# Patient Record
Sex: MALE | Race: WHITE | NOT HISPANIC OR LATINO | Employment: FULL TIME | URBAN - METROPOLITAN AREA
[De-identification: names, ages, dates, MRNs, and addresses within clinical notes are randomized per-mention and may not be internally consistent; named-entity substitution may affect disease eponyms.]

---

## 2017-02-22 ENCOUNTER — HOSPITAL ENCOUNTER (INPATIENT)
Facility: HOSPITAL | Age: 37
LOS: 14 days | Discharge: NURSING, REHAB OR LONG-TERM CARE | DRG: 915 | End: 2017-03-08
Attending: EMERGENCY MEDICINE | Admitting: INTERNAL MEDICINE
Payer: COMMERCIAL

## 2017-02-22 DIAGNOSIS — R00.0 TACHYCARDIA: ICD-10-CM

## 2017-02-22 DIAGNOSIS — R73.9 HYPERGLYCEMIA, DRUG-INDUCED: ICD-10-CM

## 2017-02-22 DIAGNOSIS — J18.9 PNEUMONIA OF BOTH LOWER LOBES DUE TO INFECTIOUS ORGANISM: ICD-10-CM

## 2017-02-22 DIAGNOSIS — T50.905A HYPERGLYCEMIA, DRUG-INDUCED: ICD-10-CM

## 2017-02-22 DIAGNOSIS — L51.1 STEVENS-JOHNSON SYNDROME: ICD-10-CM

## 2017-02-22 DIAGNOSIS — J15.69 PNEUMONIA DUE TO GRAM-NEGATIVE BACTERIA: ICD-10-CM

## 2017-02-22 DIAGNOSIS — Z01.818 ENCOUNTER FOR INTUBATION: ICD-10-CM

## 2017-02-22 DIAGNOSIS — S90.829A BLISTER OF FOOT, UNSPECIFIED LATERALITY, INITIAL ENCOUNTER: ICD-10-CM

## 2017-02-22 DIAGNOSIS — T78.2XXA ANAPHYLAXIS: ICD-10-CM

## 2017-02-22 DIAGNOSIS — J96.01 ACUTE RESPIRATORY FAILURE WITH HYPOXIA: ICD-10-CM

## 2017-02-22 DIAGNOSIS — L02.411 ABSCESS OF AXILLA, RIGHT: ICD-10-CM

## 2017-02-22 DIAGNOSIS — T78.2XXA ANAPHYLAXIS, INITIAL ENCOUNTER: ICD-10-CM

## 2017-02-22 DIAGNOSIS — R29.898 RIGHT ARM WEAKNESS: ICD-10-CM

## 2017-02-22 DIAGNOSIS — R78.81 POSITIVE BLOOD CULTURE: ICD-10-CM

## 2017-02-22 DIAGNOSIS — T78.40XA ALLERGIC REACTION, INITIAL ENCOUNTER: Primary | ICD-10-CM

## 2017-02-22 LAB
ALBUMIN SERPL BCP-MCNC: 4 G/DL
ALLENS TEST: ABNORMAL
ALP SERPL-CCNC: 53 U/L
ALT SERPL W/O P-5'-P-CCNC: 53 U/L
ANION GAP SERPL CALC-SCNC: 11 MMOL/L
APTT BLDCRRT: 26.4 SEC
AST SERPL-CCNC: 21 U/L
BACTERIA #/AREA URNS AUTO: ABNORMAL /HPF
BASOPHILS # BLD AUTO: 0.02 K/UL
BASOPHILS NFR BLD: 0.2 %
BILIRUB SERPL-MCNC: 1.1 MG/DL
BILIRUB UR QL STRIP: NEGATIVE
BNP SERPL-MCNC: <10 PG/ML
BUN SERPL-MCNC: 13 MG/DL
CALCIUM SERPL-MCNC: 9.5 MG/DL
CHLORIDE SERPL-SCNC: 106 MMOL/L
CLARITY UR REFRACT.AUTO: CLEAR
CO2 SERPL-SCNC: 22 MMOL/L
COLOR UR AUTO: YELLOW
CREAT SERPL-MCNC: 1.2 MG/DL
DELSYS: ABNORMAL
DIFFERENTIAL METHOD: ABNORMAL
EOSINOPHIL # BLD AUTO: 0.2 K/UL
EOSINOPHIL NFR BLD: 2 %
ERYTHROCYTE [DISTWIDTH] IN BLOOD BY AUTOMATED COUNT: 13.6 %
ERYTHROCYTE [SEDIMENTATION RATE] IN BLOOD BY WESTERGREN METHOD: 14 MM/H
EST. GFR  (AFRICAN AMERICAN): >60 ML/MIN/1.73 M^2
EST. GFR  (NON AFRICAN AMERICAN): >60 ML/MIN/1.73 M^2
FIO2: 80
FLUAV AG SPEC QL IA: NEGATIVE
FLUBV AG SPEC QL IA: NEGATIVE
GLUCOSE SERPL-MCNC: 103 MG/DL
GLUCOSE UR QL STRIP: NEGATIVE
HCO3 UR-SCNC: 21.7 MMOL/L (ref 24–28)
HCT VFR BLD AUTO: 47.3 %
HGB BLD-MCNC: 15.8 G/DL
HGB UR QL STRIP: ABNORMAL
HYALINE CASTS UR QL AUTO: 5 /LPF
INR PPP: 1.1
INR PPP: 1.1
KETONES UR QL STRIP: NEGATIVE
LACTATE SERPL-SCNC: 0.7 MMOL/L
LACTATE SERPL-SCNC: 1.4 MMOL/L
LACTATE SERPL-SCNC: 1.7 MMOL/L
LEUKOCYTE ESTERASE UR QL STRIP: NEGATIVE
LYMPHOCYTES # BLD AUTO: 1.5 K/UL
LYMPHOCYTES NFR BLD: 14.6 %
MCH RBC QN AUTO: 30.5 PG
MCHC RBC AUTO-ENTMCNC: 33.4 %
MCV RBC AUTO: 91 FL
MICROSCOPIC COMMENT: ABNORMAL
MODE: ABNORMAL
MONOCYTES # BLD AUTO: 0.7 K/UL
MONOCYTES NFR BLD: 6.2 %
NEUTROPHILS # BLD AUTO: 8.1 K/UL
NEUTROPHILS NFR BLD: 76.7 %
NITRITE UR QL STRIP: NEGATIVE
PCO2 BLDA: 41.5 MMHG (ref 35–45)
PEEP: 5
PH SMN: 7.33 [PH] (ref 7.35–7.45)
PH UR STRIP: 6 [PH] (ref 5–8)
PLATELET # BLD AUTO: 223 K/UL
PMV BLD AUTO: 10 FL
PO2 BLDA: 115 MMHG (ref 80–100)
POC BE: -4 MMOL/L
POC SATURATED O2: 98 % (ref 95–100)
POCT GLUCOSE: 152 MG/DL (ref 70–110)
POTASSIUM SERPL-SCNC: 4.1 MMOL/L
PROT SERPL-MCNC: 7.8 G/DL
PROT UR QL STRIP: ABNORMAL
PROTHROMBIN TIME: 11.3 SEC
PROTHROMBIN TIME: 11.3 SEC
RBC # BLD AUTO: 5.18 M/UL
RBC #/AREA URNS AUTO: 2 /HPF (ref 0–4)
SAMPLE: ABNORMAL
SITE: ABNORMAL
SODIUM SERPL-SCNC: 139 MMOL/L
SP GR UR STRIP: >=1.03 (ref 1–1.03)
SPECIMEN SOURCE: NORMAL
SQUAMOUS #/AREA URNS AUTO: 1 /HPF
TROPONIN I SERPL DL<=0.01 NG/ML-MCNC: <0.006 NG/ML
TSH SERPL DL<=0.005 MIU/L-ACNC: 2.23 UIU/ML
URN SPEC COLLECT METH UR: ABNORMAL
UROBILINOGEN UR STRIP-ACNC: NEGATIVE EU/DL
VT: 500
WBC # BLD AUTO: 10.53 K/UL
WBC #/AREA URNS AUTO: 2 /HPF (ref 0–5)

## 2017-02-22 PROCEDURE — 85730 THROMBOPLASTIN TIME PARTIAL: CPT

## 2017-02-22 PROCEDURE — 63600175 PHARM REV CODE 636 W HCPCS: Performed by: INTERNAL MEDICINE

## 2017-02-22 PROCEDURE — 94002 VENT MGMT INPAT INIT DAY: CPT

## 2017-02-22 PROCEDURE — 96361 HYDRATE IV INFUSION ADD-ON: CPT | Mod: 59

## 2017-02-22 PROCEDURE — 51702 INSERT TEMP BLADDER CATH: CPT

## 2017-02-22 PROCEDURE — 25000003 PHARM REV CODE 250: Performed by: EMERGENCY MEDICINE

## 2017-02-22 PROCEDURE — 63600175 PHARM REV CODE 636 W HCPCS: Performed by: EMERGENCY MEDICINE

## 2017-02-22 PROCEDURE — 84484 ASSAY OF TROPONIN QUANT: CPT

## 2017-02-22 PROCEDURE — 31500 INSERT EMERGENCY AIRWAY: CPT

## 2017-02-22 PROCEDURE — 99900035 HC TECH TIME PER 15 MIN (STAT)

## 2017-02-22 PROCEDURE — 80053 COMPREHEN METABOLIC PANEL: CPT

## 2017-02-22 PROCEDURE — 36600 WITHDRAWAL OF ARTERIAL BLOOD: CPT

## 2017-02-22 PROCEDURE — 99291 CRITICAL CARE FIRST HOUR: CPT | Mod: ,,, | Performed by: INTERNAL MEDICINE

## 2017-02-22 PROCEDURE — 25000003 PHARM REV CODE 250: Performed by: INTERNAL MEDICINE

## 2017-02-22 PROCEDURE — 87040 BLOOD CULTURE FOR BACTERIA: CPT | Mod: 59

## 2017-02-22 PROCEDURE — 85025 COMPLETE CBC W/AUTO DIFF WBC: CPT

## 2017-02-22 PROCEDURE — 96366 THER/PROPH/DIAG IV INF ADDON: CPT | Mod: 59

## 2017-02-22 PROCEDURE — 83605 ASSAY OF LACTIC ACID: CPT | Mod: 91

## 2017-02-22 PROCEDURE — 96367 TX/PROPH/DG ADDL SEQ IV INF: CPT | Mod: 59

## 2017-02-22 PROCEDURE — 27200966 HC CLOSED SUCTION SYSTEM

## 2017-02-22 PROCEDURE — 96365 THER/PROPH/DIAG IV INF INIT: CPT | Mod: 59

## 2017-02-22 PROCEDURE — 87400 INFLUENZA A/B EACH AG IA: CPT | Mod: 59

## 2017-02-22 PROCEDURE — 81000 URINALYSIS NONAUTO W/SCOPE: CPT

## 2017-02-22 PROCEDURE — 27100108

## 2017-02-22 PROCEDURE — 85610 PROTHROMBIN TIME: CPT

## 2017-02-22 PROCEDURE — 83880 ASSAY OF NATRIURETIC PEPTIDE: CPT

## 2017-02-22 PROCEDURE — 96376 TX/PRO/DX INJ SAME DRUG ADON: CPT

## 2017-02-22 PROCEDURE — 99285 EMERGENCY DEPT VISIT HI MDM: CPT

## 2017-02-22 PROCEDURE — 96375 TX/PRO/DX INJ NEW DRUG ADDON: CPT

## 2017-02-22 PROCEDURE — 27100080 HC AIRWAY ADAPTER-END TIDAL CO2

## 2017-02-22 PROCEDURE — 25000003 PHARM REV CODE 250

## 2017-02-22 PROCEDURE — 96368 THER/DIAG CONCURRENT INF: CPT | Mod: 59

## 2017-02-22 PROCEDURE — 82962 GLUCOSE BLOOD TEST: CPT

## 2017-02-22 PROCEDURE — 11000001 HC ACUTE MED/SURG PRIVATE ROOM

## 2017-02-22 PROCEDURE — 85347 COAGULATION TIME ACTIVATED: CPT

## 2017-02-22 PROCEDURE — 84443 ASSAY THYROID STIM HORMONE: CPT

## 2017-02-22 RX ORDER — LORAZEPAM 2 MG/ML
2 INJECTION INTRAMUSCULAR
Status: COMPLETED | OUTPATIENT
Start: 2017-02-22 | End: 2017-02-22

## 2017-02-22 RX ORDER — DIPHENHYDRAMINE HYDROCHLORIDE 50 MG/ML
25 INJECTION INTRAMUSCULAR; INTRAVENOUS
Status: DISCONTINUED | OUTPATIENT
Start: 2017-02-22 | End: 2017-02-22

## 2017-02-22 RX ORDER — ENOXAPARIN SODIUM 100 MG/ML
40 INJECTION SUBCUTANEOUS EVERY 24 HOURS
Status: DISCONTINUED | OUTPATIENT
Start: 2017-02-23 | End: 2017-03-08 | Stop reason: HOSPADM

## 2017-02-22 RX ORDER — SUCCINYLCHOLINE CHLORIDE 20 MG/ML
200 INJECTION INTRAMUSCULAR; INTRAVENOUS
Status: COMPLETED | OUTPATIENT
Start: 2017-02-22 | End: 2017-02-22

## 2017-02-22 RX ORDER — FAMOTIDINE 20 MG/50ML
20 INJECTION, SOLUTION INTRAVENOUS 2 TIMES DAILY
Status: DISCONTINUED | OUTPATIENT
Start: 2017-02-22 | End: 2017-03-02

## 2017-02-22 RX ORDER — PROPOFOL 10 MG/ML
INJECTION, EMULSION INTRAVENOUS
Status: DISPENSED
Start: 2017-02-22 | End: 2017-02-22

## 2017-02-22 RX ORDER — PROPOFOL 10 MG/ML
15 INJECTION, EMULSION INTRAVENOUS CONTINUOUS
Status: DISCONTINUED | OUTPATIENT
Start: 2017-02-22 | End: 2017-02-22

## 2017-02-22 RX ORDER — PROPOFOL 10 MG/ML
INJECTION, EMULSION INTRAVENOUS
Status: DISPENSED
Start: 2017-02-22 | End: 2017-02-23

## 2017-02-22 RX ORDER — FAMOTIDINE 20 MG/50ML
20 INJECTION, SOLUTION INTRAVENOUS
Status: COMPLETED | OUTPATIENT
Start: 2017-02-22 | End: 2017-02-22

## 2017-02-22 RX ORDER — LORAZEPAM 2 MG/ML
INJECTION INTRAMUSCULAR
Status: DISPENSED
Start: 2017-02-22 | End: 2017-02-22

## 2017-02-22 RX ORDER — IPRATROPIUM BROMIDE AND ALBUTEROL SULFATE 2.5; .5 MG/3ML; MG/3ML
3 SOLUTION RESPIRATORY (INHALATION) EVERY 6 HOURS
Status: DISCONTINUED | OUTPATIENT
Start: 2017-02-23 | End: 2017-03-03

## 2017-02-22 RX ORDER — DIPHENHYDRAMINE HYDROCHLORIDE 50 MG/ML
50 INJECTION INTRAMUSCULAR; INTRAVENOUS
Status: COMPLETED | OUTPATIENT
Start: 2017-02-22 | End: 2017-02-22

## 2017-02-22 RX ORDER — FENTANYL CITRAT/DEXTROSE 5%/PF 100 MCG/10
PATIENT CONTROLLED ANALGESIA SYRINGE INTRAVENOUS
Status: COMPLETED | OUTPATIENT
Start: 2017-02-22 | End: 2017-02-22

## 2017-02-22 RX ORDER — PROPOFOL 10 MG/ML
15 INJECTION, EMULSION INTRAVENOUS
Status: COMPLETED | OUTPATIENT
Start: 2017-02-22 | End: 2017-02-22

## 2017-02-22 RX ORDER — PROPOFOL 10 MG/ML
15 INJECTION, EMULSION INTRAVENOUS CONTINUOUS
Status: DISCONTINUED | OUTPATIENT
Start: 2017-02-22 | End: 2017-02-23

## 2017-02-22 RX ORDER — SODIUM CHLORIDE 9 MG/ML
INJECTION, SOLUTION INTRAVENOUS CONTINUOUS
Status: DISCONTINUED | OUTPATIENT
Start: 2017-02-22 | End: 2017-02-23

## 2017-02-22 RX ORDER — ETOMIDATE 2 MG/ML
30 INJECTION INTRAVENOUS
Status: COMPLETED | OUTPATIENT
Start: 2017-02-22 | End: 2017-02-22

## 2017-02-22 RX ORDER — ONDANSETRON 2 MG/ML
4 INJECTION INTRAMUSCULAR; INTRAVENOUS EVERY 12 HOURS PRN
Status: DISCONTINUED | OUTPATIENT
Start: 2017-02-22 | End: 2017-03-08 | Stop reason: HOSPADM

## 2017-02-22 RX ADMIN — SUCCINYLCHOLINE CHLORIDE 170 MG: 20 INJECTION, SOLUTION INTRAMUSCULAR; INTRAVENOUS at 08:02

## 2017-02-22 RX ADMIN — PIPERACILLIN AND TAZOBACTAM 4.5 G: 4; .5 INJECTION, POWDER, LYOPHILIZED, FOR SOLUTION INTRAVENOUS; PARENTERAL at 10:02

## 2017-02-22 RX ADMIN — PROPOFOL 15 MCG/KG/MIN: 10 INJECTION, EMULSION INTRAVENOUS at 08:02

## 2017-02-22 RX ADMIN — PROPOFOL 40 MCG/KG/MIN: 10 INJECTION, EMULSION INTRAVENOUS at 02:02

## 2017-02-22 RX ADMIN — FAMOTIDINE 20 MG: 20 INJECTION, SOLUTION INTRAVENOUS at 11:02

## 2017-02-22 RX ADMIN — ETOMIDATE 30 MG: 2 INJECTION, SOLUTION INTRAVENOUS at 08:02

## 2017-02-22 RX ADMIN — FAMOTIDINE 20 MG: 20 INJECTION, SOLUTION INTRAVENOUS at 08:02

## 2017-02-22 RX ADMIN — LORAZEPAM 2 MG: 2 INJECTION, SOLUTION INTRAMUSCULAR; INTRAVENOUS at 08:02

## 2017-02-22 RX ADMIN — METHYLPREDNISOLONE SODIUM SUCCINATE 125 MG: 125 INJECTION, POWDER, FOR SOLUTION INTRAMUSCULAR; INTRAVENOUS at 08:02

## 2017-02-22 RX ADMIN — DIPHENHYDRAMINE HYDROCHLORIDE 50 MG: 50 INJECTION, SOLUTION INTRAMUSCULAR; INTRAVENOUS at 08:02

## 2017-02-22 RX ADMIN — LORAZEPAM 2 MG: 2 INJECTION, SOLUTION INTRAMUSCULAR; INTRAVENOUS at 10:02

## 2017-02-22 RX ADMIN — SODIUM CHLORIDE 1000 ML: 0.9 INJECTION, SOLUTION INTRAVENOUS at 08:02

## 2017-02-22 RX ADMIN — PROPOFOL 40 MCG/KG/MIN: 10 INJECTION, EMULSION INTRAVENOUS at 07:02

## 2017-02-22 RX ADMIN — PROPOFOL 40 MCG/KG/MIN: 10 INJECTION, EMULSION INTRAVENOUS at 10:02

## 2017-02-22 RX ADMIN — Medication 2500 MCG: at 10:02

## 2017-02-22 RX ADMIN — VANCOMYCIN HYDROCHLORIDE 2000 MG: 1 INJECTION, POWDER, LYOPHILIZED, FOR SOLUTION INTRAVENOUS at 01:02

## 2017-02-22 RX ADMIN — SODIUM CHLORIDE: 0.9 INJECTION, SOLUTION INTRAVENOUS at 03:02

## 2017-02-22 RX ADMIN — PROPOFOL 40 MCG/KG/MIN: 10 INJECTION, EMULSION INTRAVENOUS at 04:02

## 2017-02-22 NOTE — SUBJECTIVE & OBJECTIVE
Past Medical History   Diagnosis Date    Gout        History reviewed. No pertinent past surgical history.    Review of patient's allergies indicates:   Allergen Reactions    Bactrim [sulfamethoxazole-trimethoprim] Swelling    Rifamycin analogues        No current facility-administered medications on file prior to encounter.      Current Outpatient Prescriptions on File Prior to Encounter   Medication Sig    [DISCONTINUED] sulfamethoxazole-trimethoprim 800-160mg (BACTRIM DS) 800-160 mg Tab Take 2 tablets by mouth 2 (two) times daily.     Family History     Problem Relation (Age of Onset)    Heart disease Father    No Known Problems Mother        Social History Main Topics    Smoking status: Former Smoker     Types: Cigarettes     Quit date: 2/21/2013    Smokeless tobacco: Not on file    Alcohol use No    Drug use: No    Sexual activity: Not on file     Review of Systems   Unable to perform ROS: Intubated     Objective:     Vital Signs (Most Recent):  Temp: 98.7 °F (37.1 °C) (02/22/17 1325)  Pulse: 104 (02/22/17 1419)  Resp: (!) 30 (02/22/17 1419)  BP: 128/75 (02/22/17 1419)  SpO2: 95 % (02/22/17 1419) Vital Signs (24h Range):  Temp:  [98.7 °F (37.1 °C)-99 °F (37.2 °C)] 98.7 °F (37.1 °C)  Pulse:  [] 104  Resp:  [16-30] 30  SpO2:  [87 %-100 %] 95 %  BP: (110-172)/() 128/75     Weight: (!) 176.2 kg (388 lb 7.2 oz)  Body mass index is 51.25 kg/(m^2).    Physical Exam   Constitutional: He appears well-developed and well-nourished.   HENT:   Head: Normocephalic and atraumatic.   Nose: Nose normal.   No sloughing noted in mouth in the areas visible   Eyes: Conjunctivae and EOM are normal.   Cardiovascular: Normal rate, regular rhythm, normal heart sounds and intact distal pulses.    Pulmonary/Chest: Effort normal and breath sounds normal.   Patient intubated and sedate   Abdominal: Soft. Bowel sounds are normal.   Genitourinary: Penis normal.   Neurological:   sedated   Skin: Skin is warm and dry.    Multiple tatoos noted  Left leg birth ceci noted.        Significant Labs:   Blood Culture: No results for input(s): LABBLOO in the last 48 hours.  CBC:   Recent Labs  Lab 02/22/17  0811   WBC 10.53   HGB 15.8   HCT 47.3        CMP:   Recent Labs  Lab 02/22/17  0811      K 4.1      CO2 22*      BUN 13   CREATININE 1.2   CALCIUM 9.5   PROT 7.8   ALBUMIN 4.0   BILITOT 1.1*   ALKPHOS 53*   AST 21   ALT 53*   ANIONGAP 11   EGFRNONAA >60.0     Coagulation:   Recent Labs  Lab 02/22/17  0811 02/22/17  1025   INR 1.1 1.1   APTT 26.4  --      Lactic Acid:   Recent Labs  Lab 02/22/17  1025 02/22/17  1310   LACTATE 0.7 1.4     Troponin:   Recent Labs  Lab 02/22/17  1025   TROPONINI <0.006     Urine Studies:   Recent Labs  Lab 02/22/17  1025   COLORU Yellow   APPEARANCEUA Clear   PHUR 6.0   SPECGRAV >=1.030*   PROTEINUA 2+*   GLUCUA Negative   KETONESU Negative   BILIRUBINUA Negative   OCCULTUA Trace*   NITRITE Negative   UROBILINOGEN Negative   LEUKOCYTESUR Negative   RBCUA 2   WBCUA 2   BACTERIA Rare   SQUAMEPITHEL 1   HYALINECASTS 5*       Significant Imaging:   CXR:   1.  An endotracheal tube is in place.  The tip is located 38 mm superior to the estefany.   2.  The borders of the heart are not well seen.  There is a moderate amount of alveolar consolidation in the left perihilar region.  This may be secondary to atelectasis.  However, an infectious process cannot be excluded.  3.  There is opacification of the left costophrenic angle.  This is characteristic of a tiny left pleural effusion.

## 2017-02-22 NOTE — ED NOTES
46.7 ML Fentanyl infused. Remaining bag sent with [facility]. Fentanyl, Propofol, Zosyn infusing en route.

## 2017-02-22 NOTE — ASSESSMENT & PLAN NOTE
- recent use of bactrim  - currently intubated  - monitor vitals  - Leukocytosis likely reactive

## 2017-02-22 NOTE — ASSESSMENT & PLAN NOTE
- I&D done on 2/21/17 at [facility]  - DC bactrim due to reaction  - Cont IV vanc - monitor renal function  - Area clean and dry

## 2017-02-22 NOTE — PROGRESS NOTES
[facility] Hospital Medicine  Progress Note    Patient Name: Matty Nunn  MRN: [id number]  Patient Class: IP- Inpatient   Admission Date: 2/22/2017  Length of Stay: 0 days  Attending Physician: Madi Thurman MD  Primary Care Provider: Ruddy Noble MD        Subjective:     Principal Problem:Allergic reaction    HPI:  Patient is a 37 yo male with a hx of gout presented to [facility] on 2/21/17 with a right axillary abscess which was I&D'd in the ER.  Patient was sent home on Bactrim.  He return to the ER today for increase difficulty breathing.  He was intubated in the ER, but self extubated himself.  He was then re intubated and sent to [facility] for further ICU care.      All history taken from records as pt has been intubated and sedated since prior to arrival.  Case d/w Dr. Mckinney ([facility] ER attending) who states all hx was taken from the pt prior to intubation.  Patient has a notable breath ceci on his left leg.      Hospital Course:       Past Medical History   Diagnosis Date    Gout        History reviewed. No pertinent past surgical history.    Review of patient's allergies indicates:   Allergen Reactions    Bactrim [sulfamethoxazole-trimethoprim] Swelling    Rifamycin analogues        No current facility-administered medications on file prior to encounter.      Current Outpatient Prescriptions on File Prior to Encounter   Medication Sig    [DISCONTINUED] sulfamethoxazole-trimethoprim 800-160mg (BACTRIM DS) 800-160 mg Tab Take 2 tablets by mouth 2 (two) times daily.     Family History     Problem Relation (Age of Onset)    Heart disease Father    No Known Problems Mother        Social History Main Topics    Smoking status: Former Smoker     Types: Cigarettes     Quit date: 2/21/2013    Smokeless tobacco: Not on file    Alcohol use No    Drug use: No    Sexual activity: Not on file     Review of Systems   Unable to perform ROS: Intubated     Objective:     Vital  Signs (Most Recent):  Temp: 98.7 °F (37.1 °C) (02/22/17 1325)  Pulse: 104 (02/22/17 1419)  Resp: (!) 30 (02/22/17 1419)  BP: 128/75 (02/22/17 1419)  SpO2: 95 % (02/22/17 1419) Vital Signs (24h Range):  Temp:  [98.7 °F (37.1 °C)-99 °F (37.2 °C)] 98.7 °F (37.1 °C)  Pulse:  [] 104  Resp:  [16-30] 30  SpO2:  [87 %-100 %] 95 %  BP: (110-172)/() 128/75     Weight: (!) 176.2 kg (388 lb 7.2 oz)  Body mass index is 51.25 kg/(m^2).    Physical Exam   Constitutional: He appears well-developed and well-nourished.   HENT:   Head: Normocephalic and atraumatic.   Nose: Nose normal.   No sloughing noted in mouth in the areas visible   Eyes: Conjunctivae and EOM are normal.   Cardiovascular: Normal rate, regular rhythm, normal heart sounds and intact distal pulses.    Pulmonary/Chest: Effort normal and breath sounds normal.   Patient intubated and sedate   Abdominal: Soft. Bowel sounds are normal.   Genitourinary: Penis normal.   Neurological:   sedated   Skin: Skin is warm and dry.   Multiple tatoos noted  Left leg birth ceci noted.        Significant Labs:   Blood Culture: No results for input(s): LABBLOO in the last 48 hours.  CBC:   Recent Labs  Lab 02/22/17  0811   WBC 10.53   HGB 15.8   HCT 47.3        CMP:   Recent Labs  Lab 02/22/17  0811      K 4.1      CO2 22*      BUN 13   CREATININE 1.2   CALCIUM 9.5   PROT 7.8   ALBUMIN 4.0   BILITOT 1.1*   ALKPHOS 53*   AST 21   ALT 53*   ANIONGAP 11   EGFRNONAA >60.0     Coagulation:   Recent Labs  Lab 02/22/17  0811 02/22/17  1025   INR 1.1 1.1   APTT 26.4  --      Lactic Acid:   Recent Labs  Lab 02/22/17  1025 02/22/17  1310   LACTATE 0.7 1.4     Troponin:   Recent Labs  Lab 02/22/17  1025   TROPONINI <0.006     Urine Studies:   Recent Labs  Lab 02/22/17  1025   COLORU Yellow   APPEARANCEUA Clear   PHUR 6.0   SPECGRAV >=1.030*   PROTEINUA 2+*   GLUCUA Negative   KETONESU Negative   BILIRUBINUA Negative   OCCULTUA Trace*   NITRITE Negative    UROBILINOGEN Negative   LEUKOCYTESUR Negative   RBCUA 2   WBCUA 2   BACTERIA Rare   SQUAMEPITHEL 1   HYALINECASTS 5*       Significant Imaging:   CXR:   1.  An endotracheal tube is in place.  The tip is located 38 mm superior to the estefany.   2.  The borders of the heart are not well seen.  There is a moderate amount of alveolar consolidation in the left perihilar region.  This may be secondary to atelectasis.  However, an infectious process cannot be excluded.  3.  There is opacification of the left costophrenic angle.  This is characteristic of a tiny left pleural effusion.    Assessment/Plan:      * Allergic reaction  - recent use of bactrim  - currently intubated  - monitor vitals        Abscess of axilla, right  - I&D done on 2/21/17 at [facility]  - DC bactrim due to reaction  - Cont IV vanc - monitor renal function      VTE Risk Mitigation     None          Martin Grajeda MD  Department of Hospital Medicine   [facility]

## 2017-02-22 NOTE — IP AVS SNAPSHOT
[facility]  [id number] [place] [address] [address] [place] [address]           Patient Discharge Instructions     Our goal is to set you up for success. This packet includes information on your condition, medications, and your home care. It will help you to care for yourself so you don't get sicker and need to go back to the hospital.     Please ask your nurse if you have any questions.        There are many details to remember when preparing to leave the hospital. Here is what you will need to do:    1. Take your medicine. If you are prescribed medications, review your Medication List in the following pages. You may have new medications to  at the pharmacy and others that you'll need to stop taking. Review the instructions for how and when to take your medications. Talk with your doctor or nurses if you are unsure of what to do.     2. Go to your follow-up appointments. Specific follow-up information is listed in the following pages. Your may be contacted by a transition nurse or clinical provider about future appointments. Be sure we have all of the phone numbers to reach you, if needed. Please contact your provider's office if you are unable to make an appointment.     3. Watch for warning signs. Your doctor or nurse will give you detailed warning signs to watch for and when to call for assistance. These instructions may also include educational information about your condition. If you experience any of warning signs to your health, call your doctor.               ** Verify the list of medication(s) below is accurate and up to date. Carry this with you in case of emergency. If your medications have changed, please notify your healthcare provider.             Medication List      ASK your doctor about these medications        Additional Info                      sulfamethoxazole-trimethoprim 800-160mg 800-160 mg Tab   Commonly known as:  BACTRIM DS   Quantity:  28 tablet   Refills:  0   Dose:  2  tablet    Instructions:  Take 2 tablets by mouth 2 (two) times daily.     Begin Date    AM    Noon    PM    Bedtime                  Please bring to all follow up appointments:    1. A copy of your discharge instructions.  2. All medicines you are currently taking in their original bottles.  3. Identification and insurance card.    Please arrive 15 minutes ahead of scheduled appointment time.    Please call 24 hours in advance if you must reschedule your appointment and/or time.        Follow-up Information     Follow up with Ruddy Noble MD In 3 weeks.    Specialty:  Family Medicine    Why:  Hospital follow up.    Contact information:    [address] [address]  [place] [address]  [phone]          Follow up with [facility].    Why:  Rehab    Contact information:    [phone]      Referrals     Future Orders    Referral to OutPatient  Physical therapy     Questions:    Post Surgical?:  No    Eval and Treat:  Yes    Duration:  60 days    Frequency (times per week):  Three    Precautions:      Location:      OT Location:      Restore Functional ADL Training?:      Gait Training:  Yes    Therapeutic Exercises:      Wound Care:      Traction:      Electric Stimulation:      IONTO.:      U/S:      Developmental Stimulation?:      Specialty Programs:      Referral to Outpatient Occupational therapy     Questions:    Post Surgical?:  No    OT Location:      Restore Functional ADL Training?:      Therapeutic Exercises:      Electric Stimulation:      IONTO.:      U/S:      Developmental Stimulation?:      Splinting (Specific Area):      Specialty Program:          Discharge Instructions     Future Orders    Diet general     Questions:    Total calories:      Fat restriction, if any:      Protein restriction, if any:      Na restriction, if any:      Fluid restriction:      Additional restrictions:      WALKER FOR HOME USE     Questions:    Type of Walker:  Heavy  "duty Comment - Bariatric     With wheels?:  Yes    Height:  6' 1" (1.854 m)    Weight:  152.9 kg (337 lb 1.3 oz)    Length of need (1-99 months):  12    Platform attachment:      Accessories/Other:      Assistance needed:      Does patient have medical equipment at home?:  none    Please check all that apply:  Patient's condition impairs ambulation.    Patient is unable to safely ambulate without equipment.    Patient needs help to get in and out of chair.    Walker will be used for gait training.        Primary Diagnosis     Your primary diagnosis was:  Severe Allergic Reaction      Admission Information     Date & Time Provider Department CSN    2/22/2017  8:04 AM Victorino Yu MD [facility] [id number]      Care Providers     Provider Role Specialty Primary office phone    Victorino Yu MD Attending Provider Internal Medicine [phone]    Gail Paz DPM Consulting Physician  Podiatry [phone]    Victorino Yu MD Team Attending  Internal Medicine [phone]      Your Vitals Were     BP Pulse Temp Resp Height Weight    121/59 92 98.5 °F (36.9 °C) (Oral) 18 6' 1" (1.854 m) 156 kg (343 lb 14.7 oz)    SpO2 BMI             94% 45.37 kg/m2         Recent Lab Values     No lab values to display.      Pending Labs     Order Current Status    Aerobic culture In process      Allergies as of 3/8/2017        Reactions    Bactrim [Sulfamethoxazole-trimethoprim] Swelling    Rifamycin Analogues       [facility]     [facility] Nurse Care Line - 24/7 Assistance  Unless otherwise directed by your provider, please contact [facility], our nurse care line that is available for 24/7 assistance.     Registered nurses in the [facility] provide clinical advisement, health education, appointment booking, and other advisory services.  Call for this free service at [phone].        Advance Directives     An advance directive is a document which, in the event " you are no longer able to make decisions for yourself, tells your healthcare team what kind of treatment you do or do not want to receive, or who you would like to make those decisions for you.  If you do not currently have an advance directive, [facility] encourages you to create one.  For more information call:  [phone]), [phone] ([phone]),  or log on to [email or web address].        Smoking Cessation     If you would like to quit smoking:   You may be eligible for free services if you are a [place] resident and started smoking cigarettes before September 1, 1988.  Call the Smoking Cessation Trust (SCT) toll free at (130) 265-1195 or (706) 090-3339.   Call 2-327-QUIT-NOW if you do not meet the above criteria.            Language Assistance Services     ATTENTION: Language assistance services are available, free of charge. Please call [phone].      ATENCIÓN: Si habla español, tiene a burgos disposición servicios gratuitos de asistencia lingüística. Llame al [phone].     [facility] Ý: N?u b?n nói Ti?ng Vi?t, có các d?ch v? h? tr? ngôn ng? mi?n phí dành cho b?n. G?i s? [phone].        Pneumonmia Discharge Instructions                [facility] Sign-Up     Activating your [facility] account is as easy as 1-2-3!     1) Visit [email or web address], select Sign Up Now, enter this activation code and your date of birth, then select Next.  [email or web address]  Expires: 4/7/2017  6:10 AM      2) Create a username and password to use when you visit [facility] in the future and select a security question in case you lose your password and select Next.    3) Enter your e-mail address and click Sign Up!    Additional Information  If you have questions, please e-mail [email or web address] or call [phone] to talk to our [facility] staff. Remember, [facility] is NOT to be used for urgent needs. For medical emergencies, dial 911.          [facility]  complies with applicable Federal  civil rights laws and does not discriminate on the basis of race, color, national origin, age, disability, or sex.

## 2017-02-22 NOTE — ED NOTES
Pt combative, pulling at ET tube. RAC IV pulled out with activity. RN x2, RT, Xray tech x2 at bedside.

## 2017-02-22 NOTE — ED NOTES
Pt's wife at bedside, pt stimulated, pt moving extremities, biting tube, increased respirations, breathing above vent.  Pt and pt's wife reassured.  Pt's eyes remained closed, increased sedation for comfort.

## 2017-02-22 NOTE — ED PROVIDER NOTES
Encounter Date: 2/22/2017       History     Chief Complaint   Patient presents with    Allergic Reaction     throat swelling, started bactrim yesterday, hx of allergy reports didn't know name of med yesterday when precribed     Review of patient's allergies indicates:   Allergen Reactions    Bactrim [sulfamethoxazole-trimethoprim] Swelling    Rifamycin analogues      Patient is a 36 y.o. male presenting with the following complaint: allergic reaction. The history is provided by the patient.   Allergic Reaction   The primary symptoms are  shortness of breath and cough. The primary symptoms do not include wheezing, abdominal pain, nausea, vomiting, diarrhea, dizziness, palpitations, altered mental status, rash or urticaria. The current episode started yesterday. The problem has been gradually worsening.   The shortness of breath began yesterday. The patient's medical history does not include CHF, COPD, asthma or chronic lung disease.       Past Medical History   Diagnosis Date    Gout      Past Medical History Pertinent Negatives   Diagnosis Date Noted    Immune deficiency disorder 2/22/2017     History reviewed. No pertinent past surgical history.  Family History   Problem Relation Age of Onset    No Known Problems Mother     Heart disease Father      Social History   Substance Use Topics    Smoking status: Former Smoker     Types: Cigarettes     Quit date: 2/21/2013    Smokeless tobacco: None    Alcohol use No     Review of Systems   Constitutional: Negative for chills and fever.   HENT: Positive for sore throat, trouble swallowing and voice change.    Respiratory: Positive for cough and shortness of breath. Negative for wheezing and stridor.    Cardiovascular: Negative for chest pain, palpitations and leg swelling.   Gastrointestinal: Negative for abdominal pain, diarrhea, nausea and vomiting.   Genitourinary: Negative for dysuria.   Musculoskeletal: Negative for back pain.   Skin: Positive for wound.  Negative for rash.   Neurological: Negative for dizziness and weakness.   Hematological: Does not bruise/bleed easily.   All other systems reviewed and are negative.      Physical Exam   Initial Vitals   BP Pulse Resp Temp SpO2   02/22/17 0807 02/22/17 0807 02/22/17 0807 02/22/17 0807 02/22/17 0807   135/78 117 18 99 °F (37.2 °C) 91 %     Physical Exam    Nursing note and vitals reviewed.  Constitutional: He appears well-developed and well-nourished. He appears distressed.   Walking speakin c muffeled voice.  Calm but uncomfortable   HENT:   Head: Normocephalic and atraumatic.   Mouth/Throat: Uvula is midline. Oral lesions present. No uvula swelling. Oropharyngeal exudate, posterior oropharyngeal edema and posterior oropharyngeal erythema present. No tonsillar abscesses.   Exudate and skin sloughing soft palet and post OP.  No stridor.  + gag and difficulty/pain c swollowing   Eyes: EOM are normal. Pupils are equal, round, and reactive to light.   Neck: Normal range of motion. Neck supple.   Cardiovascular: Normal rate, regular rhythm, normal heart sounds and intact distal pulses.   Pulmonary/Chest: No respiratory distress. He has no wheezes. He has no rhonchi.   Slight decreased BS on L- no wheezing   Abdominal: Soft. Bowel sounds are normal. There is no rebound.   Musculoskeletal: Normal range of motion. He exhibits no edema.   Neurological: He is alert and oriented to person, place, and time. He has normal strength. No cranial nerve deficit or sensory deficit.   Skin: Skin is warm and dry. No rash noted.   Birthmark on LLE   Psychiatric: He has a normal mood and affect. His behavior is normal. Judgment and thought content normal.         ED Course   Intubation  Date/Time: 2/22/2017 8:50 AM  Performed by: DARRIN KUHN  Authorized by: DARRIN KUHN.   Consent Done: Not Needed  Indications: respiratory distress and  airway protection  Intubation method: direct  Patient status: paralyzed (RSI)  Preoxygenation: mask and  nonrebreather mask  Sedatives: etomidate  Paralytic: succinylcholine  Laryngoscope size: Mac 4  Tube size: 8.0 mm  Tube type: cuffed  Number of attempts: 1  Cricoid pressure: yes  Cords visualized: yes  Post-procedure assessment: chest rise and CO2 detector  Breath sounds: clear  Cuff inflated: yes  ETT to lip: 24 cm  Tube secured with: umbilical tape  Patient tolerance: Patient tolerated the procedure well with no immediate complications    Intubation  Date/Time: 2/22/2017 9:00 AM  Performed by: DARRIN KUHN  Authorized by: DARRIN KUHN   Consent Done: Not Needed  Indications: airway protection  Intubation method: direct  Patient status: paralyzed (RSI)  Pretreatment meds: Propofol.  Sedatives: propofol  Paralytic: succinylcholine  Laryngoscope size: Mac 4  Tube size: 8.0 mm  Tube type: cuffed  Number of attempts: 1  Cricoid pressure: yes  Cords visualized: yes  Post-procedure assessment: chest rise and CO2 detector  Breath sounds: clear  Cuff inflated: yes  ETT to lip: 24 cm  Tube secured with: umbilical tape  Chest x-ray interpreted by me.  Chest x-ray findings: endotracheal tube too high  Tube repositioned: tube repositioned successfully  Patient tolerance: Patient tolerated the procedure well with no immediate complications        Labs Reviewed   CBC W/ AUTO DIFFERENTIAL   COMPREHENSIVE METABOLIC PANEL   APTT   PROTIME-INR   URINALYSIS   TSH         Results for orders placed or performed during the hospital encounter of 02/22/17   CBC auto differential   Result Value Ref Range    WBC 10.53 3.90 - 12.70 K/uL    RBC 5.18 4.60 - 6.20 M/uL    Hemoglobin 15.8 14.0 - 18.0 g/dL    Hematocrit 47.3 40.0 - 54.0 %    MCV 91 82 - 98 fL    MCH 30.5 27.0 - 31.0 pg    MCHC 33.4 32.0 - 36.0 %    RDW 13.6 11.5 - 14.5 %    Platelets 223 150 - 350 K/uL    MPV 10.0 9.2 - 12.9 fL    Gran # 8.1 (H) 1.8 - 7.7 K/uL    Lymph # 1.5 1.0 - 4.8 K/uL    Mono # 0.7 0.3 - 1.0 K/uL    Eos # 0.2 0.0 - 0.5 K/uL    Baso # 0.02 0.00 - 0.20 K/uL     Gran% 76.7 (H) 38.0 - 73.0 %    Lymph% 14.6 (L) 18.0 - 48.0 %    Mono% 6.2 4.0 - 15.0 %    Eosinophil% 2.0 0.0 - 8.0 %    Basophil% 0.2 0.0 - 1.9 %    Differential Method Automated    Comprehensive metabolic panel   Result Value Ref Range    Sodium 139 136 - 145 mmol/L    Potassium 4.1 3.5 - 5.1 mmol/L    Chloride 106 95 - 110 mmol/L    CO2 22 (L) 23 - 29 mmol/L    Glucose 103 70 - 110 mg/dL    BUN, Bld 13 6 - 20 mg/dL    Creatinine 1.2 0.5 - 1.4 mg/dL    Calcium 9.5 8.7 - 10.5 mg/dL    Total Protein 7.8 6.0 - 8.4 g/dL    Albumin 4.0 3.5 - 5.2 g/dL    Total Bilirubin 1.1 (H) 0.1 - 1.0 mg/dL    Alkaline Phosphatase 53 (L) 55 - 135 U/L    AST 21 10 - 40 U/L    ALT 53 (H) 10 - 44 U/L    Anion Gap 11 8 - 16 mmol/L    eGFR if African American >60.0 >60 mL/min/1.73 m^2    eGFR if non African American >60.0 >60 mL/min/1.73 m^2   APTT   Result Value Ref Range    aPTT 26.4 21.0 - 32.0 sec   Protime-INR   Result Value Ref Range    Prothrombin Time 11.3 9.0 - 12.5 sec    INR 1.1 0.8 - 1.2   TSH   Result Value Ref Range    TSH 2.225 0.400 - 4.000 uIU/mL   Protime-INR   Result Value Ref Range    Prothrombin Time 11.3 9.0 - 12.5 sec    INR 1.1 0.8 - 1.2               Imaging Results         X-Ray Chest 1 View (Final result) Result time:  02/22/17 09:57:42    Final result by PATRICIA Mcnulty Sr., MD (02/22/17 09:57:42)    Impression:        1.  An endotracheal tube is in place.  The tip is located 38 mm superior to the estefany.   2.  The borders of the heart are not well seen.  There is a moderate amount of alveolar consolidation in the left perihilar region.  This may be secondary to atelectasis.  However, an infectious process cannot be excluded.  3.  There is opacification of the left costophrenic angle.  This is characteristic of a tiny left pleural effusion.      Electronically signed by: PATRICIA MCNULTY MD  Date:     02/22/17  Time:    09:57     Narrative:    One-view chest x-ray    Clinical History: Encounter for other  preprocedural examination    Finding: An endotracheal tube is in place.  The tip is located 38 mm superior to the estefany.  The borders of the heart are not well seen.  There is a moderate amount of alveolar consolidation in the left perihilar region.  There is opacification of the left costophrenic angle.  The right costophrenic angle is sharp.  There is no pneumothorax.                            ED Course    [place] discussion with the patient.  He is maintaining his airway okay at this time.  He does have a gag reflex but has a muffled voice and difficulty swallowing.  On exam here he has some skin sloughing off his soft palate and his posterior oropharynx.  Discussed the differential diagnosis including Johnson-Hesham syndrome and the possibility that his condition could worsen.  He understands that he will need to be admitted to the hospital and is agreeable to being admitted to [facility] in [place].  However due to him having worsening symptoms and having some difficulty swallowing this time I recommend that I sedate him and and intubate him to protect his airway.  He understands this and the need to protect his airway in case things get worse.  We'll move him to a larger room to proceed with intubation and transfer him for admission to the hospital once his airway is secured.    9:08 AM patient extubated himself and tore out his IV.  We're working on IV access at this moment and we'll reintubate him as soon as possible.    10:32 AM Patient resting comfortably on the end but still moving intermittently.  We will add a fentanyl drip for his comfort - he is in soft restraints my order.  I admitted to the hospital for continued evaluation and treatment of his ALLERGIC reaction and in the intensive care unit because he is intubated and we cannot provide that level of care at this facility.  I discussed the case with Tess and [facility] medicine and she agrees to the current management.  She  requests I write admission orders to Dr. Thurman to the intensive care unit.  I do not see any other skin involvement and did not anticipate he will be need the burn unit.  It only seems to be involving his mucous membranes is posterior oropharynx.  The patient will need to be transported by [facility] ambulance service because he is intubated and receiving sedation IV.  There is no ICU bed available this time so Dr. Escobedo has accepted transfer the patient to the emergency department and Tess with [facility] medicine states they will evaluate him there.      2:07 PM I spoke with Dr. Grajeda who is taking care of the patient and she inquired about a rash since on the patient's lower extremity that looks like purpura and is blanchable.  I failed to document that in my initial chart that I asked the patient about that rash and he assured me he has had that since birth-it is a birthmark but I failed to document that on my initial physical exam note.     Clinical Impression:   The primary encounter diagnosis was Allergic reaction, initial encounter. 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    Disposition:   Disposition: Transferred  Condition: Stable       Everton Mckinney MD  02/23/17 0821

## 2017-02-22 NOTE — PROGRESS NOTES
Patient  placed on NRB. Intubation airways setup at bedside to protect airways from adverse allergic reactions. Ambu bag connected with 100% O2. Suction and MV set up and ready.

## 2017-02-22 NOTE — PROGRESS NOTES
Spoke with Dr. Mckinney in regards to patient. Pt will be transferred from [facility] ED to [facility] ED.  will not be accepting at this time. Once patient arrives to [facility] ED, pt will be re evaluated, if there is a high suspicion for Johnson Hesham, pt will need to be transferred to an appropriate facility.

## 2017-02-22 NOTE — PROGRESS NOTES
[facility] and connected to transport vent at this time.

## 2017-02-22 NOTE — H&P
[facility] Hospital Medicine  History and Physical     Patient Name: Matty Nunn  MRN: [id number]  Patient Class: IP- Inpatient   Admission Date: 2/22/2017  Length of Stay: 0 days  Attending Physician: Martin Grajeda MD  Primary Care Provider: Ruddy Noble MD           Subjective:      Principal Problem:Allergic reaction     HPI:  Patient is a 37 yo male with a hx of gout presented to [facility] on 2/21/17 with a right axillary abscess which was I&D'd in the ER. Patient was sent home on Bactrim. He return to the ER today for increase difficulty breathing. He was intubated in the ER, but self extubated himself. He was then re intubated and sent to [facility] for further ICU care.      All history taken from records as pt has been intubated and sedated since prior to arrival. Case d/w Dr. Mckinney ([facility] ER attending) who states all hx was taken from the pt prior to intubation. Patient has a notable breath ceci on his left leg.        Hospital Course:             Past Medical History   Diagnosis Date    Gout           History reviewed. No pertinent past surgical history.          Review of patient's allergies indicates:   Allergen Reactions    Bactrim [sulfamethoxazole-trimethoprim] Swelling    Rifamycin analogues           No current facility-administered medications on file prior to encounter.            Current Outpatient Prescriptions on File Prior to Encounter   Medication Sig    [DISCONTINUED] sulfamethoxazole-trimethoprim 800-160mg (BACTRIM DS) 800-160 mg Tab Take 2 tablets by mouth 2 (two) times daily.      Family History     Problem Relation (Age of Onset)     Heart disease Father     No Known Problems Mother               Social History Main Topics    Smoking status: Former Smoker       Types: Cigarettes       Quit date: 2/21/2013    Smokeless tobacco: Not on file    Alcohol use No    Drug use: No    Sexual activity: Not on file      Review of Systems   Unable to perform  ROS: Intubated      Objective:      Vital Signs (Most Recent):  Temp: 98.7 °F (37.1 °C) (02/22/17 1325)  Pulse: 104 (02/22/17 1419)  Resp: (!) 30 (02/22/17 1419)  BP: 128/75 (02/22/17 1419)  SpO2: 95 % (02/22/17 1419) Vital Signs (24h Range):  Temp: [98.7 °F (37.1 °C)-99 °F (37.2 °C)] 98.7 °F (37.1 °C)  Pulse: [] 104  Resp: [16-30] 30  SpO2: [87 %-100 %] 95 %  BP: (110-172)/() 128/75      Weight: (!) 176.2 kg (388 lb 7.2 oz)  Body mass index is 51.25 kg/(m^2).     Physical Exam   Constitutional: He appears well-developed and well-nourished.   HENT:   Head: Normocephalic and atraumatic.   Nose: Nose normal.   No sloughing noted in mouth in the areas visible   Eyes: Conjunctivae and EOM are normal.   Cardiovascular: Normal rate, regular rhythm, normal heart sounds and intact distal pulses.   Pulmonary/Chest: Effort normal and breath sounds normal.   Patient intubated and sedate   Abdominal: Soft. Bowel sounds are normal.   Genitourinary: Penis normal.   Neurological:   sedated   Skin: Skin is warm and dry.   Multiple tatoos noted  Left leg birth ceci noted.          Significant Labs:   Blood Culture: No results for input(s): LABBLOO in the last 48 hours.  CBC:   Recent Labs  Lab 02/22/17  0811   WBC 10.53   HGB 15.8   HCT 47.3         CMP:   Recent Labs  Lab 02/22/17  0811      K 4.1      CO2 22*      BUN 13   CREATININE 1.2   CALCIUM 9.5   PROT 7.8   ALBUMIN 4.0   BILITOT 1.1*   ALKPHOS 53*   AST 21   ALT 53*   ANIONGAP 11   EGFRNONAA >60.0      Coagulation:   Recent Labs  Lab 02/22/17  0811 02/22/17  1025   INR 1.1 1.1   APTT 26.4 --       Lactic Acid:   Recent Labs  Lab 02/22/17  1025 02/22/17  1310   LACTATE 0.7 1.4      Troponin:   Recent Labs  Lab 02/22/17  1025   TROPONINI <0.006      Urine Studies:   Recent Labs  Lab 02/22/17  1025   COLORU Yellow   APPEARANCEUA Clear   PHUR 6.0   SPECGRAV >=1.030*   PROTEINUA 2+*   GLUCUA Negative   KETONESU Negative   BILIRUBINUA  Negative   OCCULTUA Trace*   NITRITE Negative   UROBILINOGEN Negative   LEUKOCYTESUR Negative   RBCUA 2   WBCUA 2   BACTERIA Rare   SQUAMEPITHEL 1   HYALINECASTS 5*         Significant Imaging:   CXR:  1.  An endotracheal tube is in place.  The tip is located 38 mm superior to the estefany.   2.  The borders of the heart are not well seen.  There is a moderate amount of alveolar consolidation in the left perihilar region.  This may be secondary to atelectasis.  However, an infectious process cannot be excluded.  3.  There is opacification of the left costophrenic angle.  This is characteristic of a tiny left pleural effusion.     Assessment/Plan:       * Allergic reaction/ Anaphylaxis  - recent use of bactrim  - currently intubated  - monitor vitals           Abscess of axilla, right  - I&D done on 2/21/17 at [facility]  - DC bactrim due to reaction  - Cont IV vanc - monitor renal function            VTE Risk Mitigation      None             Martin Grajeda MD  Department of Hospital Medicine   [facility]

## 2017-02-23 LAB
ALBUMIN SERPL BCP-MCNC: 3.6 G/DL
ALLENS TEST: ABNORMAL
ALP SERPL-CCNC: 52 U/L
ALT SERPL W/O P-5'-P-CCNC: 57 U/L
ANION GAP SERPL CALC-SCNC: 11 MMOL/L
AST SERPL-CCNC: 31 U/L
BASOPHILS # BLD AUTO: 0.01 K/UL
BASOPHILS NFR BLD: 0.1 %
BILIRUB SERPL-MCNC: 0.7 MG/DL
BUN SERPL-MCNC: 12 MG/DL
CALCIUM SERPL-MCNC: 9 MG/DL
CHLORIDE SERPL-SCNC: 106 MMOL/L
CO2 SERPL-SCNC: 22 MMOL/L
CREAT SERPL-MCNC: 0.9 MG/DL
DELSYS: ABNORMAL
DIASTOLIC DYSFUNCTION: NO
DIFFERENTIAL METHOD: ABNORMAL
EOSINOPHIL # BLD AUTO: 0 K/UL
EOSINOPHIL NFR BLD: 0 %
ERYTHROCYTE [DISTWIDTH] IN BLOOD BY AUTOMATED COUNT: 13.3 %
ERYTHROCYTE [SEDIMENTATION RATE] IN BLOOD BY WESTERGREN METHOD: 14 MM/H
EST. GFR  (AFRICAN AMERICAN): >60 ML/MIN/1.73 M^2
EST. GFR  (NON AFRICAN AMERICAN): >60 ML/MIN/1.73 M^2
FIO2: 55
GLUCOSE SERPL-MCNC: 160 MG/DL
HCO3 UR-SCNC: 25.6 MMOL/L (ref 24–28)
HCT VFR BLD AUTO: 44.1 %
HGB BLD-MCNC: 15.3 G/DL
LYMPHOCYTES # BLD AUTO: 0.8 K/UL
LYMPHOCYTES NFR BLD: 4.9 %
MCH RBC QN AUTO: 31.2 PG
MCHC RBC AUTO-ENTMCNC: 34.7 %
MCV RBC AUTO: 90 FL
MODE: ABNORMAL
MONOCYTES # BLD AUTO: 0.9 K/UL
MONOCYTES NFR BLD: 5.1 %
NEUTROPHILS # BLD AUTO: 15.3 K/UL
NEUTROPHILS NFR BLD: 89.9 %
PCO2 BLDA: 49.1 MMHG (ref 35–45)
PEEP: 5
PH SMN: 7.33 [PH] (ref 7.35–7.45)
PLATELET # BLD AUTO: 233 K/UL
PMV BLD AUTO: 9.2 FL
PO2 BLDA: 71 MMHG (ref 80–100)
POC BE: 0 MMOL/L
POC SATURATED O2: 92 % (ref 95–100)
POTASSIUM SERPL-SCNC: 4.6 MMOL/L
PROT SERPL-MCNC: 7.3 G/DL
RBC # BLD AUTO: 4.9 M/UL
RETIRED EF AND QEF - SEE NOTES: 55 (ref 55–65)
SAMPLE: ABNORMAL
SITE: ABNORMAL
SODIUM SERPL-SCNC: 139 MMOL/L
VT: 500
WBC # BLD AUTO: 17.03 K/UL

## 2017-02-23 PROCEDURE — 25000003 PHARM REV CODE 250: Performed by: NURSE PRACTITIONER

## 2017-02-23 PROCEDURE — 63600175 PHARM REV CODE 636 W HCPCS: Performed by: FAMILY MEDICINE

## 2017-02-23 PROCEDURE — 36415 COLL VENOUS BLD VENIPUNCTURE: CPT

## 2017-02-23 PROCEDURE — 99900035 HC TECH TIME PER 15 MIN (STAT)

## 2017-02-23 PROCEDURE — 94003 VENT MGMT INPAT SUBQ DAY: CPT

## 2017-02-23 PROCEDURE — 63600175 PHARM REV CODE 636 W HCPCS

## 2017-02-23 PROCEDURE — 25000242 PHARM REV CODE 250 ALT 637 W/ HCPCS: Performed by: INTERNAL MEDICINE

## 2017-02-23 PROCEDURE — 87070 CULTURE OTHR SPECIMN AEROBIC: CPT

## 2017-02-23 PROCEDURE — 27200966 HC CLOSED SUCTION SYSTEM

## 2017-02-23 PROCEDURE — 99900026 HC AIRWAY MAINTENANCE (STAT)

## 2017-02-23 PROCEDURE — 99291 CRITICAL CARE FIRST HOUR: CPT | Mod: ,,, | Performed by: INTERNAL MEDICINE

## 2017-02-23 PROCEDURE — 63600175 PHARM REV CODE 636 W HCPCS: Performed by: INTERNAL MEDICINE

## 2017-02-23 PROCEDURE — 25000003 PHARM REV CODE 250: Performed by: INTERNAL MEDICINE

## 2017-02-23 PROCEDURE — 94640 AIRWAY INHALATION TREATMENT: CPT

## 2017-02-23 PROCEDURE — 93306 TTE W/DOPPLER COMPLETE: CPT | Mod: 26,,, | Performed by: INTERNAL MEDICINE

## 2017-02-23 PROCEDURE — 63600175 PHARM REV CODE 636 W HCPCS: Performed by: HOSPITALIST

## 2017-02-23 PROCEDURE — 87205 SMEAR GRAM STAIN: CPT

## 2017-02-23 PROCEDURE — 5A1955Z RESPIRATORY VENTILATION, GREATER THAN 96 CONSECUTIVE HOURS: ICD-10-PCS | Performed by: EMERGENCY MEDICINE

## 2017-02-23 PROCEDURE — 25000003 PHARM REV CODE 250: Performed by: FAMILY MEDICINE

## 2017-02-23 PROCEDURE — 0BH17EZ INSERTION OF ENDOTRACHEAL AIRWAY INTO TRACHEA, VIA NATURAL OR ARTIFICIAL OPENING: ICD-10-PCS | Performed by: EMERGENCY MEDICINE

## 2017-02-23 PROCEDURE — 82803 BLOOD GASES ANY COMBINATION: CPT

## 2017-02-23 PROCEDURE — 25000003 PHARM REV CODE 250: Performed by: EMERGENCY MEDICINE

## 2017-02-23 PROCEDURE — 36600 WITHDRAWAL OF ARTERIAL BLOOD: CPT

## 2017-02-23 PROCEDURE — 80202 ASSAY OF VANCOMYCIN: CPT

## 2017-02-23 PROCEDURE — 80053 COMPREHEN METABOLIC PANEL: CPT

## 2017-02-23 PROCEDURE — 63600175 PHARM REV CODE 636 W HCPCS: Performed by: NURSE PRACTITIONER

## 2017-02-23 PROCEDURE — 85025 COMPLETE CBC W/AUTO DIFF WBC: CPT

## 2017-02-23 PROCEDURE — 20000000 HC ICU ROOM

## 2017-02-23 PROCEDURE — 93306 TTE W/DOPPLER COMPLETE: CPT

## 2017-02-23 RX ORDER — FENTANYL CITRAT/DEXTROSE 5%/PF 100 MCG/10
PATIENT CONTROLLED ANALGESIA SYRINGE INTRAVENOUS CONTINUOUS
Status: DISCONTINUED | OUTPATIENT
Start: 2017-02-23 | End: 2017-02-23

## 2017-02-23 RX ORDER — METHYLPREDNISOLONE SOD SUCC 125 MG
80 VIAL (EA) INJECTION EVERY 6 HOURS
Status: DISCONTINUED | OUTPATIENT
Start: 2017-02-23 | End: 2017-02-24

## 2017-02-23 RX ORDER — CHLORHEXIDINE GLUCONATE ORAL RINSE 1.2 MG/ML
15 SOLUTION DENTAL 2 TIMES DAILY
Status: DISCONTINUED | OUTPATIENT
Start: 2017-02-23 | End: 2017-03-01

## 2017-02-23 RX ORDER — ACETAMINOPHEN 650 MG/20.3ML
650 LIQUID ORAL EVERY 4 HOURS PRN
Status: DISCONTINUED | OUTPATIENT
Start: 2017-02-23 | End: 2017-03-08 | Stop reason: HOSPADM

## 2017-02-23 RX ORDER — BISACODYL 10 MG
10 SUPPOSITORY, RECTAL RECTAL DAILY PRN
Status: DISCONTINUED | OUTPATIENT
Start: 2017-02-23 | End: 2017-03-08 | Stop reason: HOSPADM

## 2017-02-23 RX ORDER — PROPOFOL 10 MG/ML
15 INJECTION, EMULSION INTRAVENOUS CONTINUOUS
Status: DISCONTINUED | OUTPATIENT
Start: 2017-02-23 | End: 2017-03-01

## 2017-02-23 RX ORDER — METHYLPREDNISOLONE SOD SUCC 125 MG
40 VIAL (EA) INJECTION EVERY 6 HOURS
Status: DISCONTINUED | OUTPATIENT
Start: 2017-02-23 | End: 2017-02-23

## 2017-02-23 RX ORDER — LORAZEPAM 2 MG/ML
2 INJECTION INTRAMUSCULAR EVERY 4 HOURS PRN
Status: DISCONTINUED | OUTPATIENT
Start: 2017-02-23 | End: 2017-03-01

## 2017-02-23 RX ORDER — FENTANYL CITRAT/DEXTROSE 5%/PF 100 MCG/10
PATIENT CONTROLLED ANALGESIA SYRINGE INTRAVENOUS CONTINUOUS
Status: DISCONTINUED | OUTPATIENT
Start: 2017-02-23 | End: 2017-03-01

## 2017-02-23 RX ORDER — LORAZEPAM 2 MG/ML
INJECTION INTRAMUSCULAR
Status: COMPLETED
Start: 2017-02-23 | End: 2017-02-23

## 2017-02-23 RX ORDER — LORAZEPAM 2 MG/ML
4 INJECTION INTRAMUSCULAR ONCE
Status: COMPLETED | OUTPATIENT
Start: 2017-02-23 | End: 2017-02-23

## 2017-02-23 RX ORDER — CHLORHEXIDINE GLUCONATE ORAL RINSE 1.2 MG/ML
15 SOLUTION DENTAL 2 TIMES DAILY
Status: DISCONTINUED | OUTPATIENT
Start: 2017-02-23 | End: 2017-02-23

## 2017-02-23 RX ADMIN — IPRATROPIUM BROMIDE AND ALBUTEROL SULFATE 3 ML: .5; 3 SOLUTION RESPIRATORY (INHALATION) at 12:02

## 2017-02-23 RX ADMIN — PROPOFOL 50 MCG/KG/MIN: 10 INJECTION, EMULSION INTRAVENOUS at 07:02

## 2017-02-23 RX ADMIN — PROPOFOL 50 MCG/KG/MIN: 10 INJECTION, EMULSION INTRAVENOUS at 09:02

## 2017-02-23 RX ADMIN — PROPOFOL 43 MCG/KG/MIN: 10 INJECTION, EMULSION INTRAVENOUS at 12:02

## 2017-02-23 RX ADMIN — FAMOTIDINE 20 MG: 20 INJECTION, SOLUTION INTRAVENOUS at 08:02

## 2017-02-23 RX ADMIN — PROPOFOL 43 MCG/KG/MIN: 10 INJECTION, EMULSION INTRAVENOUS at 05:02

## 2017-02-23 RX ADMIN — PROPOFOL 45 MCG/KG/MIN: 10 INJECTION, EMULSION INTRAVENOUS at 07:02

## 2017-02-23 RX ADMIN — METHYLPREDNISOLONE SODIUM SUCCINATE 40 MG: 125 INJECTION, POWDER, FOR SOLUTION INTRAMUSCULAR; INTRAVENOUS at 04:02

## 2017-02-23 RX ADMIN — CHLORHEXIDINE GLUCONATE 15 ML: 1.2 RINSE ORAL at 05:02

## 2017-02-23 RX ADMIN — METHYLPREDNISOLONE SODIUM SUCCINATE 80 MG: 125 INJECTION, POWDER, FOR SOLUTION INTRAMUSCULAR; INTRAVENOUS at 05:02

## 2017-02-23 RX ADMIN — METHYLPREDNISOLONE SODIUM SUCCINATE 80 MG: 125 INJECTION, POWDER, FOR SOLUTION INTRAMUSCULAR; INTRAVENOUS at 11:02

## 2017-02-23 RX ADMIN — VANCOMYCIN HYDROCHLORIDE 1500 MG: 1 INJECTION, POWDER, LYOPHILIZED, FOR SOLUTION INTRAVENOUS at 01:02

## 2017-02-23 RX ADMIN — LORAZEPAM 4 MG: 2 INJECTION INTRAMUSCULAR at 04:02

## 2017-02-23 RX ADMIN — LORAZEPAM 4 MG: 2 INJECTION, SOLUTION INTRAMUSCULAR; INTRAVENOUS at 04:02

## 2017-02-23 RX ADMIN — FAMOTIDINE 20 MG: 20 INJECTION, SOLUTION INTRAVENOUS at 09:02

## 2017-02-23 RX ADMIN — SODIUM CHLORIDE: 0.9 INJECTION, SOLUTION INTRAVENOUS at 01:02

## 2017-02-23 RX ADMIN — PROPOFOL 50 MCG/KG/MIN: 10 INJECTION, EMULSION INTRAVENOUS at 11:02

## 2017-02-23 RX ADMIN — PROPOFOL 35 MCG/KG/MIN: 10 INJECTION, EMULSION INTRAVENOUS at 03:02

## 2017-02-23 RX ADMIN — IPRATROPIUM BROMIDE AND ALBUTEROL SULFATE 3 ML: .5; 3 SOLUTION RESPIRATORY (INHALATION) at 07:02

## 2017-02-23 RX ADMIN — PROPOFOL 45.02 MCG/KG/MIN: 10 INJECTION, EMULSION INTRAVENOUS at 05:02

## 2017-02-23 RX ADMIN — PROPOFOL 45 MCG/KG/MIN: 10 INJECTION, EMULSION INTRAVENOUS at 10:02

## 2017-02-23 RX ADMIN — METHYLPREDNISOLONE SODIUM SUCCINATE 80 MG: 125 INJECTION, POWDER, FOR SOLUTION INTRAMUSCULAR; INTRAVENOUS at 01:02

## 2017-02-23 RX ADMIN — PROPOFOL 43 MCG/KG/MIN: 10 INJECTION, EMULSION INTRAVENOUS at 03:02

## 2017-02-23 RX ADMIN — IPRATROPIUM BROMIDE AND ALBUTEROL SULFATE 3 ML: .5; 3 SOLUTION RESPIRATORY (INHALATION) at 08:02

## 2017-02-23 RX ADMIN — ENOXAPARIN SODIUM 40 MG: 100 INJECTION SUBCUTANEOUS at 01:02

## 2017-02-23 NOTE — CONSULTS
[facility] -   Critical Care Medicine  Consult Note    Patient Name: Matty Nunn  MRN: [id number]  Admission Date: 2/22/2017  Hospital Length of Stay: 0 days  Code Status: Full Code  Attending Physician: Martin Grajeda MD   Primary Care Provider: Ruddy Noble MD   Principal Problem: Allergic reaction    Inpatient consult to Pulmonology  Consult performed by: COLLIN POWERS  Consult ordered by: DARRIN KUHN        Subjective: Sedated on mechanical ventilation     HPI: 37 y/o with history of immune deficiency disorder took bactrim yesterday for cellulitis. Developed shortness of breath, respiratory distress in an  allergic reaction  and was intubated in Emergency Room. Patient unable to give history - info obtained from Emergency Room staff, wife and record    Hospital/ICU Course: Self extubated and needed to be re-intubated    Past Medical History   Diagnosis Date    Gout        History reviewed. No pertinent past surgical history.    Review of patient's allergies indicates:   Allergen Reactions    Bactrim [sulfamethoxazole-trimethoprim] Swelling    Rifamycin analogues        Family History     Problem Relation (Age of Onset)    Heart disease Father    No Known Problems Mother          Social History Main Topics    Smoking status: Former Smoker     Types: Cigarettes     Quit date: 2/21/2013    Smokeless tobacco: Not on file    Alcohol use No    Drug use: No    Sexual activity: Not on file        Review of Systems  Objective:     Vital Signs (Most Recent):  Temp: 97.9 °F (36.6 °C) (02/22/17 1700)  Pulse: (!) 113 (02/22/17 2120)  Resp: (!) 26 (02/22/17 2120)  BP: 121/78 (02/22/17 2038)  SpO2: (!) 93 % (02/22/17 2120) Vital Signs (24h Range):  Temp:  [97.9 °F (36.6 °C)-99.3 °F (37.4 °C)] 97.9 °F (36.6 °C)  Pulse:  [] 113  Resp:  [16-35] 26  SpO2:  [87 %-100 %] 93 %  BP: (110-172)/() 121/78     Weight: (!) 176.2 kg (388 lb 7.2 oz)  Body mass index is 51.25  kg/(m^2).      Intake/Output Summary (Last 24 hours) at 02/22/17 2138  Last data filed at 02/22/17 1845   Gross per 24 hour   Intake                0 ml   Output             1500 ml   Net            -1500 ml       Physical Exam    Vents:  Vent Mode: A/C (02/22/17 2120)  Ventilator Initiated: Yes (02/22/17 0845)  Set Rate: 14 bmp (02/22/17 2120)  Vt Set: 500 mL (02/22/17 2120)  Pressure Support: 0 cmH20 (02/22/17 2120)  PEEP/CPAP: 5 cmH20 (02/22/17 2120)  Oxygen Concentration (%): 55 (02/22/17 2120)  Peak Airway Pressure: 21 cmH2O (02/22/17 2120)  Total Ve: 13.9 mL (02/22/17 2120)  F/VT Ratio<105 (RSBI): (!) (P) 46.1 (02/22/17 2120)    Lines/Drains/Airways     Drain                 Urethral Catheter 02/22/17 1014 Latex 16 Fr. less than 1 day          Airway                 Airway - Non-Surgical 02/22/17 0921 Endotracheal Tube less than 1 day          Peripheral Intravenous Line                 Peripheral IV - Single Lumen 02/22/17 0925 Left Antecubital less than 1 day         Peripheral IV - Single Lumen 02/22/17 1044 Right Hand less than 1 day                Significant Labs:    CBC/Anemia Profile:    Recent Labs  Lab 02/22/17  0811   WBC 10.53   HGB 15.8   HCT 47.3      MCV 91   RDW 13.6        Chemistries:    Recent Labs  Lab 02/22/17  0811      K 4.1      CO2 22*   BUN 13   CREATININE 1.2   CALCIUM 9.5   ALBUMIN 4.0   PROT 7.8   BILITOT 1.1*   ALKPHOS 53*   ALT 53*   AST 21       ABGs:   Recent Labs  Lab 02/22/17  1332   PH 7.327*   PCO2 41.5   HCO3 21.7*   POCSATURATED 98   BE -4     BMP:   Recent Labs  Lab 02/22/17  0811         K 4.1      CO2 22*   BUN 13   CREATININE 1.2   CALCIUM 9.5     CMP:   Recent Labs  Lab 02/22/17  0811      K 4.1      CO2 22*      BUN 13   CREATININE 1.2   CALCIUM 9.5   PROT 7.8   ALBUMIN 4.0   BILITOT 1.1*   ALKPHOS 53*   AST 21   ALT 53*   ANIONGAP 11   EGFRNONAA >60.0     Lactic Acid:   Recent Labs  Lab 02/22/17  1027  02/22/17  1310 02/22/17  1605   LACTATE 0.7 1.4 1.7       Significant Imaging: CXR: I have reviewed all pertinent results/findings within the past 24 hours and my personal findings are:  left lower lobe infiltrate vs. efusion    Assessment/Plan:     Active Diagnoses:    Diagnosis Date Noted POA    PRINCIPAL PROBLEM:  Allergic reaction [T78.40XA] 02/22/2017 Yes    Abscess of axilla, right [L02.411] 02/22/2017 Yes    Anaphylaxis [T78.2XXA] 02/22/2017 Yes      Problems Resolved During this Admission:    Diagnosis Date Noted Date Resolved POA          Critical Care Medicine Daily Checklist:    A: Awake: RASS Goal/Actual Goal:    Actual: [place]on Agitation Sedation Scale (RASS): Light sedation   B: Spontaneous Breathing Trial Performed?     C: SAT & SBT Coordinated?  na                      D: Delirium: CAM-ICU     E: Early Mobility Performed? No   F: Feeding Goal:    Status:     Current Diet Order   Procedures    Diet NPO      AS: Analgesia/Sedation Adequate on propofol   T: Thromboembolic Prophylaxis Y   H: HOB > 300 Yes   U: Stress Ulcer Prophylaxis (if needed) Y   G: Glucose Control fair   B: Bowel Function     I: Indwelling Catheter (Lines & Mcgill) Necessity Needed    D: De-escalation of Antimicrobials/Pharmacotherapies  on vancomicin for cellulitis    Plan for the day/ETD Transfer from Emergency Room to ICU    Code Status:  Family/Goals of Care: Full Code  discusssed      Critical Care Time: 50 min  Critical secondary to Patient has a condition that poses threat to life and bodily function: Severe Respiratory Distress      Critical care was time spent personally by me on the following activities: development of treatment plan with patient or surrogate and bedside caregivers, discussions with consultants, evaluation of patient's response to treatment, examination of patient, ordering and performing treatments and interventions, ordering and review of laboratory studies, ordering and review of radiographic  studies, pulse oximetry, re-evaluation of patient's condition.  This critical care time did not overlap with that of any other provider or involve time for any procedures.    Thank you for your consult. I will follow-up with patient. Please contact us if you have any additional questions.     Ra Banks MD  Critical Care Medicine  [facility]

## 2017-02-23 NOTE — PROGRESS NOTES
ABG's drawn per Respiratory. Hospital medicine called back orders received for steroids. VSS, no changes in status, propofol remains at 43 mcg, fentanyl at 50mcg, and saline at 125ml/hr

## 2017-02-23 NOTE — PROGRESS NOTES
Received notice of bed. Report called to RAISA Quesada. Hospital medicine called for order clarification on fentanyl drip. Vitals 147/89, hr 112, resp rate 25 sat 97 with axillary temp of 99.1

## 2017-02-23 NOTE — ED NOTES
Pt resting comfortably, wife at bedside, updated on POC, pt's wife verbalized understanding and denied questions.

## 2017-02-23 NOTE — PLAN OF CARE
Problem: Patient Care Overview  Goal: Plan of Care Review  Outcome: Ongoing (interventions implemented as appropriate)  Patient remains on ventilator with no changes.

## 2017-02-23 NOTE — PROGRESS NOTES
[facility] Hospital Medicine  Progress Note    Patient Name: Matty Nunn  MRN: [id number]  Patient Class: IP- Inpatient   Admission Date: 2/22/2017  Length of Stay: 1 days  Attending Physician: Madi Thurman MD  Primary Care Provider: Ruddy Noble MD        Subjective:     Principal Problem:Anaphylaxis    HPI:  Patient is a 35 yo male with a hx of gout presented to [facility] on 2/21/17 with a right axillary abscess which was I&D'd in the ER.  Patient was sent home on Bactrim.  He return to the ER today for increase difficulty breathing.  He was intubated in the ER, but self extubated himself.  He was then re intubated and sent to [facility] for further ICU care.      All history taken from records as pt has been intubated and sedated since prior to arrival.  Case d/w Dr. Mckinney ([facility] ER attending) who states all hx was taken from the pt prior to intubation.  Patient has a notable breath ceci on his left leg.      Hospital Course:       Past Medical History   Diagnosis Date    Gout        History reviewed. No pertinent past surgical history.    Review of patient's allergies indicates:   Allergen Reactions    Bactrim [sulfamethoxazole-trimethoprim] Swelling    Rifamycin analogues        No current facility-administered medications on file prior to encounter.      Current Outpatient Prescriptions on File Prior to Encounter   Medication Sig    [DISCONTINUED] sulfamethoxazole-trimethoprim 800-160mg (BACTRIM DS) 800-160 mg Tab Take 2 tablets by mouth 2 (two) times daily.     Family History     Problem Relation (Age of Onset)    Heart disease Father    No Known Problems Mother        Social History Main Topics    Smoking status: Former Smoker     Types: Cigarettes     Quit date: 2/21/2013    Smokeless tobacco: Not on file    Alcohol use No    Drug use: No    Sexual activity: Not on file     Review of Systems   Unable to perform ROS: Intubated     Objective:     Vital Signs  (Most Recent):  Temp: 98.7 °F (37.1 °C) (02/22/17 1325)  Pulse: 104 (02/22/17 1419)  Resp: (!) 30 (02/22/17 1419)  BP: 128/75 (02/22/17 1419)  SpO2: 95 % (02/22/17 1419) Vital Signs (24h Range):  Temp:  [98.7 °F (37.1 °C)-99 °F (37.2 °C)] 98.7 °F (37.1 °C)  Pulse:  [] 104  Resp:  [16-30] 30  SpO2:  [87 %-100 %] 95 %  BP: (110-172)/() 128/75     Weight: (!) 176.2 kg (388 lb 7.2 oz)  Body mass index is 51.25 kg/(m^2).    Physical Exam   Constitutional: He appears well-developed and well-nourished.   HENT:   Head: Normocephalic and atraumatic.   Nose: Nose normal.   No sloughing noted in mouth in the areas visible   Eyes: Conjunctivae and EOM are normal.   Cardiovascular: Normal rate, regular rhythm, normal heart sounds and intact distal pulses.    Pulmonary/Chest: Effort normal and breath sounds normal.   Patient intubated and sedate   Abdominal: Soft. Bowel sounds are normal.   Genitourinary: Penis normal.   Neurological:   sedated   Skin: Skin is warm and dry.   Multiple tatoos noted  Left leg birth ceci noted.        Significant Labs:   Blood Culture: No results for input(s): LABBLOO in the last 48 hours.  CBC:   Recent Labs  Lab 02/22/17  0811   WBC 10.53   HGB 15.8   HCT 47.3        CMP:   Recent Labs  Lab 02/22/17  0811      K 4.1      CO2 22*      BUN 13   CREATININE 1.2   CALCIUM 9.5   PROT 7.8   ALBUMIN 4.0   BILITOT 1.1*   ALKPHOS 53*   AST 21   ALT 53*   ANIONGAP 11   EGFRNONAA >60.0     Coagulation:   Recent Labs  Lab 02/22/17  0811 02/22/17  1025   INR 1.1 1.1   APTT 26.4  --      Lactic Acid:   Recent Labs  Lab 02/22/17  1025 02/22/17  1310   LACTATE 0.7 1.4     Troponin:   Recent Labs  Lab 02/22/17  1025   TROPONINI <0.006     Urine Studies:   Recent Labs  Lab 02/22/17  1025   COLORU Yellow   APPEARANCEUA Clear   PHUR 6.0   SPECGRAV >=1.030*   PROTEINUA 2+*   GLUCUA Negative   KETONESU Negative   BILIRUBINUA Negative   OCCULTUA Trace*   NITRITE Negative    UROBILINOGEN Negative   LEUKOCYTESUR Negative   RBCUA 2   WBCUA 2   BACTERIA Rare   SQUAMEPITHEL 1   HYALINECASTS 5*       Significant Imaging:   CXR:   1.  An endotracheal tube is in place.  The tip is located 38 mm superior to the estefany.   2.  The borders of the heart are not well seen.  There is a moderate amount of alveolar consolidation in the left perihilar region.  This may be secondary to atelectasis.  However, an infectious process cannot be excluded.  3.  There is opacification of the left costophrenic angle.  This is characteristic of a tiny left pleural effusion.    Assessment/Plan:      * Anaphylaxis  - patient remains intubated and sedate      Allergic reaction  - recent use of bactrim  - currently intubated  - monitor vitals  - Leukocytosis likely reactive        Abscess of axilla, right  - I&D done on 2/21/17 at [facility]  - DC bactrim due to reaction  - Cont IV vanc - monitor renal function  - Area clean and dry      VTE Risk Mitigation         Ordered     enoxaparin injection 40 mg  Daily     Route:  Subcutaneous        02/22/17 2146     Medium Risk of VTE  Once      02/22/17 1802     Place GETACHEW hose  Until discontinued      02/22/17 1802     Place sequential compression device  Until discontinued      02/22/17 1802          Martin Grajeda MD  Department of Hospital Medicine   [facility]

## 2017-02-23 NOTE — PROGRESS NOTES
Pt continues to rest in bed on the vent.  Lung sounds remain coarse more on the left than the right.  ETT remains in place and unchanged. NGT #16 [place] sump placed down left nare with one attempt at 60 cm.  Gastric PH tests at 3.  VSS no other changes in status. Hospital medicine paged for clarification of order for steroids.

## 2017-02-23 NOTE — PLAN OF CARE
Patient remains intubated this am, unable to complete initial discharge planning assessment at this time. CM to continue to assess.      02/23/17 1314   Discharge Assessment   Assessment Type Discharge Planning Assessment   Assessment information obtained from? Medical Record   Expected Length of Stay (days) (unable to determine)   Prior to hospitilization cognitive status: Alert/Oriented   Prior to hospitalization functional status: Independent   Current cognitive status: Coma/Sedated/Intubated   Current Functional Status: Completely Dependent   Able to Return to Prior Arrangements unable to determine at this time (comments)   Is patient able to care for self after discharge? Unable to determine at this time (comments)   Readmission Within The Last 30 Days no previous admission in last 30 days   Patient/Family In Agreement With Plan unable to assess

## 2017-02-23 NOTE — PROGRESS NOTES
[facility]   Critical Care Medicine  Progress Note    Patient Name: Matty Nunn  MRN: [id number]  Admission Date: 2/22/2017  Hospital Length of Stay: 1 days  Code Status: Full Code  Attending Provider: Madi Thurman MD  Primary Care Provider: Ruddy Noble MD   Principal Problem: Anaphylaxis    Subjective:     HPI:35 y/o with history of immune deficiency disorder (immunoglobulin?)  took bactrim yesterday for cellulitis. Developed shortness of breath, respiratory distress in an allergic reaction and was intubated in Emergency Room. Patient unable to give history - info obtained from Emergency Room staff, wife and record     Hospital/ICU Course: stable intubated in ICU     Interval History/Significant Events: slowly weaning oxygen,     Review of Systems   Unable to perform ROS: Intubated     Objective:     Vital Signs (Most Recent):  Temp: (!) 100.4 °F (38 °C) (02/23/17 0715)  Pulse: 110 (02/23/17 1015)  Resp: 20 (02/23/17 1015)  BP: (!) 148/64 (02/23/17 1015)  SpO2: (!) 94 % (02/23/17 1015) Vital Signs (24h Range):  Temp:  [97.9 °F (36.6 °C)-100.4 °F (38 °C)] 100.4 °F (38 °C)  Pulse:  [] 110  Resp:  [16-35] 20  SpO2:  [92 %-100 %] 94 %  BP: (114-170)/(47-95) 148/64     Weight: (!) 171.9 kg (378 lb 15.5 oz)  Body mass index is 50 kg/(m^2).      Intake/Output Summary (Last 24 hours) at 02/23/17 1112  Last data filed at 02/23/17 1000   Gross per 24 hour   Intake              610 ml   Output             4275 ml   Net            -3665 ml       Physical Exam   Constitutional: He is oriented to person, place, and time. He appears well-developed and well-nourished.       intubated   HENT:   Head: Normocephalic and atraumatic.   Mouth/Throat: No oropharyngeal exudate.   Eyes: Conjunctivae are normal. Pupils are equal, round, and reactive to light.   Neck: Neck supple. No JVD present. No tracheal deviation present. No thyromegaly present.   Cardiovascular: Normal rate, regular rhythm and  normal heart sounds.    No murmur heard.  Pulmonary/Chest: Effort normal. No respiratory distress. He has decreased breath sounds. He has no wheezes. He has no rhonchi. He has rales in the right lower field and the left lower field. He exhibits no tenderness.   Abdominal: Soft. Bowel sounds are normal.   Musculoskeletal: He exhibits no edema or tenderness.   Left foot - birth ceci   Lymphadenopathy:     He has no cervical adenopathy.   Neurological: He is alert and oriented to person, place, and time.   Skin: Skin is warm and dry.   Nursing note and vitals reviewed.      Vents:  Vent Mode: A/C (02/23/17 0915)  Ventilator Initiated: Yes (02/22/17 0845)  Set Rate: 14 bmp (02/23/17 0915)  Vt Set: 500 mL (02/23/17 0915)  Pressure Support: 0 cmH20 (02/23/17 0915)  PEEP/CPAP: 5 cmH20 (02/23/17 0915)  Oxygen Concentration (%): 55 (02/23/17 1015)  Peak Airway Pressure: 19 cmH2O (02/23/17 0915)  Total Ve: 11.2 mL (02/23/17 0915)  F/VT Ratio<105 (RSBI): (!) 40.31 (02/23/17 0915)    Lines/Drains/Airways     Drain                 Urethral Catheter 02/22/17 1014 Latex 16 Fr. 1 day         NG/OG Tube 02/23/17 0320 [place] sump 16 Fr. Left nostril less than 1 day          Airway                 Airway - Non-Surgical 02/22/17 0921 Endotracheal Tube 1 day          Peripheral Intravenous Line                 Peripheral IV - Single Lumen 02/22/17 1044 Right Hand 1 day                Significant Labs:    CBC/Anemia Profile:    Recent Labs  Lab 02/22/17  0811 02/23/17  0601   WBC 10.53 17.03*   HGB 15.8 15.3   HCT 47.3 44.1    233   MCV 91 90   RDW 13.6 13.3        Chemistries:    Recent Labs  Lab 02/22/17  0811 02/23/17  0601    139   K 4.1 4.6    106   CO2 22* 22*   BUN 13 12   CREATININE 1.2 0.9   CALCIUM 9.5 9.0   ALBUMIN 4.0 3.6   PROT 7.8 7.3   BILITOT 1.1* 0.7   ALKPHOS 53* 52*   ALT 53* 57*   AST 21 31       ABGs:   Recent Labs  Lab 02/23/17  0354   PH 7.325*   PCO2 49.1*   HCO3 25.6   POCSATURATED 92*   BE 0      BMP:   Recent Labs  Lab 02/23/17  0601   *      K 4.6      CO2 22*   BUN 12   CREATININE 0.9   CALCIUM 9.0     CMP:   Recent Labs  Lab 02/22/17  0811 02/23/17  0601    139   K 4.1 4.6    106   CO2 22* 22*    160*   BUN 13 12   CREATININE 1.2 0.9   CALCIUM 9.5 9.0   PROT 7.8 7.3   ALBUMIN 4.0 3.6   BILITOT 1.1* 0.7   ALKPHOS 53* 52*   AST 21 31   ALT 53* 57*   ANIONGAP 11 11   EGFRNONAA >60.0 >60       Significant Imaging:  CXR: I have reviewed all pertinent results/findings within the past 24 hours and my personal findings are:  left lower lobe infiltrate    Assessment/Plan:     Active Diagnoses:    Diagnosis Date Noted POA    PRINCIPAL PROBLEM:  Anaphylaxis [T78.2XXA] 02/22/2017 Yes    Allergic reaction [T78.40XA] 02/22/2017 Yes    Abscess of axilla, right [L02.411] 02/22/2017 Yes      Problems Resolved During this Admission:    Diagnosis Date Noted Date Resolved POA        Critical Care Medicine Daily Checklist:    A: Awake: RASS Goal/Actual Goal: RASS Goal: -2-->light sedation  Actual: [place] Agitation Sedation Scale (RASS): Light sedation   B: Spontaneous Breathing Trial Performed?     C: SAT & SBT Coordinated?  Y                      D: Delirium: CAM-ICU     E: Early Mobility Performed? No   F: Feeding Goal:    Status:     Current Diet Order   Procedures    Diet NPO      AS: Analgesia/Sedation adequate   T: Thromboembolic Prophylaxis Y   H: HOB > 300 Yes   U: Stress Ulcer Prophylaxis (if needed) Y   G: Glucose Control good   B: Bowel Function Stool Occurrence: 0   I: Indwelling Catheter (Lines & Mcgill) Necessity needed   D: De-escalation of Antimicrobials/Pharmacotherapies na    Plan for the day/ETD Weaning trial - may need another day    Code Status:  Family/Goals of Care: Full Code  \discussed     Critical Care Time: 45 min  Critical secondary to Patient has a condition that poses threat to life and bodily function: Severe Respiratory Distress      Critical care  was time spent personally by me on the following activities: development of treatment plan with patient or surrogate and bedside caregivers, discussions with consultants, evaluation of patient's response to treatment, examination of patient, ordering and performing treatments and interventions, ordering and review of laboratory studies, ordering and review of radiographic studies, pulse oximetry, re-evaluation of patient's condition.  This critical care time did not overlap with that of any other provider or involve time for any procedures.     Ra Banks MD  Critical Care Medicine  [facility]

## 2017-02-23 NOTE — PROGRESS NOTES
transferd to ICu via stretcher with Resp  Ambuing patient on 100 % o2.  Portable cardiac monitor/all continuous meds remain intact. BLUE SWR remian intact.

## 2017-02-23 NOTE — PHYSICIAN QUERY
"PT Name: Matty Nunn  MR #: [id number]     Physician Query Form - Documentation Clarification    Reviewer  Ext Barbara Crews RN CCDS  [id number]    This form is a permanent document in the medical record.     Query Date: February 23, 2017  By submitting this query, we are merely seeking further clarification of documentation to reflect the severity of illness of your patient. Please utilize your independent clinical judgment when addressing the question(s) below.    (The Medical record reflects the following:)      Supporting Clinical Findings Location in Medical Record     BMI  51.25    Weight 388 lbs  Height 6'1"         Noted on CN 2/22  Nursing flowsheets                                                                                      Doctor, Please specify diagnosis or diagnoses associated with above clinical findings.    Physician Use Only      [x    ] Morbid obesity    [    ] Obesity    [    ] Other______________                                                                                                                         [  ] Unable to determine            "

## 2017-02-23 NOTE — CONSULTS
Clinical Pharmacy Progress Note    Pharmacy consulted to dose vancomycin for treatment of allergic reaction  Dr ramos.     36 y.o. male  admitted for allergic reaction/ trouble breathing.     The patient has the following labs:     Date Creatinine (mg/dl)    BUN WBC Count   2/22/2017 Estimated Creatinine Clearance: 143.9 mL/min (based on Cr of 1.2). Lab Results   Component Value Date    BUN 13 02/22/2017     Lab Results   Component Value Date    WBC 10.53 02/22/2017        Ideal BW: 79.9  Actual BW: 176.2  Adjusted (Dosing) BW: 118    Tmax/24h 99.3  Cultures:   pending    Vancomycin (day 1 of therapy)     Based on Estimated Creatinine Clearance: 143.9 mL/min (based on Cr of 1.2). and weight of 118 kg, pharmacy will initiate vancomycin 1500 mg every 12 hours after 2gm loading dose and draw a trough prior to 4th dose. Trough due 2/24 at 00:00. Pharmacy will continue to follow patients clinical status, renal function, C&S, and adjust dose as necessary to maintain trough levels between 15 and 20.     Thank you for allowing us to participate in this patient's care.     Virginia Nair   2/22/2017 8:01 PM

## 2017-02-23 NOTE — ED NOTES
Pt is drowsy, restless in bed. Pt in bilateral upper extremity soft restraints. Pt on propofol and fentanyl drip. Pt has NS infusing. Pt intubated, on vent. Pt has crisostomo in place. Pt on continuous cardiac, BP, and pulse ox monitoring. Pt in NAD,VSS. Pt awaiting bed assignment in ICU. Will continue to monitor pt.

## 2017-02-23 NOTE — PROGRESS NOTES
Received notice of bed.  Report called to RASIA Quesada. Hospital medicine called for order clarification on fentanyl drip. Vitals 147/89, hr 112, resp rate 25 sat 97 with axillary temp of 99.1

## 2017-02-23 NOTE — ED NOTES
Received report from Thomas KLINE. Pt resting in bed with HOB elevated.  He is vented with a #8 ETT at 26 at the lip line. Vent= AC/500/55%/5 peep/rate 14 sats are  94%.  Current resp rate is 28. Lungs are slightly coarse abdomen is soft and NT with NABS noted. Pulses are 2+ to all 4 extremities. He follow commands and is freely moving his body. RASS -1.  Propofol increased to 43mcg mcg/kg/min. Pupils 3mm bilat and sluggish. BLUE wrist restraints in place. Wife oriented to room, and situation.  Plan of care reviewed. Bed is low and locked call bell is in reach, side rails up x2.

## 2017-02-23 NOTE — PROGRESS NOTES
Patient continues to rest well on the vent.  RASS score is -2, at -1 the patient becomes tachypneic (28) and tachycardic (117) With sats that sit at 94.  @ -2, his HR is 108 and RR is 24. ROS is unchanged.  Position in stretcher changed to decrease risk of skin break down. No changes in status.  Bed remains low and locked with side rails up x2. Wife has been provided a stretcher to rest on.  Plan of Care reviewed including but not limited to Restraints, sedation, pain management, and airway management. Wife clearly ID's patient using his name and date of birth.

## 2017-02-23 NOTE — ED NOTES
Received report from RAISA Cano. Pt in NAD,VSS, RR equal and unlabored. Pt awaiting bed assignment in ICU. Pt's bed is low, locked, and call light in reach. SR up x 2. Pt on propofol, fentanyl, and NS infusions. Pt in nonviolent soft restraints. Pt at a RASS of -1. Pt intubated and on vent. Pt on continuous cardiac, BP, and pulse ox monitoring. Will continue to monitor pt.

## 2017-02-24 PROBLEM — T50.905A HYPERGLYCEMIA, DRUG-INDUCED: Status: ACTIVE | Noted: 2017-02-24

## 2017-02-24 PROBLEM — J96.01 ACUTE RESPIRATORY FAILURE WITH HYPOXIA: Status: ACTIVE | Noted: 2017-02-24

## 2017-02-24 PROBLEM — J18.9 PNEUMONIA DUE TO INFECTIOUS ORGANISM: Status: ACTIVE | Noted: 2017-02-24

## 2017-02-24 PROBLEM — R73.9 HYPERGLYCEMIA, DRUG-INDUCED: Status: ACTIVE | Noted: 2017-02-24

## 2017-02-24 LAB
ALBUMIN SERPL BCP-MCNC: 3.7 G/DL
ALLENS TEST: ABNORMAL
ALLENS TEST: ABNORMAL
ALP SERPL-CCNC: 51 U/L
ALT SERPL W/O P-5'-P-CCNC: 53 U/L
ANION GAP SERPL CALC-SCNC: 11 MMOL/L
AST SERPL-CCNC: 40 U/L
BASOPHILS # BLD AUTO: 0.01 K/UL
BASOPHILS NFR BLD: 0.1 %
BILIRUB SERPL-MCNC: 0.5 MG/DL
BUN SERPL-MCNC: 18 MG/DL
CALCIUM SERPL-MCNC: 9.5 MG/DL
CHLORIDE SERPL-SCNC: 104 MMOL/L
CO2 SERPL-SCNC: 25 MMOL/L
CREAT SERPL-MCNC: 1 MG/DL
DELSYS: ABNORMAL
DELSYS: ABNORMAL
DIFFERENTIAL METHOD: ABNORMAL
EOSINOPHIL # BLD AUTO: 0 K/UL
EOSINOPHIL NFR BLD: 0 %
ERYTHROCYTE [DISTWIDTH] IN BLOOD BY AUTOMATED COUNT: 13.4 %
ERYTHROCYTE [SEDIMENTATION RATE] IN BLOOD BY WESTERGREN METHOD: 14 MM/H
ERYTHROCYTE [SEDIMENTATION RATE] IN BLOOD BY WESTERGREN METHOD: 14 MM/H
EST. GFR  (AFRICAN AMERICAN): >60 ML/MIN/1.73 M^2
EST. GFR  (NON AFRICAN AMERICAN): >60 ML/MIN/1.73 M^2
FIO2: 60
GLUCOSE SERPL-MCNC: 196 MG/DL
HCO3 UR-SCNC: 26.6 MMOL/L (ref 24–28)
HCO3 UR-SCNC: 26.7 MMOL/L (ref 24–28)
HCT VFR BLD AUTO: 45.3 %
HGB BLD-MCNC: 15.4 G/DL
HIV1+2 IGG SERPL QL IA.RAPID: NEGATIVE
IGA SERPL-MCNC: 400 MG/DL
IGE SERPL-ACNC: 80 IU/ML
IGG SERPL-MCNC: 1046 MG/DL
IGM SERPL-MCNC: 88 MG/DL
IP: 10
IT: 0.96
LYMPHOCYTES # BLD AUTO: 1 K/UL
LYMPHOCYTES NFR BLD: 6.4 %
MCH RBC QN AUTO: 31.2 PG
MCHC RBC AUTO-ENTMCNC: 34 %
MCV RBC AUTO: 92 FL
MODE: ABNORMAL
MODE: ABNORMAL
MONOCYTES # BLD AUTO: 0.6 K/UL
MONOCYTES NFR BLD: 3.5 %
NEUTROPHILS # BLD AUTO: 14.1 K/UL
NEUTROPHILS NFR BLD: 90 %
PCO2 BLDA: 39.1 MMHG (ref 35–45)
PCO2 BLDA: 46.3 MMHG (ref 35–45)
PEEP: 10
PEEP: 15
PH SMN: 7.37 [PH] (ref 7.35–7.45)
PH SMN: 7.44 [PH] (ref 7.35–7.45)
PLATELET # BLD AUTO: 257 K/UL
PMV BLD AUTO: 9.8 FL
PO2 BLDA: 51 MMHG (ref 80–100)
PO2 BLDA: 67 MMHG (ref 80–100)
POC BE: 1 MMOL/L
POC BE: 3 MMOL/L
POC SATURATED O2: 87 % (ref 95–100)
POC SATURATED O2: 92 % (ref 95–100)
POCT GLUCOSE: 147 MG/DL (ref 70–110)
POCT GLUCOSE: 155 MG/DL (ref 70–110)
POCT GLUCOSE: 155 MG/DL (ref 70–110)
POCT GLUCOSE: 169 MG/DL (ref 70–110)
POTASSIUM SERPL-SCNC: 4.7 MMOL/L
PROT SERPL-MCNC: 7.7 G/DL
RBC # BLD AUTO: 4.94 M/UL
SAMPLE: ABNORMAL
SAMPLE: ABNORMAL
SITE: ABNORMAL
SITE: ABNORMAL
SODIUM SERPL-SCNC: 140 MMOL/L
VANCOMYCIN TROUGH SERPL-MCNC: 5.4 UG/ML
VT: 500
WBC # BLD AUTO: 15.67 K/UL

## 2017-02-24 PROCEDURE — 25000003 PHARM REV CODE 250: Performed by: NURSE PRACTITIONER

## 2017-02-24 PROCEDURE — 25500020 PHARM REV CODE 255: Performed by: FAMILY MEDICINE

## 2017-02-24 PROCEDURE — 27200966 HC CLOSED SUCTION SYSTEM

## 2017-02-24 PROCEDURE — 99291 CRITICAL CARE FIRST HOUR: CPT | Mod: ,,, | Performed by: INTERNAL MEDICINE

## 2017-02-24 PROCEDURE — 63600175 PHARM REV CODE 636 W HCPCS: Performed by: FAMILY MEDICINE

## 2017-02-24 PROCEDURE — 97802 MEDICAL NUTRITION INDIV IN: CPT

## 2017-02-24 PROCEDURE — 80053 COMPREHEN METABOLIC PANEL: CPT

## 2017-02-24 PROCEDURE — 99900026 HC AIRWAY MAINTENANCE (STAT)

## 2017-02-24 PROCEDURE — 63600175 PHARM REV CODE 636 W HCPCS: Performed by: NURSE PRACTITIONER

## 2017-02-24 PROCEDURE — 94640 AIRWAY INHALATION TREATMENT: CPT

## 2017-02-24 PROCEDURE — 36415 COLL VENOUS BLD VENIPUNCTURE: CPT

## 2017-02-24 PROCEDURE — 82785 ASSAY OF IGE: CPT

## 2017-02-24 PROCEDURE — 25000003 PHARM REV CODE 250: Performed by: INTERNAL MEDICINE

## 2017-02-24 PROCEDURE — 25000003 PHARM REV CODE 250: Performed by: FAMILY MEDICINE

## 2017-02-24 PROCEDURE — 25000242 PHARM REV CODE 250 ALT 637 W/ HCPCS: Performed by: INTERNAL MEDICINE

## 2017-02-24 PROCEDURE — 86701 HIV-1ANTIBODY: CPT

## 2017-02-24 PROCEDURE — 94003 VENT MGMT INPAT SUBQ DAY: CPT

## 2017-02-24 PROCEDURE — 63600175 PHARM REV CODE 636 W HCPCS: Performed by: INTERNAL MEDICINE

## 2017-02-24 PROCEDURE — 99900035 HC TECH TIME PER 15 MIN (STAT)

## 2017-02-24 PROCEDURE — 82784 ASSAY IGA/IGD/IGG/IGM EACH: CPT | Mod: 59

## 2017-02-24 PROCEDURE — 82803 BLOOD GASES ANY COMBINATION: CPT

## 2017-02-24 PROCEDURE — 85025 COMPLETE CBC W/AUTO DIFF WBC: CPT

## 2017-02-24 PROCEDURE — 20000000 HC ICU ROOM

## 2017-02-24 PROCEDURE — 36600 WITHDRAWAL OF ARTERIAL BLOOD: CPT

## 2017-02-24 RX ORDER — INSULIN ASPART 100 [IU]/ML
1-10 INJECTION, SOLUTION INTRAVENOUS; SUBCUTANEOUS EVERY 4 HOURS PRN
Status: DISCONTINUED | OUTPATIENT
Start: 2017-02-24 | End: 2017-03-08 | Stop reason: HOSPADM

## 2017-02-24 RX ORDER — ACETYLCYSTEINE 100 MG/ML
4 SOLUTION ORAL; RESPIRATORY (INHALATION) EVERY 12 HOURS
Status: DISCONTINUED | OUTPATIENT
Start: 2017-02-24 | End: 2017-03-01

## 2017-02-24 RX ORDER — HYDRALAZINE HYDROCHLORIDE 20 MG/ML
10 INJECTION INTRAMUSCULAR; INTRAVENOUS EVERY 4 HOURS PRN
Status: DISCONTINUED | OUTPATIENT
Start: 2017-02-24 | End: 2017-03-01

## 2017-02-24 RX ORDER — CLINDAMYCIN PHOSPHATE 600 MG/50ML
600 INJECTION, SOLUTION INTRAVENOUS
Status: DISCONTINUED | OUTPATIENT
Start: 2017-02-24 | End: 2017-02-24

## 2017-02-24 RX ORDER — DIPHENHYDRAMINE HCL 12.5MG/5ML
25 ELIXIR ORAL EVERY 8 HOURS
Status: DISCONTINUED | OUTPATIENT
Start: 2017-02-24 | End: 2017-02-28

## 2017-02-24 RX ORDER — METHYLPREDNISOLONE SOD SUCC 125 MG
60 VIAL (EA) INJECTION EVERY 6 HOURS
Status: DISCONTINUED | OUTPATIENT
Start: 2017-02-24 | End: 2017-02-26

## 2017-02-24 RX ORDER — GLUCAGON 1 MG
1 KIT INJECTION
Status: DISCONTINUED | OUTPATIENT
Start: 2017-02-24 | End: 2017-03-08 | Stop reason: HOSPADM

## 2017-02-24 RX ADMIN — IPRATROPIUM BROMIDE AND ALBUTEROL SULFATE 3 ML: .5; 3 SOLUTION RESPIRATORY (INHALATION) at 07:02

## 2017-02-24 RX ADMIN — PROPOFOL 50 MCG/KG/MIN: 10 INJECTION, EMULSION INTRAVENOUS at 07:02

## 2017-02-24 RX ADMIN — FAMOTIDINE 20 MG: 20 INJECTION, SOLUTION INTRAVENOUS at 09:02

## 2017-02-24 RX ADMIN — PROPOFOL 50 MCG/KG/MIN: 10 INJECTION, EMULSION INTRAVENOUS at 09:02

## 2017-02-24 RX ADMIN — CHLORHEXIDINE GLUCONATE 15 ML: 1.2 RINSE ORAL at 09:02

## 2017-02-24 RX ADMIN — INSULIN ASPART 2 UNITS: 100 INJECTION, SOLUTION INTRAVENOUS; SUBCUTANEOUS at 05:02

## 2017-02-24 RX ADMIN — PIPERACILLIN SODIUM AND TAZOBACTAM SODIUM 4.5 G: 4; .5 INJECTION, POWDER, FOR SOLUTION INTRAVENOUS at 07:02

## 2017-02-24 RX ADMIN — IPRATROPIUM BROMIDE AND ALBUTEROL SULFATE 3 ML: .5; 3 SOLUTION RESPIRATORY (INHALATION) at 01:02

## 2017-02-24 RX ADMIN — METHYLPREDNISOLONE SODIUM SUCCINATE 60 MG: 125 INJECTION, POWDER, FOR SOLUTION INTRAMUSCULAR; INTRAVENOUS at 11:02

## 2017-02-24 RX ADMIN — CLINDAMYCIN IN 5 PERCENT DEXTROSE 600 MG: 12 INJECTION, SOLUTION INTRAVENOUS at 09:02

## 2017-02-24 RX ADMIN — ACETYLCYSTEINE 4 ML: 100 SOLUTION ORAL; RESPIRATORY (INHALATION) at 07:02

## 2017-02-24 RX ADMIN — METHYLPREDNISOLONE SODIUM SUCCINATE 60 MG: 125 INJECTION, POWDER, FOR SOLUTION INTRAMUSCULAR; INTRAVENOUS at 05:02

## 2017-02-24 RX ADMIN — PROPOFOL 50 MCG/KG/MIN: 10 INJECTION, EMULSION INTRAVENOUS at 01:02

## 2017-02-24 RX ADMIN — INSULIN ASPART 2 UNITS: 100 INJECTION, SOLUTION INTRAVENOUS; SUBCUTANEOUS at 10:02

## 2017-02-24 RX ADMIN — ENOXAPARIN SODIUM 40 MG: 100 INJECTION SUBCUTANEOUS at 11:02

## 2017-02-24 RX ADMIN — LORAZEPAM 2 MG: 2 INJECTION, SOLUTION INTRAMUSCULAR; INTRAVENOUS at 07:02

## 2017-02-24 RX ADMIN — DIPHENHYDRAMINE HYDROCHLORIDE 25 MG: 12.5 SOLUTION ORAL at 09:02

## 2017-02-24 RX ADMIN — DIPHENHYDRAMINE HYDROCHLORIDE 25 MG: 12.5 SOLUTION ORAL at 07:02

## 2017-02-24 RX ADMIN — Medication 2500 MCG: at 11:02

## 2017-02-24 RX ADMIN — PROPOFOL 50 MCG/KG/MIN: 10 INJECTION, EMULSION INTRAVENOUS at 11:02

## 2017-02-24 RX ADMIN — PROPOFOL 50 MCG/KG/MIN: 10 INJECTION, EMULSION INTRAVENOUS at 05:02

## 2017-02-24 RX ADMIN — IOHEXOL 100 ML: 350 INJECTION, SOLUTION INTRAVENOUS at 02:02

## 2017-02-24 RX ADMIN — PROPOFOL 50 MCG/KG/MIN: 10 INJECTION, EMULSION INTRAVENOUS at 04:02

## 2017-02-24 RX ADMIN — PROPOFOL 40 MCG/KG/MIN: 10 INJECTION, EMULSION INTRAVENOUS at 06:02

## 2017-02-24 RX ADMIN — METHYLPREDNISOLONE SODIUM SUCCINATE 80 MG: 125 INJECTION, POWDER, FOR SOLUTION INTRAMUSCULAR; INTRAVENOUS at 05:02

## 2017-02-24 RX ADMIN — INSULIN ASPART 1 UNITS: 100 INJECTION, SOLUTION INTRAVENOUS; SUBCUTANEOUS at 09:02

## 2017-02-24 RX ADMIN — PIPERACILLIN SODIUM AND TAZOBACTAM SODIUM 4.5 G: 4; .5 INJECTION, POWDER, FOR SOLUTION INTRAVENOUS at 05:02

## 2017-02-24 RX ADMIN — VANCOMYCIN HYDROCHLORIDE 1500 MG: 1 INJECTION, POWDER, LYOPHILIZED, FOR SOLUTION INTRAVENOUS at 01:02

## 2017-02-24 RX ADMIN — DIPHENHYDRAMINE HYDROCHLORIDE 25 MG: 12.5 SOLUTION ORAL at 01:02

## 2017-02-24 NOTE — PLAN OF CARE
Problem: Patient Care Overview  Goal: Plan of Care Review  Outcome: Ongoing (interventions implemented as appropriate)  Recommendation/Intervention: 1. Continue tubefeed as ordered. WIll monitor propofol use and make rate adjustment recommendations as needed 2. Once extubated advance to oral diet.  Goals: Meet needs from nutrition support until extubated  Nutrition Goal Status: new  Communication of RD Recs: discussed on rounds

## 2017-02-24 NOTE — CONSULTS
[facility]   Adult Nutrition  Consult Note    SUMMARY     Recommendations  Recommendation/Intervention: 1. Continue tubefeed as ordered. WIll monitor propofol use and make rate adjustment recommendations as needed    2. Once extubated advance to oral diet.  Goals: Meet needs from nutrition support until extubated  Nutrition Goal Status: new  Communication of RD Recs: discussed on rounds    Nutrition Discharge Plan  Home on Regular (General Healthful) diet    Reason for Assessment  Reason for Assessment:  (verbal request for tubefeed orders during MDR)  Diagnosis:  (Anaphylaxis)  Relevent Medical History: Gout   Interdisciplinary Rounds: attended  General Information Comments: Patient is NPOon vent with tubefeed ordered per RD recommendations this AM during rounds    Nutrition Prescription Ordered  Current Diet Order: NPO  Current Nutrition Support Formula Ordered:  (Peptamen Intense VHP (Bariatric))  Current Nutrition Support Rate Ordered: 60 (ml)  Current Nutrition Support Frequency Ordered: ml/hr    Evaluation of Received Nutrients/Fluid Intake  Enteral Calories (kcal): 1440  Enteral Protein (gm): 133  Enteral (Free Water) Fluid (mL): 1210  Other Calories (kcal): 1397 (propofol 52.9ml/hr)     Nutrition Risk Screen  Nutrition Risk Screen: no indicators present    Nutrition/Diet History  Typical Food/Fluid Intake: unable to obtain    Labs/Tests/Procedures/Meds  Pertinent Labs Reviewed: reviewed  Pertinent Labs Comments: Gluc 196, Alb 3.7  Pertinent Medications Reviewed: reviewed    Physical Findings  Oral/Mouth Cavity:  (white swollen tongue)  Skin:  (Marcelo 16)    Anthropometrics  Height (inches): 72.99 in  Weight Method: Bed Scale  Weight (kg): (!) 165 kg  Ideal Body Weight (IBW), Male: 183.94 lb  % Ideal Body Weight, Male (lb): 197.76 lb  BMI (kg/m2): 48  BMI Grade: greater than 40 - morbid obesity    Estimated/Assessed Needs  Weight Used For Calorie Calculations: (!) 165 kg (363 lb 12.1 oz)    Height (cm): 185.4 cm  Energy Need Method: [place] (2670 calories)  20 kcal/kg (kcal): 3300   RMR (Bolton-St. Jeor Equation): 2639.9  Weight Used For Protein Calculations: 83.6 kg (184 lb 4.9 oz) (IBW used due to critical care obesity)  2.0 gm Protein (gm): 167.55 and 2.5 gm Protein (gm): 209.44  Fluid Need Method: RDA Method (1ml/gregg or as needed)    Monitor and Evaluation  Food and Nutrient Intake: energy intake, enteral nutrition intake  Food and Nutrient Adminstration: enteral and parenteral nutrition administration, diet order  Anthropometric Measurements: weight  Biochemical Data, Medical Tests and Procedures: electrolyte and renal panel, glucose/endocrine profile    Nutrition Risk  Level of Risk: high    Nutrition Follow-Up  RD Follow-up?: Yes      Assessment and Plan  Acute respiratory failure with hypoxia  Nutrition Problem:   Swallowing difficulty    Etiology/Related to:   Inability to safely consume oral diet    As evidenced by:   Mechanical ventilation and pharmacological sedation    Treatment Strategy:   1. Enteral nutrition support to meet needs until extubated    Nutrition Diagnosis Status:   New

## 2017-02-24 NOTE — PROGRESS NOTES
[facility] -   Critical Care Medicine  Progress Note    Patient Name: Matty Nunn  MRN: [id number]  Admission Date: 2/22/2017  Hospital Length of Stay: 2 days  Code Status: Full Code  Attending Provider: Martin Grajeda MD  Primary Care Provider: Ruddy Noble MD   Principal Problem: Anaphylaxis    Subjective: worsening oxygenatino      HPI: 36 /o intubated after taking bactrm. Self extubated inER and re-intubated    Hospital/ICU Course: Over past 24 hours worsening hypoxemia    Interval History/Significant Events: increased FiO2 to 60 % and added PEEP 12 cm    Review of Systems   Unable to perform ROS: Intubated     Objective:     Vital Signs (Most Recent):  Temp: 99.3 °F (37.4 °C) (02/24/17 0700)  Pulse: (!) 116 (02/24/17 1000)  Resp: 18 (02/24/17 1000)  BP: (!) 176/103 (02/24/17 1000)  SpO2: (!) 89 % (02/24/17 1000) Vital Signs (24h Range):  Temp:  [98.9 °F (37.2 °C)-100.1 °F (37.8 °C)] 99.3 °F (37.4 °C)  Pulse:  [106-118] 116  Resp:  [13-29] 18  SpO2:  [88 %-98 %] 89 %  BP: (131-192)/() 176/103     Weight: (!) 165 kg (363 lb 12.1 oz)  Body mass index is 47.99 kg/(m^2).      Intake/Output Summary (Last 24 hours) at 02/24/17 1025  Last data filed at 02/24/17 1000   Gross per 24 hour   Intake          2082.64 ml   Output             3245 ml   Net         -1162.36 ml       Physical Exam   Constitutional: He appears well-developed and well-nourished.   HENT:   Head: Normocephalic and atraumatic.   Eyes: Conjunctivae are normal. Pupils are equal, round, and reactive to light.   Neck: Neck supple. No JVD present. No tracheal deviation present. No thyromegaly present.   Cardiovascular: Normal rate, regular rhythm and normal heart sounds.    Pulmonary/Chest: Effort normal. He has decreased breath sounds. He has rales in the right middle field, the right lower field, the left middle field and the left lower field.   Abdominal: Soft.   Musculoskeletal:   abcess in left axilla - drained     Lymphadenopathy:     He has no cervical adenopathy.   Neurological:   Sedated on ventilator   Skin: Skin is warm and dry.   Skin birth ceci left leg   Nursing note and vitals reviewed.      Vents:  Vent Mode: A/C (02/24/17 0948)  Ventilator Initiated: Yes (02/22/17 0845)  Set Rate: 14 bmp (02/24/17 0948)  Vt Set: 500 mL (02/24/17 0948)  Pressure Support: 0 cmH20 (02/24/17 0948)  PEEP/CPAP: 10 cmH20 (02/24/17 0948)  Oxygen Concentration (%): 60 (02/24/17 1000)  Peak Airway Pressure: 18 cmH2O (02/24/17 0948)  Total Ve: 13.9 mL (02/24/17 0948)  F/VT Ratio<105 (RSBI): (!) 53.7 (02/24/17 0948)    Lines/Drains/Airways     Drain                 Urethral Catheter 02/22/17 1014 Latex 16 Fr. 2 days         NG/OG Tube 02/23/17 0320 [place]ubon sump 16 Fr. Left nostril 1 day          Airway                 Airway - Non-Surgical 02/22/17 0921 Endotracheal Tube 2 days          Peripheral Intravenous Line                 Peripheral IV - Single Lumen 02/22/17 1044 Right Hand 1 day         Peripheral IV - Single Lumen 02/23/17 1905 Left Hand less than 1 day                Significant Labs:    CBC/Anemia Profile:    Recent Labs  Lab 02/23/17  0601 02/24/17  0330   WBC 17.03* 15.67*   HGB 15.3 15.4   HCT 44.1 45.3    257   MCV 90 92   RDW 13.3 13.4        Chemistries:    Recent Labs  Lab 02/23/17  0601 02/24/17  0330    140   K 4.6 4.7    104   CO2 22* 25   BUN 12 18   CREATININE 0.9 1.0   CALCIUM 9.0 9.5   ALBUMIN 3.6 3.7   PROT 7.3 7.7   BILITOT 0.7 0.5   ALKPHOS 52* 51*   ALT 57* 53*   AST 31 40       Blood Culture:   Recent Labs  Lab 02/22/17  1045   LABBLOO No Growth to date  No Growth to date     BMP:   Recent Labs  Lab 02/24/17  0330   *      K 4.7      CO2 25   BUN 18   CREATININE 1.0   CALCIUM 9.5     CMP:   Recent Labs  Lab 02/23/17  0601 02/24/17  0330    140   K 4.6 4.7    104   CO2 22* 25   * 196*   BUN 12 18   CREATININE 0.9 1.0   CALCIUM 9.0 9.5   PROT 7.3 7.7    ALBUMIN 3.6 3.7   BILITOT 0.7 0.5   ALKPHOS 52* 51*   AST 31 40   ALT 57* 53*   ANIONGAP 11 11   EGFRNONAA >60 >60     Cardiac Markers: No results for input(s): CKMB, TROPONINT, MYOGLOBIN in the last 48 hours.  Lactic Acid:   Recent Labs  Lab 02/22/17  1310 02/22/17  1605   LACTATE 1.4 1.7     Respiratory Culture:   Recent Labs  Lab 02/23/17  1647   GSRESP >10 epithelial cells per low power field  Many WBC's  Many Gram negative rods  Few Gram positive cocci  Rare Gram negative diplococci     Troponin: No results for input(s): TROPONINI in the last 48 hours.    Significant Imaging:  CXR: I have reviewed all pertinent results/findings within the past 24 hours and my personal findings are:  bilateral infiltrates    Assessment/Plan:     Active Diagnoses:    Diagnosis Date Noted POA    PRINCIPAL PROBLEM:  Anaphylaxis [T78.2XXA] 02/22/2017 Yes    Allergic reaction [T78.40XA] 02/22/2017 Yes    Abscess of axilla, right [L02.411] 02/22/2017 Yes    Acute respiratory failure with hypoxia [J96.01] 02/24/2017 Yes    Pneumonia due to infectious organism [J18.9] 02/24/2017 Yes    Hyperglycemia, drug-induced [R73.9] 02/24/2017 No      Problems Resolved During this Admission:    Diagnosis Date Noted Date Resolved POA        Critical Care Medicine Daily Checklist:    A: Awake: RASS Goal/Actual Goal: RASS Goal: -2-->light sedation  Actual: Dubon Agitation Sedation Scale (RASS): Light sedation   B: Spontaneous Breathing Trial Performed?     C: SAT & SBT Coordinated?  No - worsening hypoxemai                      D: Delirium: CAM-ICU     E: Early Mobility Performed? No   F: Feeding Goal:    Status:     Current Diet Order   Procedures    Diet NPO      AS: Analgesia/Sedation adequate   T: Thromboembolic Prophylaxis Y   H: HOB > 300 Yes   U: Stress Ulcer Prophylaxis (if needed) Y   G: Glucose Control fair   B: Bowel Function Stool Occurrence: 0   I: Indwelling Catheter (Lines & Mcgill) Necessity Needed on vent   D:  De-escalation of Antimicrobials/Pharmacotherapies na    Plan for the day/ETD Increase PEEP    Code Status:  Family/Goals of Care: Full Code  Discussed with wife     Critical Care Time: 40  Critical secondary to Patient has a condition that poses threat to life and bodily function: Severe Respiratory Distress      Critical care was time spent personally by me on the following activities: development of treatment plan with patient or surrogate and bedside caregivers, discussions with consultants, evaluation of patient's response to treatment, examination of patient, ordering and performing treatments and interventions, ordering and review of laboratory studies, ordering and review of radiographic studies, pulse oximetry, re-evaluation of patient's condition.  This critical care time did not overlap with that of any other provider or involve time for any procedures.     Ra Banks MD  Critical Care Medicine  [facility] -

## 2017-02-24 NOTE — SUBJECTIVE & OBJECTIVE
Interval History: Patient remains intubated and sedate.  CXR has worsening, antibx adjusted.    Review of Systems   Unable to perform ROS: Intubated     Objective:     Vital Signs (Most Recent):  Temp: 99.3 °F (37.4 °C) (02/24/17 0700)  Pulse: 108 (02/24/17 0737)  Resp: (!) 24 (02/24/17 0737)  BP: (!) 171/89 (02/24/17 0700)  SpO2: (!) 92 % (02/24/17 0737) Vital Signs (24h Range):  Temp:  [98.9 °F (37.2 °C)-100.1 °F (37.8 °C)] 99.3 °F (37.4 °C)  Pulse:  [106-118] 108  Resp:  [13-28] 24  SpO2:  [90 %-98 %] 92 %  BP: (131-174)/() 171/89     Weight: (!) 165 kg (363 lb 12.1 oz)  Body mass index is 47.99 kg/(m^2).    Intake/Output Summary (Last 24 hours) at 02/24/17 0915  Last data filed at 02/24/17 0742   Gross per 24 hour   Intake          2032.64 ml   Output             3220 ml   Net         -1187.36 ml      Physical Exam   Constitutional: He appears well-developed and well-nourished.   HENT:   Head: Normocephalic and atraumatic.   Nose: Nose normal.   No sloughing noted in mouth in the areas visible   Eyes: Conjunctivae and EOM are normal.   Cardiovascular: Normal rate, regular rhythm, normal heart sounds and intact distal pulses.    Pulmonary/Chest: Effort normal and breath sounds normal.   Patient intubated   Abdominal: Soft. Bowel sounds are normal.   Genitourinary: Penis normal.   Neurological:   sedated   Skin: Skin is warm and dry.   Multiple tatoos noted  Left leg birth ceci noted.       Significant Labs:   BMP:   Recent Labs  Lab 02/24/17  0330   *      K 4.7      CO2 25   BUN 18   CREATININE 1.0   CALCIUM 9.5     CBC:   Recent Labs  Lab 02/23/17  0601 02/24/17  0330   WBC 17.03* 15.67*   HGB 15.3 15.4   HCT 44.1 45.3    257       Significant Imaging: CXR: I have reviewed all pertinent results/findings within the past 24 hours and my personal findings are:  Worsening infiltrates B/L L>R

## 2017-02-24 NOTE — PLAN OF CARE
Problem: Patient Care Overview  Goal: Plan of Care Review  Outcome: Ongoing (interventions implemented as appropriate)  Pt diuresed over 2.5 liters today.  Unable to wean fi02 down below 50% and hold.  Pts sats down to the upper 80s with that oxygen concentration.  Felipe, NP changed vent settings for the night.  Plan is to let pt rest throughout the night and attempt to wean to extubate in the am.

## 2017-02-24 NOTE — ASSESSMENT & PLAN NOTE
Nutrition Problem:   Swallowing difficulty    Etiology/Related to:   Inability to safely consume oral diet    As evidenced by:   Mechanical ventilation and pharmacological sedation    Treatment Strategy:   1. Enteral nutrition support to meet needs until extubated    Nutrition Diagnosis Status:   New

## 2017-02-24 NOTE — PLAN OF CARE
Problem: Patient Care Overview  Goal: Plan of Care Review  Outcome: Ongoing (interventions implemented as appropriate)  Patient rested in between care. Fentanyl at 50mcg / hr and Propofol at 50 mcg/kg/hr to achieve RASS of -2. Sinus tach 110-120 on telemetry monitor during the night. Hemodynamically stable without vasoactive support. O2 sats via pulse oximetry >92 % on current vent settings (AC VC+ 14 freq, Vt 500, FiO2 60%, 10 PEEP. Breath sounds diminished bilat. Urine output averaging 60 mL / hr. Patient turned and repositioned q2 hours with use of wedge. Bilat soft wrist restraints remain in place for patient safety and to prevent treatment interference. Spouse updated to plan of care and no further questions at this time.

## 2017-02-24 NOTE — PROGRESS NOTES
[facility] Hospital Medicine  Progress Note    Patient Name: Matty Nunn  MRN: [id number]  Patient Class: IP- Inpatient   Admission Date: 2/22/2017  Length of Stay: 2 days  Attending Physician: Madi Thurman MD  Primary Care Provider: Ruddy Noble MD        Subjective:     Principal Problem:Anaphylaxis    HPI:  Patient is a 35 yo male with a hx of gout presented to [facility] on 2/21/17 with a right axillary abscess which was I&D'd in the ER.  Patient was sent home on Bactrim.  He return to the ER today for increase difficulty breathing.  He was intubated in the ER, but self extubated himself.  He was then re intubated and sent to [facility] for further ICU care.      All history taken from records as pt has been intubated and sedated since prior to arrival.  Case d/w Dr. Mckinney ([facility] ER attending) who states all hx was taken from the pt prior to intubation.  Patient has a notable breath ceci on his left leg.      Hospital Course:       Interval History: Patient remains intubated and sedate.  CXR has worsening, antibx adjusted.    Review of Systems   Unable to perform ROS: Intubated     Objective:     Vital Signs (Most Recent):  Temp: 99.3 °F (37.4 °C) (02/24/17 0700)  Pulse: 108 (02/24/17 0737)  Resp: (!) 24 (02/24/17 0737)  BP: (!) 171/89 (02/24/17 0700)  SpO2: (!) 92 % (02/24/17 0737) Vital Signs (24h Range):  Temp:  [98.9 °F (37.2 °C)-100.1 °F (37.8 °C)] 99.3 °F (37.4 °C)  Pulse:  [106-118] 108  Resp:  [13-28] 24  SpO2:  [90 %-98 %] 92 %  BP: (131-174)/() 171/89     Weight: (!) 165 kg (363 lb 12.1 oz)  Body mass index is 47.99 kg/(m^2).    Intake/Output Summary (Last 24 hours) at 02/24/17 0915  Last data filed at 02/24/17 0742   Gross per 24 hour   Intake          2032.64 ml   Output             3220 ml   Net         -1187.36 ml      Physical Exam   Constitutional: He appears well-developed and well-nourished.   HENT:   Head: Normocephalic and atraumatic.   Nose: Nose  normal.   No sloughing noted in mouth in the areas visible   Eyes: Conjunctivae and EOM are normal.   Cardiovascular: Normal rate, regular rhythm, normal heart sounds and intact distal pulses.    Pulmonary/Chest: Effort normal and breath sounds normal.   Patient intubated   Abdominal: Soft. Bowel sounds are normal.   Genitourinary: Penis normal.   Neurological:   sedated   Skin: Skin is warm and dry.   Multiple tatoos noted  Left leg birth ceci noted.       Significant Labs:   BMP:   Recent Labs  Lab 02/24/17  0330   *      K 4.7      CO2 25   BUN 18   CREATININE 1.0   CALCIUM 9.5     CBC:   Recent Labs  Lab 02/23/17  0601 02/24/17  0330   WBC 17.03* 15.67*   HGB 15.3 15.4   HCT 44.1 45.3    257       Significant Imaging: CXR: I have reviewed all pertinent results/findings within the past 24 hours and my personal findings are:  Worsening infiltrates B/L L>R    Assessment/Plan:      * Anaphylaxis  - patient remains intubated and sedate      Allergic reaction  - recent use of bactrim  - currently intubated  - monitor vitals          Abscess of axilla, right  - I&D done on 2/21/17 at [facility]  - DC bactrim due to reaction  - DC vanc, cont zosyn and clindamycin  - Area clean and dry      Acute respiratory failure with hypoxia  See above      Pneumonia due to infectious organism  - CXR noted  - Patient started on Zosyn and Clindamycin        Hyperglycemia, drug-induced  - Accuchecks and SSI  - 2/2 steroids      VTE Risk Mitigation         Ordered     enoxaparin injection 40 mg  Daily     Route:  Subcutaneous        02/22/17 2146     Medium Risk of VTE  Once      02/22/17 1802     Place GETACHEW hose  Until discontinued      02/22/17 1802     Place sequential compression device  Until discontinued      02/22/17 1802          Martin Grajeda MD  Department of Hospital Medicine   [facility]

## 2017-02-24 NOTE — PROGRESS NOTES
Trough = 5.4 mcg/mL @ 2356 on 2/23/17  Trough goal = 10-15 mcg/mL  Dx: Cellulitis (Right Axillary Abscess I & D'd on 2/21/17) and prescribed Bactrim DS. Returned on 2/23/17 due to allergic reaction to Bactrim.    We will consult with Dr Roa about increasing his dose to   Vanco 2 G ever 12 hours due to his large BSA and weight.     WBC = 17.03 (increased from 10.53 yesterday)  Tmax = 100.4  Cultures:     Blood x 2 (2/22-2/23) -- No growth so far     Sputum pending  SCr = 0.9 (down from 1.2 yesterday)  Santiago Butcher, PharmD

## 2017-02-24 NOTE — ASSESSMENT & PLAN NOTE
- I&D done on 2/21/17 at [facility]  - DC bactrim due to reaction  - DC vanc, cont zosyn and clindamycin  - Area clean and dry

## 2017-02-25 LAB
ALBUMIN SERPL BCP-MCNC: 3.3 G/DL
ALLENS TEST: ABNORMAL
ALP SERPL-CCNC: 43 U/L
ALT SERPL W/O P-5'-P-CCNC: 40 U/L
ANION GAP SERPL CALC-SCNC: 9 MMOL/L
AST SERPL-CCNC: 33 U/L
BASOPHILS # BLD AUTO: 0 K/UL
BASOPHILS NFR BLD: 0 %
BILIRUB SERPL-MCNC: 0.4 MG/DL
BUN SERPL-MCNC: 23 MG/DL
CALCIUM SERPL-MCNC: 9.1 MG/DL
CHLORIDE SERPL-SCNC: 101 MMOL/L
CO2 SERPL-SCNC: 28 MMOL/L
CREAT SERPL-MCNC: 1.1 MG/DL
DELSYS: ABNORMAL
DIFFERENTIAL METHOD: ABNORMAL
EOSINOPHIL # BLD AUTO: 0 K/UL
EOSINOPHIL NFR BLD: 0 %
ERYTHROCYTE [DISTWIDTH] IN BLOOD BY AUTOMATED COUNT: 13.1 %
EST. GFR  (AFRICAN AMERICAN): >60 ML/MIN/1.73 M^2
EST. GFR  (NON AFRICAN AMERICAN): >60 ML/MIN/1.73 M^2
FIO2: 100
GLUCOSE SERPL-MCNC: 232 MG/DL
HCO3 UR-SCNC: 29.4 MMOL/L (ref 24–28)
HCT VFR BLD AUTO: 39.6 %
HGB BLD-MCNC: 13.5 G/DL
IP: 10
IT: 0.96
LYMPHOCYTES # BLD AUTO: 0.8 K/UL
LYMPHOCYTES NFR BLD: 5.8 %
MCH RBC QN AUTO: 30.8 PG
MCHC RBC AUTO-ENTMCNC: 34.1 %
MCV RBC AUTO: 90 FL
MODE: ABNORMAL
MONOCYTES # BLD AUTO: 1 K/UL
MONOCYTES NFR BLD: 7.6 %
NEUTROPHILS # BLD AUTO: 11.3 K/UL
NEUTROPHILS NFR BLD: 86.6 %
PCO2 BLDA: 49.5 MMHG (ref 35–45)
PEEP: 15
PH SMN: 7.38 [PH] (ref 7.35–7.45)
PLATELET # BLD AUTO: 238 K/UL
PMV BLD AUTO: 9.3 FL
PO2 BLDA: 76 MMHG (ref 80–100)
POC BE: 4 MMOL/L
POC SATURATED O2: 95 % (ref 95–100)
POCT GLUCOSE: 174 MG/DL (ref 70–110)
POCT GLUCOSE: 189 MG/DL (ref 70–110)
POCT GLUCOSE: 213 MG/DL (ref 70–110)
POCT GLUCOSE: 223 MG/DL (ref 70–110)
POCT GLUCOSE: 231 MG/DL (ref 70–110)
POTASSIUM SERPL-SCNC: 4.8 MMOL/L
PROT SERPL-MCNC: 6.9 G/DL
RBC # BLD AUTO: 4.38 M/UL
SAMPLE: ABNORMAL
SITE: ABNORMAL
SODIUM SERPL-SCNC: 138 MMOL/L
TRIGL SERPL-MCNC: 323 MG/DL
WBC # BLD AUTO: 13.01 K/UL

## 2017-02-25 PROCEDURE — 94668 MNPJ CHEST WALL SBSQ: CPT

## 2017-02-25 PROCEDURE — 63600175 PHARM REV CODE 636 W HCPCS: Performed by: NURSE PRACTITIONER

## 2017-02-25 PROCEDURE — 63600175 PHARM REV CODE 636 W HCPCS: Performed by: INTERNAL MEDICINE

## 2017-02-25 PROCEDURE — 94003 VENT MGMT INPAT SUBQ DAY: CPT

## 2017-02-25 PROCEDURE — 25000242 PHARM REV CODE 250 ALT 637 W/ HCPCS: Performed by: INTERNAL MEDICINE

## 2017-02-25 PROCEDURE — 25000003 PHARM REV CODE 250: Performed by: NURSE PRACTITIONER

## 2017-02-25 PROCEDURE — 36600 WITHDRAWAL OF ARTERIAL BLOOD: CPT

## 2017-02-25 PROCEDURE — 99291 CRITICAL CARE FIRST HOUR: CPT | Mod: ,,, | Performed by: NURSE PRACTITIONER

## 2017-02-25 PROCEDURE — 25000003 PHARM REV CODE 250: Performed by: INTERNAL MEDICINE

## 2017-02-25 PROCEDURE — 94667 MNPJ CHEST WALL 1ST: CPT

## 2017-02-25 PROCEDURE — 82803 BLOOD GASES ANY COMBINATION: CPT

## 2017-02-25 PROCEDURE — 36415 COLL VENOUS BLD VENIPUNCTURE: CPT

## 2017-02-25 PROCEDURE — 20000000 HC ICU ROOM

## 2017-02-25 PROCEDURE — 84478 ASSAY OF TRIGLYCERIDES: CPT

## 2017-02-25 PROCEDURE — 94640 AIRWAY INHALATION TREATMENT: CPT

## 2017-02-25 PROCEDURE — 99900026 HC AIRWAY MAINTENANCE (STAT)

## 2017-02-25 PROCEDURE — 85025 COMPLETE CBC W/AUTO DIFF WBC: CPT

## 2017-02-25 PROCEDURE — 80053 COMPREHEN METABOLIC PANEL: CPT

## 2017-02-25 RX ORDER — POLYETHYLENE GLYCOL 3350 17 G/17G
17 POWDER, FOR SOLUTION ORAL DAILY
Status: DISCONTINUED | OUTPATIENT
Start: 2017-02-25 | End: 2017-03-08 | Stop reason: HOSPADM

## 2017-02-25 RX ORDER — FUROSEMIDE 10 MG/ML
40 INJECTION INTRAMUSCULAR; INTRAVENOUS ONCE
Status: COMPLETED | OUTPATIENT
Start: 2017-02-25 | End: 2017-02-25

## 2017-02-25 RX ADMIN — INSULIN ASPART 1 UNITS: 100 INJECTION, SOLUTION INTRAVENOUS; SUBCUTANEOUS at 09:02

## 2017-02-25 RX ADMIN — IPRATROPIUM BROMIDE AND ALBUTEROL SULFATE 3 ML: .5; 3 SOLUTION RESPIRATORY (INHALATION) at 07:02

## 2017-02-25 RX ADMIN — INSULIN ASPART 1 UNITS: 100 INJECTION, SOLUTION INTRAVENOUS; SUBCUTANEOUS at 02:02

## 2017-02-25 RX ADMIN — PIPERACILLIN SODIUM AND TAZOBACTAM SODIUM 4.5 G: 4; .5 INJECTION, POWDER, FOR SOLUTION INTRAVENOUS at 07:02

## 2017-02-25 RX ADMIN — PROPOFOL 40 MCG/KG/MIN: 10 INJECTION, EMULSION INTRAVENOUS at 10:02

## 2017-02-25 RX ADMIN — CHLORHEXIDINE GLUCONATE 15 ML: 1.2 RINSE ORAL at 09:02

## 2017-02-25 RX ADMIN — PROPOFOL 50 MCG/KG/MIN: 10 INJECTION, EMULSION INTRAVENOUS at 11:02

## 2017-02-25 RX ADMIN — PROPOFOL 50 MCG/KG/MIN: 10 INJECTION, EMULSION INTRAVENOUS at 03:02

## 2017-02-25 RX ADMIN — DIPHENHYDRAMINE HYDROCHLORIDE 25 MG: 12.5 SOLUTION ORAL at 03:02

## 2017-02-25 RX ADMIN — ACETYLCYSTEINE 4 ML: 100 SOLUTION ORAL; RESPIRATORY (INHALATION) at 07:02

## 2017-02-25 RX ADMIN — DIPHENHYDRAMINE HYDROCHLORIDE 25 MG: 12.5 SOLUTION ORAL at 05:02

## 2017-02-25 RX ADMIN — PROPOFOL 50 MCG/KG/MIN: 10 INJECTION, EMULSION INTRAVENOUS at 05:02

## 2017-02-25 RX ADMIN — METHYLPREDNISOLONE SODIUM SUCCINATE 60 MG: 125 INJECTION, POWDER, FOR SOLUTION INTRAMUSCULAR; INTRAVENOUS at 05:02

## 2017-02-25 RX ADMIN — INSULIN ASPART 4 UNITS: 100 INJECTION, SOLUTION INTRAVENOUS; SUBCUTANEOUS at 05:02

## 2017-02-25 RX ADMIN — FAMOTIDINE 20 MG: 20 INJECTION, SOLUTION INTRAVENOUS at 09:02

## 2017-02-25 RX ADMIN — PROPOFOL 50 MCG/KG/MIN: 10 INJECTION, EMULSION INTRAVENOUS at 07:02

## 2017-02-25 RX ADMIN — PIPERACILLIN SODIUM AND TAZOBACTAM SODIUM 4.5 G: 4; .5 INJECTION, POWDER, FOR SOLUTION INTRAVENOUS at 12:02

## 2017-02-25 RX ADMIN — DIPHENHYDRAMINE HYDROCHLORIDE 25 MG: 12.5 SOLUTION ORAL at 09:02

## 2017-02-25 RX ADMIN — POLYETHYLENE GLYCOL 3350 17 G: 17 POWDER, FOR SOLUTION ORAL at 11:02

## 2017-02-25 RX ADMIN — PROPOFOL 40 MCG/KG/MIN: 10 INJECTION, EMULSION INTRAVENOUS at 08:02

## 2017-02-25 RX ADMIN — PIPERACILLIN SODIUM AND TAZOBACTAM SODIUM 4.5 G: 4; .5 INJECTION, POWDER, FOR SOLUTION INTRAVENOUS at 05:02

## 2017-02-25 RX ADMIN — FAMOTIDINE 20 MG: 20 INJECTION, SOLUTION INTRAVENOUS at 10:02

## 2017-02-25 RX ADMIN — METHYLPREDNISOLONE SODIUM SUCCINATE 60 MG: 125 INJECTION, POWDER, FOR SOLUTION INTRAMUSCULAR; INTRAVENOUS at 06:02

## 2017-02-25 RX ADMIN — PROPOFOL 40 MCG/KG/MIN: 10 INJECTION, EMULSION INTRAVENOUS at 06:02

## 2017-02-25 RX ADMIN — IPRATROPIUM BROMIDE AND ALBUTEROL SULFATE 3 ML: .5; 3 SOLUTION RESPIRATORY (INHALATION) at 12:02

## 2017-02-25 RX ADMIN — ENOXAPARIN SODIUM 40 MG: 100 INJECTION SUBCUTANEOUS at 11:02

## 2017-02-25 RX ADMIN — PROPOFOL 45 MCG/KG/MIN: 10 INJECTION, EMULSION INTRAVENOUS at 03:02

## 2017-02-25 RX ADMIN — PROPOFOL 45 MCG/KG/MIN: 10 INJECTION, EMULSION INTRAVENOUS at 01:02

## 2017-02-25 RX ADMIN — IPRATROPIUM BROMIDE AND ALBUTEROL SULFATE 3 ML: .5; 3 SOLUTION RESPIRATORY (INHALATION) at 01:02

## 2017-02-25 RX ADMIN — PROPOFOL 50 MCG/KG/MIN: 10 INJECTION, EMULSION INTRAVENOUS at 01:02

## 2017-02-25 RX ADMIN — FUROSEMIDE 40 MG: 10 INJECTION, SOLUTION INTRAMUSCULAR; INTRAVENOUS at 07:02

## 2017-02-25 RX ADMIN — METHYLPREDNISOLONE SODIUM SUCCINATE 60 MG: 125 INJECTION, POWDER, FOR SOLUTION INTRAMUSCULAR; INTRAVENOUS at 11:02

## 2017-02-25 RX ADMIN — PROPOFOL 50 MCG/KG/MIN: 10 INJECTION, EMULSION INTRAVENOUS at 09:02

## 2017-02-25 RX ADMIN — INSULIN ASPART 4 UNITS: 100 INJECTION, SOLUTION INTRAVENOUS; SUBCUTANEOUS at 06:02

## 2017-02-25 NOTE — ASSESSMENT & PLAN NOTE
Associated with Bactrim initiation.  He received Steroids and anti-histamines.  No epinephrine.  Intubated for airway protection.

## 2017-02-25 NOTE — PROGRESS NOTES
Progress Note  Critical Care    Admit Date: 2/22/2017   LOS: 3 days     Follow-up For: Resp Failure     SUBJECTIVE:     New over last 24 hours: Still very high O2 demand.  Unable to wean any sedation due to severe agitation and restlessness.  Devi TF.     ROS  Review of Systems   Unable to perform ROS: Intubated       Continuous Infusions:   fentanyl 7.5 mL/hr at 02/25/17 0605    propofol 50 mcg/kg/min (02/25/17 0702)     Review of patient's allergies indicates:   Allergen Reactions    Bactrim [sulfamethoxazole-trimethoprim] Swelling    Rifamycin analogues        OBJECTIVE:     Vital Signs (Most Recent)  Temp: 99.8 °F (37.7 °C) (02/25/17 0715)  Pulse: 89 (02/25/17 0934)  Resp: 12 (02/25/17 0934)  BP: (!) 122/49 (02/25/17 0933)  SpO2: (!) 93 % (02/25/17 0934)    Vital Signs Range (Last 24H):  Temp:  [99.1 °F (37.3 °C)-99.8 °F (37.7 °C)]   Pulse:  []   Resp:  [12-29]   BP: (120-186)/()   SpO2:  [86 %-95 %]     I & O (Last 24H):  Intake/Output Summary (Last 24 hours) at 02/25/17 1022  Last data filed at 02/25/17 0605   Gross per 24 hour   Intake          2115.09 ml   Output             1070 ml   Net          1045.09 ml       Ventilator Data (Last 24H):     Vent Mode: A/C  Oxygen Concentration (%):  [] 100  Resp Rate Total:  [15 br/min-29 br/min] 17 br/min  Vt Set:  [0 mL-500 mL] 0 mL  PEEP/CPAP:  [12 cmH20-15 cmH20] 15 cmH20  Pressure Support:  [0 cmH20] 0 cmH20  Mean Airway Pressure:  [15 osI41-85 cmH20] 19 cmH20    Physical Exam:    Physical Exam   Constitutional: He is well-developed, well-nourished, and in no distress. Vital signs are normal. He appears lethargic (sedated). He appears to not be writhing in pain, not malnourished and not dehydrated. He appears unhealthy. He has a sickly appearance. No distress. He is intubated.   HENT:   Head: Normocephalic and atraumatic.   Mouth/Throat: Oropharynx is clear and moist.   Eyes: Lids are normal. Pupils are equal, round, and reactive to light.    Neck: Neck supple. Carotid bruit is not present.   Obese   Cardiovascular: Normal rate and regular rhythm.    Pulses:       Radial pulses are 2+ on the right side, and 2+ on the left side.        Dorsalis pedis pulses are 1+ on the right side, and 1+ on the left side.   Pulmonary/Chest: No accessory muscle usage. He is intubated. No respiratory distress. He has decreased breath sounds in the right lower field and the left lower field.   Abdominal: Soft. Bowel sounds are normal. He exhibits no distension. There is no tenderness.   obese   Genitourinary: Penis normal.   Genitourinary Comments: Mcgill    Musculoskeletal: Normal range of motion.   +1 edema   Lymphadenopathy:     He has no cervical adenopathy.   Neurological: He appears lethargic (sedated).   sedated   Skin: Skin is warm, dry and intact. He is not diaphoretic. No cyanosis.       Laboratory (Last 24H):  CBC:    Recent Labs  Lab 02/25/17  0354   WBC 13.01*   HGB 13.5*   HCT 39.6*        CMP:    Recent Labs  Lab 02/25/17  0354   CALCIUM 9.1   ALBUMIN 3.3*   PROT 6.9      K 4.8   CO2 28      BUN 23*   CREATININE 1.1   ALKPHOS 43*   ALT 40   AST 33   BILITOT 0.4       ABGs:   Recent Labs  Lab 02/25/17  0433   PH 7.381   PCO2 49.5*   HCO3 29.4*   POCSATURATED 95   BE 4     Microbiology Results (last 7 days)     Procedure Component Value Units Date/Time    Blood Culture #1 **CANNOT BE ORDERED STAT** [[id number]] Collected:  02/22/17 1030    Order Status:  Completed Specimen:  Blood from Peripheral, Hand, Right Updated:  02/24/17 2212     Blood Culture, Routine No Growth to date     Blood Culture, Routine No Growth to date     Blood Culture, Routine No Growth to date    Blood Culture #2 **CANNOT BE ORDERED STAT** [[id number]] Collected:  02/22/17 1045    Order Status:  Completed Specimen:  Blood from Peripheral, Hand, Right Updated:  02/24/17 2212     Blood Culture, Routine No Growth to date     Blood Culture, Routine No Growth to date      Blood Culture, Routine No Growth to date    Aerobic culture [[id number]] Collected:  02/24/17 0933    Order Status:  Sent Specimen:  Wound from Arm, Right Updated:  02/24/17 0933    Narrative:       Right axilla    Culture, Respiratory with Gram Stain [[id number]] Collected:  02/23/17 1647    Order Status:  Completed Specimen:  Respiratory from Sputum, Induced Updated:  02/24/17 0551     Gram Stain (Respiratory) >10 epithelial cells per low power field     Gram Stain (Respiratory) Many WBC's     Gram Stain (Respiratory) Many Gram negative rods     Gram Stain (Respiratory) Few Gram positive cocci     Gram Stain (Respiratory) Rare Gram negative diplococci    Narrative:       Endotracheal tube suction          Chest X-Ray:  Film and report reviewed:  Persistent bibasal infiltrates      ASSESSMENT/PLAN:     Problem   Acute Respiratory Failure With Hypoxia   Pneumonia Due to Infectious Organism   Hyperglycemia, Drug-Induced   Allergic Reaction   Anaphylaxis   Abscess of Axilla, Right       PLAN:    1. Neuro: ICU neuro monitoring.  Very agitated with any sedation weaning    2. Pulmonary: Vent settings reviewed and adjusted.  CTA chest yest no PE but + bibasal infiltrates.  Cont Duoneb, MM nebs, steroids and IVAB.  Wean FiO2 as tolerated.  Daily CXR    3. Cardiac: ICU Cardiac monitoring.  Lasix X 1.     4. Renal: Mcgill in place, monitor I/Os     5. Infectious Disease: Follow fever curve.  Sputum + GNR with ID&S pending.  Blood cultures NGTD.  Axilla abscess healing and no drainage.  Cont Zosyn    6. Hematology/Oncology: monitor for bleeding    7. Endocrine:  Monitor glucose and cont SSI.  Wean steroids    8. Fluids/Electrolytes/Nutrition/GI: tolerating TF and add Miralax    9. Musculoskeletal:  ROM    10. Pain Management: Fentanyl infusion    11. Discharge and Palliative Care: Plan home with family    Preventive Measures and Monitoring:   Stress Ulcer: Pepcid  Nutrition: TF  Glucose control: SSI  Bowel prophylaxis: prn  Dulcolax  DVT prophylaxis: LMWH/SCDs  Hx CAD on B-Blocker: no hx CAD  Head of Bed/Reposition: Elevate HOB and turn Q1-2 hours    Early Mobility: OOB once off sedation  SAT/SBT: daily once O2 demand improves  RASS goal: -2  Vent Day: #4  OG Day: #4  Mcgill Day: #4  IVAB Day: #4  Code Status: Full    Counseling/Consultation: I have discussed the care of this patient in detail with Multidisciplinary team during rounds, bedside nursing staff and Dr. Banks and Dr. Yu    Critical Care Time greater than: 62 minutes    Critical care was time spent personally by me on the following activities: development of treatment plan with patient or surrogate and bedside caregivers, discussions with consultants, evaluation of patient's response to treatment, examination of patient, ordering and performing treatments and interventions, ordering and review of laboratory studies, ordering and review of radiographic studies, pulse oximetry, re-evaluation of patient's condition.  This critical care time did not overlap with that of any other provider or involve time for any procedures.    SWAPNA Reyes [facility] Center[facility]-BC  [facility]

## 2017-02-25 NOTE — ASSESSMENT & PLAN NOTE
Increasing difficulty with oxygenation requiring 100% FiO2 and increasing PEEP.  CT chest negative for embolism but segmental consolidation bilaterally in the bases worse on the right.  Receiving chest physiotherapy with nebulizer treatments for possible mucus plugging.

## 2017-02-25 NOTE — ASSESSMENT & PLAN NOTE
CXR and CTA suggest lower lobe consolidation/atelectasis.  Patient started on Zosyn and Clindamycin.  Clindamycin will cover axillary abscess and MRSA.

## 2017-02-25 NOTE — PLAN OF CARE
Problem: Patient Care Overview  Goal: Plan of Care Review  Outcome: Ongoing (interventions implemented as appropriate)  Patient rested in between care. Fentanyl at 75mcg / hr and Propofol at 50 mcg/kg/hr to achieve RASS of -2. NSR / Sinus tach on telemetry monitor during the night. Hemodynamically stable without vasoactive support. O2 sats via pulse oximetry >92 % on current vent settings. Breath sounds diminished bilat. Urine output averaging 60 mL / hr. Tube feeding currently at 30 mL/hr and tolerating well with minimal residual. Patient turned and repositioned q2 hours with use of wedge. Patient will need speciality bed today. Bilat soft wrist restraints remain in place for patient safety and to prevent treatment interference. Spouse updated to plan of care and no further questions at this time.

## 2017-02-25 NOTE — PROGRESS NOTES
Notified MD Banks of pt O2 sats via pulse oximetry averaging 90 this shift on current vent settings (fiO2 100% and 15 PEEP). Okay with maintaining sats >88%. Will continue to monitor.

## 2017-02-25 NOTE — PLAN OF CARE
Problem: Patient Care Overview  Goal: Plan of Care Review  Outcome: Ongoing (interventions implemented as appropriate)  Patient remain on vent support. Neb txs tolerated. Will continue to monitor. RT to follow.

## 2017-02-25 NOTE — SUBJECTIVE & OBJECTIVE
Interval History:  Increasingly difficult to oxygenate on ventilator.  Increased oxygen to 100% and PEEP now 15.    Review of Systems   Unable to perform ROS: Intubated     Objective:     Vital Signs (Most Recent):  Temp: 99.8 °F (37.7 °C) (02/25/17 0715)  Pulse: 87 (02/25/17 0830)  Resp: 13 (02/25/17 0830)  BP: (!) 145/57 (02/25/17 0830)  SpO2: (!) 94 % (02/25/17 0830) Vital Signs (24h Range):  Temp:  [99.1 °F (37.3 °C)-99.8 °F (37.7 °C)] 99.8 °F (37.7 °C)  Pulse:  [] 87  Resp:  [12-29] 13  SpO2:  [86 %-95 %] 94 %  BP: (120-186)/() 145/57     Weight: (!) 173 kg (381 lb 6.3 oz)  Body mass index is 50.32 kg/(m^2).    Intake/Output Summary (Last 24 hours) at 02/25/17 0909  Last data filed at 02/25/17 0605   Gross per 24 hour   Intake          2215.09 ml   Output             1170 ml   Net          1045.09 ml      Physical Exam   Constitutional: He appears well-developed and well-nourished. No distress.   HENT:   Head: Normocephalic and atraumatic.   Mouth/Throat: Oropharynx is clear and moist.   Eyes: Conjunctivae and EOM are normal. Pupils are equal, round, and reactive to light.   Neck: No JVD present. No thyromegaly present.   Cardiovascular: Normal rate, regular rhythm and normal heart sounds.  Exam reveals no gallop and no friction rub.    No murmur heard.  Pulmonary/Chest: Effort normal and breath sounds normal. No respiratory distress. He has no wheezes. He has no rales.   Abdominal: Soft. Bowel sounds are normal. He exhibits no distension. There is no tenderness. There is no rebound and no guarding.   Musculoskeletal: Normal range of motion. He exhibits no edema or tenderness.   Lymphadenopathy:     He has no cervical adenopathy.   Neurological: He has normal reflexes. He displays normal reflexes. No cranial nerve deficit.   Intubated and sedated   Skin: Skin is warm and dry. No rash noted. He is not diaphoretic. No erythema.   Small left anterior axillary wound.  Dressed clean and intact.   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       Significant Labs:   CBC:   Recent Labs  Lab 02/24/17  0330 02/25/17  0354   WBC 15.67* 13.01*   HGB 15.4 13.5*   HCT 45.3 39.6*    238     CMP:   Recent Labs  Lab 02/24/17  0330 02/25/17  0354    138   K 4.7 4.8    101   CO2 25 28   * 232*   BUN 18 23*   CREATININE 1.0 1.1   CALCIUM 9.5 9.1   PROT 7.7 6.9   ALBUMIN 3.7 3.3*   BILITOT 0.5 0.4   ALKPHOS 51* 43*   AST 40 33   ALT 53* 40   ANIONGAP 11 9   EGFRNONAA >60 >60       Significant Imaging: I have reviewed all pertinent imaging results/findings within the past 24 hours.

## 2017-02-25 NOTE — PLAN OF CARE
Problem: Patient Care Overview  Goal: Plan of Care Review  Outcome: Ongoing (interventions implemented as appropriate)  Patient remains on ventilator at this time. ETT 8.0 remains secure at 24 cm at the lips. Patient does have bleeding from bottom lip, nursing aware. Patient tolerated treatment well. Respiratory to follow.

## 2017-02-25 NOTE — PROGRESS NOTES
[facility] Hospital Medicine  Progress Note    Patient Name: Matty Nunn  MRN: [id number]  Patient Class: IP- Inpatient   Admission Date: 2/22/2017  Length of Stay: 3 days  Attending Physician: Victorino Yu MD  Primary Care Provider: Ruddy Noble MD        Subjective:     Principal Problem:Anaphylaxis    HPI:  Patient is a 37 yo male with a hx of gout presented to [facility] on 2/21/17 with a right axillary abscess which was I&D'd in the ER.  Patient was sent home on Bactrim.  He return to the ER today for increase difficulty breathing.  He was intubated in the ER, but self extubated himself.  He was then re intubated and sent to [facility] for further ICU care.      All history taken from records as pt has been intubated and sedated since prior to arrival.  Case d/w Dr. Mckinney ([facility] ER attending) who states all hx was taken from the pt prior to intubation.  Patient has a notable breath ceci on his left leg.      Hospital Course:       Interval History:  Increasingly difficult to oxygenate on ventilator.  Increased oxygen to 100% and PEEP now 15.    Review of Systems   Unable to perform ROS: Intubated     Objective:     Vital Signs (Most Recent):  Temp: 99.8 °F (37.7 °C) (02/25/17 0715)  Pulse: 87 (02/25/17 0830)  Resp: 13 (02/25/17 0830)  BP: (!) 145/57 (02/25/17 0830)  SpO2: (!) 94 % (02/25/17 0830) Vital Signs (24h Range):  Temp:  [99.1 °F (37.3 °C)-99.8 °F (37.7 °C)] 99.8 °F (37.7 °C)  Pulse:  [] 87  Resp:  [12-29] 13  SpO2:  [86 %-95 %] 94 %  BP: (120-186)/() 145/57     Weight: (!) 173 kg (381 lb 6.3 oz)  Body mass index is 50.32 kg/(m^2).    Intake/Output Summary (Last 24 hours) at 02/25/17 0909  Last data filed at 02/25/17 0605   Gross per 24 hour   Intake          2215.09 ml   Output             1170 ml   Net          1045.09 ml      Physical Exam   Constitutional: He appears well-developed and well-nourished. No distress.   HENT:   Head: Normocephalic  and atraumatic.   Mouth/Throat: Oropharynx is clear and moist.   Eyes: Conjunctivae and EOM are normal. Pupils are equal, round, and reactive to light.   Neck: No JVD present. No thyromegaly present.   Cardiovascular: Normal rate, regular rhythm and normal heart sounds.  Exam reveals no gallop and no friction rub.    No murmur heard.  Pulmonary/Chest: Effort normal and breath sounds normal. No respiratory distress. He has no wheezes. He has no rales.   Abdominal: Soft. Bowel sounds are normal. He exhibits no distension. There is no tenderness. There is no rebound and no guarding.   Musculoskeletal: Normal range of motion. He exhibits no edema or tenderness.   Lymphadenopathy:     He has no cervical adenopathy.   Neurological: He has normal reflexes. He displays normal reflexes. No cranial nerve deficit.   Intubated and sedated   Skin: Skin is warm and dry. No rash noted. He is not diaphoretic. No erythema.   Small left anterior axillary wound.  Dressed clean and intact.  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       Significant Labs:   CBC:   Recent Labs  Lab 02/24/17  0330 02/25/17  0354   WBC 15.67* 13.01*   HGB 15.4 13.5*   HCT 45.3 39.6*    238     CMP:   Recent Labs  Lab 02/24/17  0330 02/25/17  0354    138   K 4.7 4.8    101   CO2 25 28   * 232*   BUN 18 23*   CREATININE 1.0 1.1   CALCIUM 9.5 9.1   PROT 7.7 6.9   ALBUMIN 3.7 3.3*   BILITOT 0.5 0.4   ALKPHOS 51* 43*   AST 40 33   ALT 53* 40   ANIONGAP 11 9   EGFRNONAA >60 >60       Significant Imaging: I have reviewed all pertinent imaging results/findings within the past 24 hours.    Assessment/Plan:      * Anaphylaxis  Associated with staring Bactrim and presumed allergic response.  Intubated for airway protection.    Allergic reaction  Associated with Bactrim initiation.  He received Steroids and anti-histamines.  No epinephrine.  Intubated for airway protection.    Abscess of axilla, right  Initially prescribed Bactrim now on  Clindamycin.    Acute respiratory failure with hypoxia  Increasing difficulty with oxygenation requiring 100% FiO2 and increasing PEEP.  CT chest negative for embolism but segmental consolidation bilaterally in the bases worse on the right.  Receiving chest physiotherapy with nebulizer treatments for possible mucus plugging.      Pneumonia due to infectious organism  CXR and CTA suggest lower lobe consolidation/atelectasis.  Patient started on Zosyn and Clindamycin.  Clindamycin will cover axillary abscess and MRSA.    VTE Risk Mitigation         Ordered     enoxaparin injection 40 mg  Daily     Route:  Subcutaneous        02/22/17 2146     Medium Risk of VTE  Once      02/22/17 1802     Place GETACHEW hose  Until discontinued      02/22/17 1802     Place sequential compression device  Until discontinued      02/22/17 1802          Victorino Yu MD  Department of Hospital Medicine   [facility]    Critical care time:  30 minutes.

## 2017-02-26 LAB
ALBUMIN SERPL BCP-MCNC: 3.2 G/DL
ALLENS TEST: ABNORMAL
ALP SERPL-CCNC: 38 U/L
ALT SERPL W/O P-5'-P-CCNC: 71 U/L
ANION GAP SERPL CALC-SCNC: 8 MMOL/L
AST SERPL-CCNC: 40 U/L
BASOPHILS # BLD AUTO: 0.01 K/UL
BASOPHILS NFR BLD: 0.1 %
BILIRUB SERPL-MCNC: 0.6 MG/DL
BUN SERPL-MCNC: 33 MG/DL
CALCIUM SERPL-MCNC: 9.1 MG/DL
CHLORIDE SERPL-SCNC: 99 MMOL/L
CO2 SERPL-SCNC: 31 MMOL/L
CREAT SERPL-MCNC: 0.9 MG/DL
DELSYS: ABNORMAL
DIFFERENTIAL METHOD: ABNORMAL
EOSINOPHIL # BLD AUTO: 0 K/UL
EOSINOPHIL NFR BLD: 0 %
ERYTHROCYTE [DISTWIDTH] IN BLOOD BY AUTOMATED COUNT: 13 %
EST. GFR  (AFRICAN AMERICAN): >60 ML/MIN/1.73 M^2
EST. GFR  (NON AFRICAN AMERICAN): >60 ML/MIN/1.73 M^2
FIO2: 70
GLUCOSE SERPL-MCNC: 288 MG/DL
HCO3 UR-SCNC: 35 MMOL/L (ref 24–28)
HCT VFR BLD AUTO: 38.9 %
HGB BLD-MCNC: 13.3 G/DL
IP: 10
IT: 0.96
LYMPHOCYTES # BLD AUTO: 1 K/UL
LYMPHOCYTES NFR BLD: 9.1 %
MAGNESIUM SERPL-MCNC: 2.8 MG/DL
MCH RBC QN AUTO: 30.8 PG
MCHC RBC AUTO-ENTMCNC: 34.2 %
MCV RBC AUTO: 90 FL
MODE: ABNORMAL
MONOCYTES # BLD AUTO: 0.8 K/UL
MONOCYTES NFR BLD: 8 %
NEUTROPHILS # BLD AUTO: 8.7 K/UL
NEUTROPHILS NFR BLD: 82.8 %
PCO2 BLDA: 54.2 MMHG (ref 35–45)
PEEP: 15
PH SMN: 7.42 [PH] (ref 7.35–7.45)
PLATELET # BLD AUTO: 227 K/UL
PMV BLD AUTO: 9.2 FL
PO2 BLDA: 64 MMHG (ref 80–100)
POC BE: 10 MMOL/L
POC SATURATED O2: 92 % (ref 95–100)
POCT GLUCOSE: 218 MG/DL (ref 70–110)
POCT GLUCOSE: 220 MG/DL (ref 70–110)
POCT GLUCOSE: 224 MG/DL (ref 70–110)
POCT GLUCOSE: 235 MG/DL (ref 70–110)
POCT GLUCOSE: 237 MG/DL (ref 70–110)
POTASSIUM SERPL-SCNC: 4.4 MMOL/L
PROT SERPL-MCNC: 6.8 G/DL
RBC # BLD AUTO: 4.32 M/UL
SAMPLE: ABNORMAL
SITE: ABNORMAL
SODIUM SERPL-SCNC: 138 MMOL/L
WBC # BLD AUTO: 10.52 K/UL

## 2017-02-26 PROCEDURE — 83735 ASSAY OF MAGNESIUM: CPT

## 2017-02-26 PROCEDURE — 99291 CRITICAL CARE FIRST HOUR: CPT | Mod: ,,, | Performed by: NURSE PRACTITIONER

## 2017-02-26 PROCEDURE — 85025 COMPLETE CBC W/AUTO DIFF WBC: CPT

## 2017-02-26 PROCEDURE — 25000003 PHARM REV CODE 250: Performed by: NURSE PRACTITIONER

## 2017-02-26 PROCEDURE — 94003 VENT MGMT INPAT SUBQ DAY: CPT

## 2017-02-26 PROCEDURE — 63600175 PHARM REV CODE 636 W HCPCS: Performed by: INTERNAL MEDICINE

## 2017-02-26 PROCEDURE — 80053 COMPREHEN METABOLIC PANEL: CPT

## 2017-02-26 PROCEDURE — 25000003 PHARM REV CODE 250: Performed by: INTERNAL MEDICINE

## 2017-02-26 PROCEDURE — 94640 AIRWAY INHALATION TREATMENT: CPT

## 2017-02-26 PROCEDURE — 25000242 PHARM REV CODE 250 ALT 637 W/ HCPCS: Performed by: INTERNAL MEDICINE

## 2017-02-26 PROCEDURE — 36415 COLL VENOUS BLD VENIPUNCTURE: CPT

## 2017-02-26 PROCEDURE — 94668 MNPJ CHEST WALL SBSQ: CPT

## 2017-02-26 PROCEDURE — 82803 BLOOD GASES ANY COMBINATION: CPT

## 2017-02-26 PROCEDURE — 36600 WITHDRAWAL OF ARTERIAL BLOOD: CPT

## 2017-02-26 PROCEDURE — 63600175 PHARM REV CODE 636 W HCPCS: Performed by: NURSE PRACTITIONER

## 2017-02-26 PROCEDURE — 20000000 HC ICU ROOM

## 2017-02-26 RX ORDER — METHYLPREDNISOLONE SOD SUCC 125 MG
60 VIAL (EA) INJECTION EVERY 12 HOURS
Status: DISCONTINUED | OUTPATIENT
Start: 2017-02-26 | End: 2017-02-28

## 2017-02-26 RX ADMIN — PIPERACILLIN SODIUM AND TAZOBACTAM SODIUM 4.5 G: 4; .5 INJECTION, POWDER, FOR SOLUTION INTRAVENOUS at 11:02

## 2017-02-26 RX ADMIN — METHYLPREDNISOLONE SODIUM SUCCINATE 60 MG: 125 INJECTION, POWDER, FOR SOLUTION INTRAMUSCULAR; INTRAVENOUS at 05:02

## 2017-02-26 RX ADMIN — POLYETHYLENE GLYCOL 3350 17 G: 17 POWDER, FOR SOLUTION ORAL at 09:02

## 2017-02-26 RX ADMIN — PROPOFOL 50 MCG/KG/MIN: 10 INJECTION, EMULSION INTRAVENOUS at 07:02

## 2017-02-26 RX ADMIN — CHLORHEXIDINE GLUCONATE 15 ML: 1.2 RINSE ORAL at 09:02

## 2017-02-26 RX ADMIN — INSULIN ASPART 2 UNITS: 100 INJECTION, SOLUTION INTRAVENOUS; SUBCUTANEOUS at 09:02

## 2017-02-26 RX ADMIN — IPRATROPIUM BROMIDE AND ALBUTEROL SULFATE 3 ML: .5; 3 SOLUTION RESPIRATORY (INHALATION) at 07:02

## 2017-02-26 RX ADMIN — PROPOFOL 40 MCG/KG/MIN: 10 INJECTION, EMULSION INTRAVENOUS at 05:02

## 2017-02-26 RX ADMIN — IPRATROPIUM BROMIDE AND ALBUTEROL SULFATE 3 ML: .5; 3 SOLUTION RESPIRATORY (INHALATION) at 01:02

## 2017-02-26 RX ADMIN — DIPHENHYDRAMINE HYDROCHLORIDE 25 MG: 12.5 SOLUTION ORAL at 05:02

## 2017-02-26 RX ADMIN — PROPOFOL 40 MCG/KG/MIN: 10 INJECTION, EMULSION INTRAVENOUS at 09:02

## 2017-02-26 RX ADMIN — PROPOFOL 40 MCG/KG/MIN: 10 INJECTION, EMULSION INTRAVENOUS at 12:02

## 2017-02-26 RX ADMIN — INSULIN ASPART 4 UNITS: 100 INJECTION, SOLUTION INTRAVENOUS; SUBCUTANEOUS at 05:02

## 2017-02-26 RX ADMIN — FAMOTIDINE 20 MG: 20 INJECTION, SOLUTION INTRAVENOUS at 09:02

## 2017-02-26 RX ADMIN — METHYLPREDNISOLONE SODIUM SUCCINATE 60 MG: 125 INJECTION, POWDER, FOR SOLUTION INTRAMUSCULAR; INTRAVENOUS at 12:02

## 2017-02-26 RX ADMIN — PROPOFOL 40 MCG/KG/MIN: 10 INJECTION, EMULSION INTRAVENOUS at 03:02

## 2017-02-26 RX ADMIN — PIPERACILLIN SODIUM AND TAZOBACTAM SODIUM 4.5 G: 4; .5 INJECTION, POWDER, FOR SOLUTION INTRAVENOUS at 12:02

## 2017-02-26 RX ADMIN — Medication 75 MCG/HR: at 12:02

## 2017-02-26 RX ADMIN — ACETYLCYSTEINE 4 ML: 100 SOLUTION ORAL; RESPIRATORY (INHALATION) at 07:02

## 2017-02-26 RX ADMIN — PIPERACILLIN SODIUM AND TAZOBACTAM SODIUM 4.5 G: 4; .5 INJECTION, POWDER, FOR SOLUTION INTRAVENOUS at 08:02

## 2017-02-26 RX ADMIN — DIPHENHYDRAMINE HYDROCHLORIDE 25 MG: 12.5 SOLUTION ORAL at 09:02

## 2017-02-26 RX ADMIN — INSULIN ASPART 2 UNITS: 100 INJECTION, SOLUTION INTRAVENOUS; SUBCUTANEOUS at 01:02

## 2017-02-26 RX ADMIN — IPRATROPIUM BROMIDE AND ALBUTEROL SULFATE 3 ML: .5; 3 SOLUTION RESPIRATORY (INHALATION) at 12:02

## 2017-02-26 RX ADMIN — PIPERACILLIN SODIUM AND TAZOBACTAM SODIUM 4.5 G: 4; .5 INJECTION, POWDER, FOR SOLUTION INTRAVENOUS at 05:02

## 2017-02-26 RX ADMIN — PROPOFOL 50 MCG/KG/MIN: 10 INJECTION, EMULSION INTRAVENOUS at 11:02

## 2017-02-26 RX ADMIN — DIPHENHYDRAMINE HYDROCHLORIDE 25 MG: 12.5 SOLUTION ORAL at 01:02

## 2017-02-26 RX ADMIN — INSULIN ASPART 6 UNITS: 100 INJECTION, SOLUTION INTRAVENOUS; SUBCUTANEOUS at 12:02

## 2017-02-26 RX ADMIN — PROPOFOL 40 MCG/KG/MIN: 10 INJECTION, EMULSION INTRAVENOUS at 10:02

## 2017-02-26 RX ADMIN — PROPOFOL 40 MCG/KG/MIN: 10 INJECTION, EMULSION INTRAVENOUS at 02:02

## 2017-02-26 RX ADMIN — ENOXAPARIN SODIUM 40 MG: 100 INJECTION SUBCUTANEOUS at 01:02

## 2017-02-26 RX ADMIN — PROPOFOL 40 MCG/KG/MIN: 10 INJECTION, EMULSION INTRAVENOUS at 06:02

## 2017-02-26 RX ADMIN — PROPOFOL 50 MCG/KG/MIN: 10 INJECTION, EMULSION INTRAVENOUS at 09:02

## 2017-02-26 RX ADMIN — PROPOFOL 40 MCG/KG/MIN: 10 INJECTION, EMULSION INTRAVENOUS at 08:02

## 2017-02-26 NOTE — PROGRESS NOTES
No changes to vent at this time.  ABG stable.    Soft wrist restraints renewed to prevent self injury. On vent pulling lines.

## 2017-02-26 NOTE — PLAN OF CARE
Problem: Restraint, Nonbehavioral (nonviolent)  Goal: Preservation of Dignity/Wellbeing  Outcome: Ongoing (interventions implemented as appropriate)  Patient rested in between care. Tmax for last 24 hours 99.8 F.  Fentanyl at 75mcg / hr and Propofol at 40 mcg/kg/hr to achieve RASS of -2.   NSR tonight so far on telemetry monitor. Hemodynamically stable without vasoactive support.  O2 sats via pulse oximetry >95 % on current vent settings for most of the night. Pt tolerating weaning of fiO2, 70% currently with a 95% sat via pulse oximetry. Urine output 750mL for this shift so far and current fluid balance -1179 for past 24 hours. Tube feeding currently at goal rate of 60 mL/hr and tolerating well with minimal residual. Patient turned and repositioned q2 hours with use of wedge. Speciality bed arrived tonight, but will await till AM for more staff support to transport pt to new bed considering pt increase agitation with excessive movement. Bilat soft wrist restraints remain in place for patient safety and to prevent treatment interference. Spouse updated to plan of care and no further questions at this time.

## 2017-02-26 NOTE — PLAN OF CARE
Problem: Patient Care Overview  Goal: Plan of Care Review  Outcome: Ongoing (interventions implemented as appropriate)  Patient remains on ventilator at this time. ETT 8.0 remains secure at 24 cm at the lips. No redness or breakdown noted around ETT or commercial tube hernández. Patient does have a small cut on right bottom lip, nursing aware. Patient tolerated treatment and CPT well. Respiratory to follow

## 2017-02-26 NOTE — PROGRESS NOTES
Progress Note  Critical Care    Admit Date: 2/22/2017   LOS: 4 days     Follow-up For: Resp Failure and PNA     SUBJECTIVE:   Change over last 24 hours: Improved O2 demand down from 100% to 60 % FiO2 last 24 hours.  Devi TF.    ROS:  Review of Systems   Unable to perform ROS: Intubated       Continuous Infusions:   fentanyl 7.5 mL/hr at 02/26/17 1000    propofol 40 mcg/kg/min (02/26/17 1008)     Review of patient's allergies indicates:   Allergen Reactions    Bactrim [sulfamethoxazole-trimethoprim] Swelling    Rifamycin analogues        OBJECTIVE:     Vital Signs (Most Recent)  Temp: 98.9 °F (37.2 °C) (02/26/17 0715)  Pulse: 76 (02/26/17 1030)  Resp: 17 (02/26/17 1030)  BP: (!) 122/59 (02/26/17 1030)  SpO2: (!) 94 % (02/26/17 1030)    Vital Signs Range (Last 24H):  Temp:  [98.2 °F (36.8 °C)-98.9 °F (37.2 °C)]   Pulse:  [65-91]   Resp:  [10-22]   BP: ()/(45-72)   SpO2:  [93 %-99 %]     I & O (Last 24H):  Intake/Output Summary (Last 24 hours) at 02/26/17 1106  Last data filed at 02/26/17 1008   Gross per 24 hour   Intake          2699.98 ml   Output             3160 ml   Net          -460.02 ml       Ventilator Data (Last 24H):     Vent Mode: A/C  Oxygen Concentration (%):  [] 60  Resp Rate Total:  [15 br/min-23 br/min] 17 br/min  Vt Set:  [0 mL] 0 mL  PEEP/CPAP:  [15 cmH20] 15 cmH20  Pressure Support:  [0 cmH20] 0 cmH20  Mean Airway Pressure:  [18 dpH52-55 cmH20] 20 cmH20    Physical Exam:  Physical Exam   Constitutional: He is well-developed, well-nourished, and in no distress. Vital signs are normal. He appears lethargic (sedated). He appears to not be writhing in pain and not malnourished. He appears unhealthy. He has a sickly appearance. No distress. He is intubated.   HENT:   Head: Normocephalic and atraumatic.   Eyes: Pupils are equal, round, and reactive to light. Right eye exhibits no discharge. Left eye exhibits no discharge.   Neck: Neck supple.   Cardiovascular: Normal rate and regular  rhythm.    Pulses:       Radial pulses are 2+ on the right side, and 2+ on the left side.        Dorsalis pedis pulses are 2+ on the right side, and 2+ on the left side.   Pulmonary/Chest: He is intubated. No respiratory distress (on vent). He has decreased breath sounds in the right lower field and the left lower field.   Abdominal: Soft. He exhibits no distension. Bowel sounds are hypoactive. There is no tenderness.   Genitourinary:   Genitourinary Comments: Mcgill    Musculoskeletal: Normal range of motion.   Mild general edema   Lymphadenopathy:     He has no cervical adenopathy.   Neurological: He appears lethargic (sedated).   sedated   Skin: Skin is warm, dry and intact. He is not diaphoretic. No cyanosis.       Laboratory (Last 24H):  AM labs pending    ABGs:   Recent Labs  Lab 02/26/17  0450   PH 7.418   PCO2 54.2*   HCO3 35.0*   POCSATURATED 92*   BE 10     Microbiology Results (last 7 days)     Procedure Component Value Units Date/Time    Blood Culture #1 **CANNOT BE ORDERED STAT** [[id number]] Collected:  02/22/17 1030    Order Status:  Completed Specimen:  Blood from Peripheral, Hand, Right Updated:  02/25/17 2212     Blood Culture, Routine No Growth to date     Blood Culture, Routine No Growth to date     Blood Culture, Routine No Growth to date     Blood Culture, Routine No Growth to date    Blood Culture #2 **CANNOT BE ORDERED STAT** [[id number]] Collected:  02/22/17 1045    Order Status:  Completed Specimen:  Blood from Peripheral, Hand, Right Updated:  02/25/17 2212     Blood Culture, Routine No Growth to date     Blood Culture, Routine No Growth to date     Blood Culture, Routine No Growth to date     Blood Culture, Routine No Growth to date    Culture, Respiratory with Gram Stain [[id number]] Collected:  02/23/17 1647    Order Status:  Completed Specimen:  Respiratory from Sputum, Induced Updated:  02/25/17 1129     Respiratory Culture Normal respiratory roxana     Gram Stain (Respiratory) >10  epithelial cells per low power field     Gram Stain (Respiratory) Many WBC's     Gram Stain (Respiratory) Many Gram negative rods     Gram Stain (Respiratory) Few Gram positive cocci     Gram Stain (Respiratory) Rare Gram negative diplococci    Narrative:       Endotracheal tube suction    Aerobic culture [[id number]] Collected:  02/24/17 0933    Order Status:  Sent Specimen:  Wound from Arm, Right Updated:  02/24/17 0933    Narrative:       Right axilla          Chest X-Ray:         Film and report reviewed:  No significant change from yesterday's film    ASSESSMENT/PLAN:     Problem   Acute Respiratory Failure With Hypoxia   Pneumonia Due to Infectious Organism   Hyperglycemia, Drug-Induced   Allergic Reaction   Anaphylaxis   Abscess of Axilla, Right       PLAN:    1. Neuro: ICU neuro monitoring. Very agitated with any sedation weaning.     2. Pulmonary: Vent settings reviewed and adjusted. CTA chest 2/24 no PE but + bibasal infiltrates. Cont Duoneb, MM nebs, steroids and IVAB. Wean FiO2 as tolerated. Daily CXR     3. Cardiac: ICU Cardiac monitoring.     4. Renal: Mcgill in place, monitor I/Os      5. Infectious Disease: Follow fever curve. Sputum + GNR with ID&S still pending. Blood cultures NGTD. Axilla abscess healing and no drainage. Cont Zosyn.  Afeb.     6. Hematology/Oncology: monitor for bleeding     7. Endocrine: Monitor glucose and cont SSI. Wean steroids     8. Fluids/Electrolytes/Nutrition/GI: tolerating TF and cont Miralax.  Replace electrolytes as needed.      9. Musculoskeletal: ROM     10. Pain Management: Fentanyl infusion     11. Discharge and Palliative Care: Plan home with family     Preventive Measures and Monitoring:   Stress Ulcer: Pepcid  Nutrition: TF  Glucose control: SSI  Bowel prophylaxis: prn Dulcolax  DVT prophylaxis: LMWH/SCDs  Hx CAD on B-Blocker: no hx CAD  Head of Bed/Reposition: Elevate HOB and turn Q1-2 hours   Early Mobility: OOB once off sedation  SAT/SBT: daily once O2 demand  improves  RASS goal: -2  Vent Day: #5  OG Day: #5  Mcgill Day: #5  IVAB Day: #5  Code Status: Full    Counseling/Consultation: I have discussed the care of this patient in detail with multidisciplinary team during rounds, bedside nursing staff and Dr. Banks and Dr. Yu.  Discussed care in detail with spouse and parents at bedside and all questions answered.     Critical Care Time greater than: 54 minutes    Critical care was time spent personally by me on the following activities: development of treatment plan with patient or surrogate and bedside caregivers, discussions with consultants, evaluation of patient's response to treatment, examination of patient, ordering and performing treatments and interventions, ordering and review of laboratory studies, ordering and review of radiographic studies, pulse oximetry, re-evaluation of patient's condition.  This critical care time did not overlap with that of any other provider or involve time of any procedures.    SWAPNA Reyes [facility]  [facility]

## 2017-02-27 LAB
ALBUMIN SERPL BCP-MCNC: 3.2 G/DL
ALLENS TEST: ABNORMAL
ALP SERPL-CCNC: 41 U/L
ALT SERPL W/O P-5'-P-CCNC: 106 U/L
ANION GAP SERPL CALC-SCNC: 11 MMOL/L
AST SERPL-CCNC: 44 U/L
BACTERIA BLD CULT: NORMAL
BACTERIA BLD CULT: NORMAL
BACTERIA SPEC AEROBE CULT: NORMAL
BASOPHILS # BLD AUTO: ABNORMAL K/UL
BASOPHILS NFR BLD: 0 %
BILIRUB SERPL-MCNC: 0.5 MG/DL
BUN SERPL-MCNC: 31 MG/DL
CALCIUM SERPL-MCNC: 8.9 MG/DL
CHLORIDE SERPL-SCNC: 98 MMOL/L
CO2 SERPL-SCNC: 29 MMOL/L
CREAT SERPL-MCNC: 1.1 MG/DL
DELSYS: ABNORMAL
DIFFERENTIAL METHOD: ABNORMAL
EOSINOPHIL # BLD AUTO: ABNORMAL K/UL
EOSINOPHIL NFR BLD: 0 %
ERYTHROCYTE [DISTWIDTH] IN BLOOD BY AUTOMATED COUNT: 13.4 %
EST. GFR  (AFRICAN AMERICAN): >60 ML/MIN/1.73 M^2
EST. GFR  (NON AFRICAN AMERICAN): >60 ML/MIN/1.73 M^2
FIO2: 60
GLUCOSE SERPL-MCNC: 289 MG/DL
GRAM STN SPEC: NORMAL
HCO3 UR-SCNC: 33.3 MMOL/L (ref 24–28)
HCT VFR BLD AUTO: 38 %
HGB BLD-MCNC: 12.9 G/DL
IP: 10
IT: 0.96
LYMPHOCYTES # BLD AUTO: ABNORMAL K/UL
LYMPHOCYTES NFR BLD: 12 %
MCH RBC QN AUTO: 31 PG
MCHC RBC AUTO-ENTMCNC: 33.9 %
MCV RBC AUTO: 91 FL
MODE: ABNORMAL
MONOCYTES # BLD AUTO: ABNORMAL K/UL
MONOCYTES NFR BLD: 3 %
NEUTROPHILS NFR BLD: 84 %
NEUTS BAND NFR BLD MANUAL: 1 %
PCO2 BLDA: 49.3 MMHG (ref 35–45)
PEEP: 15
PH SMN: 7.44 [PH] (ref 7.35–7.45)
PLATELET # BLD AUTO: 242 K/UL
PLATELET BLD QL SMEAR: ABNORMAL
PMV BLD AUTO: 9 FL
PO2 BLDA: 76 MMHG (ref 80–100)
POC BE: 9 MMOL/L
POC SATURATED O2: 95 % (ref 95–100)
POCT GLUCOSE: 203 MG/DL (ref 70–110)
POCT GLUCOSE: 219 MG/DL (ref 70–110)
POCT GLUCOSE: 249 MG/DL (ref 70–110)
POCT GLUCOSE: 263 MG/DL (ref 70–110)
POCT GLUCOSE: 264 MG/DL (ref 70–110)
POCT GLUCOSE: 317 MG/DL (ref 70–110)
POTASSIUM SERPL-SCNC: 4.4 MMOL/L
PROT SERPL-MCNC: 6.7 G/DL
RBC # BLD AUTO: 4.16 M/UL
SAMPLE: ABNORMAL
SITE: ABNORMAL
SODIUM SERPL-SCNC: 138 MMOL/L
WBC # BLD AUTO: 12.89 K/UL

## 2017-02-27 PROCEDURE — 27200966 HC CLOSED SUCTION SYSTEM

## 2017-02-27 PROCEDURE — 25000242 PHARM REV CODE 250 ALT 637 W/ HCPCS: Performed by: INTERNAL MEDICINE

## 2017-02-27 PROCEDURE — 85007 BL SMEAR W/DIFF WBC COUNT: CPT

## 2017-02-27 PROCEDURE — 36415 COLL VENOUS BLD VENIPUNCTURE: CPT

## 2017-02-27 PROCEDURE — 20000000 HC ICU ROOM

## 2017-02-27 PROCEDURE — 80053 COMPREHEN METABOLIC PANEL: CPT

## 2017-02-27 PROCEDURE — 94668 MNPJ CHEST WALL SBSQ: CPT

## 2017-02-27 PROCEDURE — 85027 COMPLETE CBC AUTOMATED: CPT

## 2017-02-27 PROCEDURE — 63600175 PHARM REV CODE 636 W HCPCS: Performed by: NURSE PRACTITIONER

## 2017-02-27 PROCEDURE — 25000003 PHARM REV CODE 250: Performed by: INTERNAL MEDICINE

## 2017-02-27 PROCEDURE — 25000003 PHARM REV CODE 250: Performed by: NURSE PRACTITIONER

## 2017-02-27 PROCEDURE — 94003 VENT MGMT INPAT SUBQ DAY: CPT

## 2017-02-27 PROCEDURE — 99291 CRITICAL CARE FIRST HOUR: CPT | Mod: ,,, | Performed by: NURSE PRACTITIONER

## 2017-02-27 PROCEDURE — 94640 AIRWAY INHALATION TREATMENT: CPT

## 2017-02-27 PROCEDURE — 94002 VENT MGMT INPAT INIT DAY: CPT

## 2017-02-27 PROCEDURE — 63600175 PHARM REV CODE 636 W HCPCS: Performed by: INTERNAL MEDICINE

## 2017-02-27 RX ORDER — FUROSEMIDE 10 MG/ML
60 INJECTION INTRAMUSCULAR; INTRAVENOUS EVERY 12 HOURS
Status: DISCONTINUED | OUTPATIENT
Start: 2017-02-27 | End: 2017-03-01

## 2017-02-27 RX ORDER — FUROSEMIDE 10 MG/ML
40 INJECTION INTRAMUSCULAR; INTRAVENOUS ONCE
Status: COMPLETED | OUTPATIENT
Start: 2017-02-27 | End: 2017-02-27

## 2017-02-27 RX ADMIN — PIPERACILLIN SODIUM AND TAZOBACTAM SODIUM 4.5 G: 4; .5 INJECTION, POWDER, FOR SOLUTION INTRAVENOUS at 08:02

## 2017-02-27 RX ADMIN — PROPOFOL 50 MCG/KG/MIN: 10 INJECTION, EMULSION INTRAVENOUS at 04:02

## 2017-02-27 RX ADMIN — DIPHENHYDRAMINE HYDROCHLORIDE 25 MG: 12.5 SOLUTION ORAL at 05:02

## 2017-02-27 RX ADMIN — PROPOFOL 50 MCG/KG/MIN: 10 INJECTION, EMULSION INTRAVENOUS at 11:02

## 2017-02-27 RX ADMIN — DIPHENHYDRAMINE HYDROCHLORIDE 25 MG: 12.5 SOLUTION ORAL at 10:02

## 2017-02-27 RX ADMIN — METHYLPREDNISOLONE SODIUM SUCCINATE 60 MG: 125 INJECTION, POWDER, FOR SOLUTION INTRAMUSCULAR; INTRAVENOUS at 08:02

## 2017-02-27 RX ADMIN — IPRATROPIUM BROMIDE AND ALBUTEROL SULFATE 3 ML: .5; 3 SOLUTION RESPIRATORY (INHALATION) at 12:02

## 2017-02-27 RX ADMIN — INSULIN ASPART 8 UNITS: 100 INJECTION, SOLUTION INTRAVENOUS; SUBCUTANEOUS at 03:02

## 2017-02-27 RX ADMIN — INSULIN ASPART 2 UNITS: 100 INJECTION, SOLUTION INTRAVENOUS; SUBCUTANEOUS at 10:02

## 2017-02-27 RX ADMIN — IPRATROPIUM BROMIDE AND ALBUTEROL SULFATE 3 ML: .5; 3 SOLUTION RESPIRATORY (INHALATION) at 01:02

## 2017-02-27 RX ADMIN — INSULIN ASPART 4 UNITS: 100 INJECTION, SOLUTION INTRAVENOUS; SUBCUTANEOUS at 06:02

## 2017-02-27 RX ADMIN — PROPOFOL 50 MCG/KG/MIN: 10 INJECTION, EMULSION INTRAVENOUS at 03:02

## 2017-02-27 RX ADMIN — POLYETHYLENE GLYCOL 3350 17 G: 17 POWDER, FOR SOLUTION ORAL at 08:02

## 2017-02-27 RX ADMIN — PROPOFOL 50 MCG/KG/MIN: 10 INJECTION, EMULSION INTRAVENOUS at 01:02

## 2017-02-27 RX ADMIN — PROPOFOL 50 MCG/KG/MIN: 10 INJECTION, EMULSION INTRAVENOUS at 02:02

## 2017-02-27 RX ADMIN — FAMOTIDINE 20 MG: 20 INJECTION, SOLUTION INTRAVENOUS at 08:02

## 2017-02-27 RX ADMIN — INSULIN ASPART 4 UNITS: 100 INJECTION, SOLUTION INTRAVENOUS; SUBCUTANEOUS at 05:02

## 2017-02-27 RX ADMIN — PROPOFOL 50 MCG/KG/MIN: 10 INJECTION, EMULSION INTRAVENOUS at 10:02

## 2017-02-27 RX ADMIN — DIPHENHYDRAMINE HYDROCHLORIDE 25 MG: 12.5 SOLUTION ORAL at 01:02

## 2017-02-27 RX ADMIN — PROPOFOL 50 MCG/KG/MIN: 10 INJECTION, EMULSION INTRAVENOUS at 05:02

## 2017-02-27 RX ADMIN — INSULIN ASPART 6 UNITS: 100 INJECTION, SOLUTION INTRAVENOUS; SUBCUTANEOUS at 10:02

## 2017-02-27 RX ADMIN — PROPOFOL 50 MCG/KG/MIN: 10 INJECTION, EMULSION INTRAVENOUS at 06:02

## 2017-02-27 RX ADMIN — IPRATROPIUM BROMIDE AND ALBUTEROL SULFATE 3 ML: .5; 3 SOLUTION RESPIRATORY (INHALATION) at 08:02

## 2017-02-27 RX ADMIN — ENOXAPARIN SODIUM 40 MG: 100 INJECTION SUBCUTANEOUS at 01:02

## 2017-02-27 RX ADMIN — INSULIN ASPART 3 UNITS: 100 INJECTION, SOLUTION INTRAVENOUS; SUBCUTANEOUS at 01:02

## 2017-02-27 RX ADMIN — PIPERACILLIN SODIUM AND TAZOBACTAM SODIUM 4.5 G: 4; .5 INJECTION, POWDER, FOR SOLUTION INTRAVENOUS at 04:02

## 2017-02-27 RX ADMIN — IPRATROPIUM BROMIDE AND ALBUTEROL SULFATE 3 ML: .5; 3 SOLUTION RESPIRATORY (INHALATION) at 07:02

## 2017-02-27 RX ADMIN — FUROSEMIDE 60 MG: 10 INJECTION, SOLUTION INTRAMUSCULAR; INTRAVENOUS at 08:02

## 2017-02-27 RX ADMIN — Medication 2500 MCG: at 10:02

## 2017-02-27 RX ADMIN — ACETYLCYSTEINE 4 ML: 100 SOLUTION ORAL; RESPIRATORY (INHALATION) at 07:02

## 2017-02-27 RX ADMIN — CHLORHEXIDINE GLUCONATE 15 ML: 1.2 RINSE ORAL at 08:02

## 2017-02-27 RX ADMIN — PIPERACILLIN SODIUM AND TAZOBACTAM SODIUM 4.5 G: 4; .5 INJECTION, POWDER, FOR SOLUTION INTRAVENOUS at 11:02

## 2017-02-27 RX ADMIN — PROPOFOL 50 MCG/KG/MIN: 10 INJECTION, EMULSION INTRAVENOUS at 08:02

## 2017-02-27 RX ADMIN — FUROSEMIDE 40 MG: 10 INJECTION, SOLUTION INTRAMUSCULAR; INTRAVENOUS at 10:02

## 2017-02-27 RX ADMIN — ACETYLCYSTEINE 4 ML: 100 SOLUTION ORAL; RESPIRATORY (INHALATION) at 08:02

## 2017-02-27 NOTE — PROGRESS NOTES
[facility] Hospital Medicine  Progress Note    Patient Name: Matty Nunn  MRN: [id number]  Patient Class: IP- Inpatient   Admission Date: 2/22/2017  Length of Stay: 4 days  Attending Physician: Victorino Yu MD  Primary Care Provider: Ruddy Noble MD        Subjective:     Principal Problem:Acute respiratory failure with hypoxia    HPI:  Patient is a 35 yo male with a hx of gout presented to [facility] on 2/21/17 with a right axillary abscess which was I&D'd in the ER.  Patient was sent home on Bactrim.  He return to the ER today for increase difficulty breathing.  He was intubated in the ER, but self extubated himself.  He was then re intubated and sent to [facility] for further ICU care.      All history taken from records as pt has been intubated and sedated since prior to arrival.  Case d/w Dr. Mckinney ([facility] ER attending) who states all hx was taken from the pt prior to intubation.  Patient has a notable breath ceci on his left leg.      Hospital Course:       Interval History:  No acute problems or events and able to decrease supplemental oxygen partially.    Review of Systems   Unable to perform ROS: Intubated     Objective:     Vital Signs (Most Recent):  Temp: 99.8 °F (37.7 °C) (02/26/17 1905)  Pulse: 95 (02/26/17 2106)  Resp: 17 (02/26/17 2106)  BP: 123/64 (02/26/17 2106)  SpO2: (!) 93 % (02/26/17 2106) Vital Signs (24h Range):  Temp:  [98.2 °F (36.8 °C)-99.8 °F (37.7 °C)] 99.8 °F (37.7 °C)  Pulse:  [65-95] 95  Resp:  [10-25] 17  SpO2:  [93 %-96 %] 93 %  BP: ()/(52-87) 123/64     Weight: (!) 168 kg (370 lb 6 oz)  Body mass index is 48.86 kg/(m^2).    Intake/Output Summary (Last 24 hours) at 02/26/17 2137  Last data filed at 02/26/17 2005   Gross per 24 hour   Intake          2722.48 ml   Output             2515 ml   Net           207.48 ml      Physical Exam   Constitutional: He appears well-developed and well-nourished. No distress.   HENT:   Head:  Normocephalic and atraumatic.   Mouth/Throat: Oropharynx is clear and moist.   Eyes: Conjunctivae and EOM are normal. Pupils are equal, round, and reactive to light.   Neck: No JVD present. No thyromegaly present.   Cardiovascular: Normal rate, regular rhythm and normal heart sounds.  Exam reveals no gallop and no friction rub.    No murmur heard.  Pulmonary/Chest: Effort normal and breath sounds normal. No respiratory distress. He has no wheezes. He has no rales.   Abdominal: Soft. Bowel sounds are normal. He exhibits no distension. There is no tenderness. There is no rebound and no guarding.   Musculoskeletal: Normal range of motion. He exhibits no edema or tenderness.   Lymphadenopathy:     He has no cervical adenopathy.   Neurological: He has normal reflexes. He displays normal reflexes. No cranial nerve deficit.   Intubated and sedated   Skin: Skin is warm and dry. No rash noted. He is not diaphoretic. No erythema.   Small left anterior axillary wound.  Dressed clean and intact.  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       Significant Labs:   CBC:     Recent Labs  Lab 02/25/17  0354 02/26/17  1050   WBC 13.01* 10.52   HGB 13.5* 13.3*   HCT 39.6* 38.9*    227     CMP:     Recent Labs  Lab 02/25/17  0354 02/26/17  1050    138   K 4.8 4.4    99   CO2 28 31*   * 288*   BUN 23* 33*   CREATININE 1.1 0.9   CALCIUM 9.1 9.1   PROT 6.9 6.8   ALBUMIN 3.3* 3.2*   BILITOT 0.4 0.6   ALKPHOS 43* 38*   AST 33 40   ALT 40 71*   ANIONGAP 9 8   EGFRNONAA >60 >60       Significant Imaging: I have reviewed all pertinent imaging results/findings within the past 24 hours.    Assessment/Plan:      * Acute respiratory failure with hypoxia  Increasing difficulty with oxygenation requiring 100% FiO2 and increasing PEEP.  CT chest negative for embolism but segmental consolidation bilaterally in the bases worse on the right.  Receiving chest physiotherapy with nebulizer treatments for possible mucus plugging.   Able to decrease FiO2 partially.    Allergic reaction  Associated with Bactrim initiation.  He received Steroids and anti-histamines.  No epinephrine.  Intubated for airway protection.  No associated rash.    Abscess of axilla, right  Initially prescribed Bactrim now on Clindamycin.    Anaphylaxis  Associated with staring Bactrim and presumed allergic response.  Intubated for airway protection.    Pneumonia of both lower lobes due to infectious organism  CXR and CTA suggest lower lobe consolidation/atelectasis.  Patient started on Zosyn and Clindamycin.  Clindamycin will cover axillary abscess and MRSA.    Hyperglycemia, drug-induced  Accuchecks and SSI, related to steroids.    VTE Risk Mitigation         Ordered     enoxaparin injection 40 mg  Daily     Route:  Subcutaneous        02/22/17 2146     Medium Risk of VTE  Once      02/22/17 1802     Place GETACHEW hose  Until discontinued      02/22/17 1802     Place sequential compression device  Until discontinued      02/22/17 1802          Victorino Yu MD  Department of Hospital Medicine   [facility]    Critical care time:  30 minutes

## 2017-02-27 NOTE — PROGRESS NOTES
Pt transferred to sizewise big turn 2.  Pt tolerated transfer well.  Bed set to continuous rotation therapy.  VSS.  Will continue to monitor.      Sedation vacation performed.  Pt reaching for ETT and lines within minutes of sedation vacation.  Pt trying to pull his arms out of restraints and kicking his legs.  SEdation resumed at previous rate.  SHERLYN Grove notified.  Will continue to monitor.

## 2017-02-27 NOTE — PLAN OF CARE
Problem: Patient Care Overview  Goal: Plan of Care Review  Outcome: Ongoing (interventions implemented as appropriate)  Pt continues to require 60% FiO2 to keep sats >92.  pts sats hanging around 94% despite percussion therapy, size wise big turn 2 continuous rotation therapy, and breathing treatments.  Pt VSS.  Pt tolerating tube feeds.  pts urine output adequate.  Pt gets agitated at times and will reach for ETT and other medical equipment.  BSWR continued as ordered.

## 2017-02-27 NOTE — PROGRESS NOTES
Progress Note  Critical Care    Admit Date: 2/22/2017   LOS: 5 days     Follow-up For: Resp Failure and PNA     SUBJECTIVE:   Change over last 24 hours: Devi TF and slowly weaning FiO2.    ROS:  Review of Systems   Unable to perform ROS: Intubated       Continuous Infusions:   fentanyl 7.5 mL/hr at 02/27/17 0800    propofol 50 mcg/kg/min (02/27/17 0849)     Review of patient's allergies indicates:   Allergen Reactions    Bactrim [sulfamethoxazole-trimethoprim] Swelling    Rifamycin analogues        OBJECTIVE:     Vital Signs (Most Recent)  Temp: 98.8 °F (37.1 °C) (02/27/17 0700)  Pulse: 78 (02/27/17 1023)  Resp: 18 (02/27/17 1023)  BP: 138/66 (02/27/17 0800)  SpO2: (!) 94 % (02/27/17 1023)    Vital Signs Range (Last 24H):  Temp:  [98.8 °F (37.1 °C)-99.8 °F (37.7 °C)]   Pulse:  []   Resp:  [12-27]   BP: (111-153)/(45-87)   SpO2:  [92 %-98 %]     I & O (Last 24H):  Intake/Output Summary (Last 24 hours) at 02/27/17 1029  Last data filed at 02/27/17 0801   Gross per 24 hour   Intake          2927.58 ml   Output             2355 ml   Net           572.58 ml       Ventilator Data (Last 24H):     Vent Mode: A/C  Oxygen Concentration (%):  [55-60] 55  Resp Rate Total:  [14 br/min-22 br/min] 17 br/min  Vt Set:  [0 mL] 0 mL  PEEP/CPAP:  [12 cmH20-15 cmH20] 12 cmH20  Pressure Support:  [0 cmH20] 0 cmH20  Mean Airway Pressure:  [16 krZ81-19 cmH20] 16 cmH20    Physical Exam:  Physical Exam   Constitutional: He is well-developed, well-nourished, and in no distress. Vital signs are normal. He appears lethargic (sedated). He appears to not be writhing in pain and not malnourished. He appears unhealthy.  Non-toxic appearance. He has a sickly appearance. No distress. He is intubated.   HENT:   Head: Normocephalic and atraumatic.   Nose:       Mouth/Throat: Oropharynx is clear and moist.   Eyes: Pupils are equal, round, and reactive to light.   Neck: Neck supple.   Cardiovascular: Normal rate and regular rhythm.    Pulses:        Radial pulses are 2+ on the right side, and 2+ on the left side.        Dorsalis pedis pulses are 1+ on the right side, and 1+ on the left side.   Pulmonary/Chest: No accessory muscle usage. He is intubated. No respiratory distress. He has decreased breath sounds in the right lower field and the left lower field. He has rales.   Abdominal: Soft. He exhibits no distension. Bowel sounds are hypoactive. There is no tenderness.   Genitourinary:   Genitourinary Comments: Mcgill    Musculoskeletal: Normal range of motion.   General hand edema   Lymphadenopathy:     He has no cervical adenopathy.   Neurological: He appears lethargic (sedated).   sedated   Skin: Skin is warm, dry and intact. He is not diaphoretic. No cyanosis.       Laboratory (Last 24H):  CBC:    Recent Labs  Lab 02/27/17  0322   WBC 12.89*   HGB 12.9*   HCT 38.0*        CMP:    Recent Labs  Lab 02/27/17  0322   CALCIUM 8.9   ALBUMIN 3.2*   PROT 6.7      K 4.4   CO2 29   CL 98   BUN 31*   CREATININE 1.1   ALKPHOS 41*   *   AST 44*   BILITOT 0.5       ABGs:   Recent Labs  Lab 02/27/17  0434   PH 7.438   PCO2 49.3*   HCO3 33.3*   POCSATURATED 95   BE 9     Microbiology Results (last 7 days)     Procedure Component Value Units Date/Time    Blood Culture #1 **CANNOT BE ORDERED STAT** [[id number]] Collected:  02/22/17 1030    Order Status:  Completed Specimen:  Blood from Peripheral, Hand, Right Updated:  02/26/17 2212     Blood Culture, Routine No Growth to date     Blood Culture, Routine No Growth to date     Blood Culture, Routine No Growth to date     Blood Culture, Routine No Growth to date     Blood Culture, Routine No Growth to date    Blood Culture #2 **CANNOT BE ORDERED STAT** [[id number]] Collected:  02/22/17 1045    Order Status:  Completed Specimen:  Blood from Peripheral, Hand, Right Updated:  02/26/17 2212     Blood Culture, Routine No Growth to date     Blood Culture, Routine No Growth to date     Blood Culture, Routine No  Growth to date     Blood Culture, Routine No Growth to date     Blood Culture, Routine No Growth to date    Culture, Respiratory with Gram Stain [[id number]] Collected:  02/23/17 1647    Order Status:  Completed Specimen:  Respiratory from Sputum, Induced Updated:  02/25/17 1129     Respiratory Culture Normal respiratory roxana     Gram Stain (Respiratory) >10 epithelial cells per low power field     Gram Stain (Respiratory) Many WBC's     Gram Stain (Respiratory) Many Gram negative rods     Gram Stain (Respiratory) Few Gram positive cocci     Gram Stain (Respiratory) Rare Gram negative diplococci    Narrative:       Endotracheal tube suction    Aerobic culture [[id number]] Collected:  02/24/17 0933    Order Status:  Sent Specimen:  Wound from Arm, Right Updated:  02/24/17 0933    Narrative:       Right axilla          Chest X-Ray:         Film and report reviewed:  No change from previous film    ASSESSMENT/PLAN:     Problem   Acute Respiratory Failure With Hypoxia   Pneumonia of Both Lower Lobes Due to Infectious Organism   Hyperglycemia, Drug-Induced   Allergic Reaction   Anaphylaxis   Abscess of Axilla, Right       PLAN:    1. Neuro: ICU neuro monitoring. Very agitated with any sedation weaning     2. Pulmonary: Vent settings reviewed and adjusted. CTA chest 2/25 no PE but + bibasal infiltrates. Cont Duoneb, MM nebs, OET suctioning, steroids and IVAB. Wean FiO2 as tolerated. Daily CXR     3. Cardiac: ICU Cardiac monitoring. Lasix repeat X 1.      4. Renal: Mcgill in place, monitor I/Os      5. Infectious Disease: Follow fever curve. Sputum + GNR with ID&S still pending. Blood cultures NGTD. Axilla abscess healing and no drainage. Cont Zosyn     6. Hematology/Oncology: monitor for bleeding     7. Endocrine: Monitor glucose and cont SSI. Wean steroids     8. Fluids/Electrolytes/Nutrition/GI: tolerating TF and cont Miralax     9. Musculoskeletal: ROM     10. Pain Management: Fentanyl infusion     11. Discharge and  Palliative Care: Plan home with family     Preventive Measures and Monitoring:   Stress Ulcer: Pepcid  Nutrition: TF  Glucose control: SSI  Bowel prophylaxis: prn Dulcolax  DVT prophylaxis: LMWH/SCDs  Hx CAD on B-Blocker: no hx CAD  Head of Bed/Reposition: Elevate HOB and turn Q1-2 hours   Early Mobility: OOB once off sedation  SAT/SBT: daily once O2 demand improves  RASS goal: -2  Vent Day: #5  OG Day: #5  Mcgill Day: #5  IVAB Day: #5  Code Status: Full    Counseling/Consultation: I have discussed the care of this patient in detail with multidisciplinary team during rounds, bedside nursing staff and Dr. Ortega and Dr. Yu    Critical Care Time greater than: 52 minutes    Critical care was time spent personally by me on the following activities: development of treatment plan with patient or surrogate and bedside caregivers, discussions with consultants, evaluation of patient's response to treatment, examination of patient, ordering and performing treatments and interventions, ordering and review of laboratory studies, ordering and review of radiographic studies, pulse oximetry, re-evaluation of patient's condition.  This critical care time did not overlap with that of any other provider or involve time of any procedures.    SWAPNA Reyes [facility] Center[facility]-BC  [facility]

## 2017-02-27 NOTE — SUBJECTIVE & OBJECTIVE
Interval History:  No acute problems or events and able to decrease supplemental oxygen partially.    Review of Systems   Unable to perform ROS: Intubated     Objective:     Vital Signs (Most Recent):  Temp: 99.8 °F (37.7 °C) (02/26/17 1905)  Pulse: 95 (02/26/17 2106)  Resp: 17 (02/26/17 2106)  BP: 123/64 (02/26/17 2106)  SpO2: (!) 93 % (02/26/17 2106) Vital Signs (24h Range):  Temp:  [98.2 °F (36.8 °C)-99.8 °F (37.7 °C)] 99.8 °F (37.7 °C)  Pulse:  [65-95] 95  Resp:  [10-25] 17  SpO2:  [93 %-96 %] 93 %  BP: ()/(52-87) 123/64     Weight: (!) 168 kg (370 lb 6 oz)  Body mass index is 48.86 kg/(m^2).    Intake/Output Summary (Last 24 hours) at 02/26/17 2137  Last data filed at 02/26/17 2005   Gross per 24 hour   Intake          2722.48 ml   Output             2515 ml   Net           207.48 ml      Physical Exam   Constitutional: He appears well-developed and well-nourished. No distress.   HENT:   Head: Normocephalic and atraumatic.   Mouth/Throat: Oropharynx is clear and moist.   Eyes: Conjunctivae and EOM are normal. Pupils are equal, round, and reactive to light.   Neck: No JVD present. No thyromegaly present.   Cardiovascular: Normal rate, regular rhythm and normal heart sounds.  Exam reveals no gallop and no friction rub.    No murmur heard.  Pulmonary/Chest: Effort normal and breath sounds normal. No respiratory distress. He has no wheezes. He has no rales.   Abdominal: Soft. Bowel sounds are normal. He exhibits no distension. There is no tenderness. There is no rebound and no guarding.   Musculoskeletal: Normal range of motion. He exhibits no edema or tenderness.   Lymphadenopathy:     He has no cervical adenopathy.   Neurological: He has normal reflexes. He displays normal reflexes. No cranial nerve deficit.   Intubated and sedated   Skin: Skin is warm and dry. No rash noted. He is not diaphoretic. No erythema.   Small left anterior axillary wound.  Dressed clean and intact.  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       Significant Labs:   CBC:     Recent Labs  Lab 02/25/17  0354 02/26/17  1050   WBC 13.01* 10.52   HGB 13.5* 13.3*   HCT 39.6* 38.9*    227     CMP:     Recent Labs  Lab 02/25/17  0354 02/26/17  1050    138   K 4.8 4.4    99   CO2 28 31*   * 288*   BUN 23* 33*   CREATININE 1.1 0.9   CALCIUM 9.1 9.1   PROT 6.9 6.8   ALBUMIN 3.3* 3.2*   BILITOT 0.4 0.6   ALKPHOS 43* 38*   AST 33 40   ALT 40 71*   ANIONGAP 9 8   EGFRNONAA >60 >60       Significant Imaging: I have reviewed all pertinent imaging results/findings within the past 24 hours.

## 2017-02-27 NOTE — PLAN OF CARE
Met with patient's wife at bedside. She verbalized being happy with care. She is a teacher in the [employer]. She is able to be off work and stay with her  at this time. She has family support to assist with her children.   Provided contact information for case management and informed her of role of case management.      02/27/17 1424   Discharge Assessment   Assessment Type Discharge Planning Assessment   Confirmed/corrected address and phone number on facesheet? Yes   Assessment information obtained from? Medical Record;Other  (wife)   Expected Length of Stay (days) (TBD)   Prior to hospitilization cognitive status: Alert/Oriented   Prior to hospitalization functional status: Independent   Current cognitive status: Coma/Sedated/Intubated   Current Functional Status: Completely Dependent   Arrived From home or self-care   Lives With spouse;child(ike), dependent  (children ages 9, 10, 13, and 14 (all are adopted))   Able to Return to Prior Arrangements unable to determine at this time (comments)   Is patient able to care for self after discharge? Unable to determine at this time (comments)   How many people do you have in your home that can help with your care after discharge? 1   Who are your caregiver(s) and their phone number(s)? Kasandra Nunn, wife, [phone]   Patient's perception of discharge disposition other (comments)  (unable to determine at this time. )   Readmission Within The Last 30 Days no previous admission in last 30 days   Patient currently being followed by outpatient case management? No   Patient currently receives home health services? No   Does the patient currently use HME? No   Equipment Currently Used at Home none   Do you have any problems affording any of your prescribed medications? No   Is the patient taking medications as prescribed? yes   Do you have any financial concerns preventing you from receiving the healthcare you need? No   Does the patient have  transportation to healthcare appointments? Yes   Transportation Available car   On Dialysis? No   Discharge Plan A Home with family   Discharge Plan B Long-term acute care facility (LTAC)   Patient/Family In Agreement With Plan yes

## 2017-02-27 NOTE — ASSESSMENT & PLAN NOTE
Increasing difficulty with oxygenation requiring 100% FiO2 and increasing PEEP.  CT chest negative for embolism but segmental consolidation bilaterally in the bases worse on the right.  Receiving chest physiotherapy with nebulizer treatments for possible mucus plugging.  Able to decrease FiO2 partially.

## 2017-02-27 NOTE — ASSESSMENT & PLAN NOTE
Associated with Bactrim initiation.  He received Steroids and anti-histamines.  No epinephrine.  Intubated for airway protection.  No associated rash.

## 2017-02-27 NOTE — PLAN OF CARE
Problem: Patient Care Overview  Goal: Plan of Care Review  Outcome: Ongoing (interventions implemented as appropriate)  No major changes overnight. Tmax for last 24 hours 99.8 F. Fentanyl and Propofol  to achieve RASS of -2.   NSR / sinus tach (110-120) tonight on telemetry monitor. Hemodynamically stable without vasoactive support. O2 sats via pulse oximetry >92 % on current vent settings for most of the night, but still requiring 60%fiO2 and 15 PEEP to maintain. Urine output adequate. Tube feeding currently at goal rate of 60 mL/hr and tolerating well with minimal residual, still no bowel movement. Patient on sizewise bed with rotation therapy on to assist with turning. Bilat soft wrist restraints remain in place for patient safety. Spouse and mother updated to plan of care and no further questions at this time.

## 2017-02-28 PROBLEM — J15.69 PNEUMONIA DUE TO GRAM-NEGATIVE BACTERIA: Status: ACTIVE | Noted: 2017-02-24

## 2017-02-28 LAB
ALBUMIN SERPL BCP-MCNC: 3.3 G/DL
ALLENS TEST: ABNORMAL
ALP SERPL-CCNC: 41 U/L
ALT SERPL W/O P-5'-P-CCNC: 190 U/L
ANION GAP SERPL CALC-SCNC: 11 MMOL/L
AST SERPL-CCNC: 70 U/L
BASOPHILS # BLD AUTO: ABNORMAL K/UL
BASOPHILS NFR BLD: 0 %
BILIRUB SERPL-MCNC: 0.9 MG/DL
BUN SERPL-MCNC: 39 MG/DL
CALCIUM SERPL-MCNC: 9 MG/DL
CHLORIDE SERPL-SCNC: 95 MMOL/L
CO2 SERPL-SCNC: 34 MMOL/L
CREAT SERPL-MCNC: 1.1 MG/DL
DELSYS: ABNORMAL
DIFFERENTIAL METHOD: ABNORMAL
EOSINOPHIL # BLD AUTO: ABNORMAL K/UL
EOSINOPHIL NFR BLD: 0 %
ERYTHROCYTE [DISTWIDTH] IN BLOOD BY AUTOMATED COUNT: 13.4 %
ERYTHROCYTE [SEDIMENTATION RATE] IN BLOOD BY WESTERGREN METHOD: 14 MM/H
EST. GFR  (AFRICAN AMERICAN): >60 ML/MIN/1.73 M^2
EST. GFR  (NON AFRICAN AMERICAN): >60 ML/MIN/1.73 M^2
FIO2: 70
GLUCOSE SERPL-MCNC: 273 MG/DL
HCO3 UR-SCNC: 38.3 MMOL/L (ref 24–28)
HCT VFR BLD AUTO: 40.1 %
HGB BLD-MCNC: 13.7 G/DL
IP: 10
IT: 0.96
LYMPHOCYTES # BLD AUTO: ABNORMAL K/UL
LYMPHOCYTES NFR BLD: 11 %
MCH RBC QN AUTO: 31.5 PG
MCHC RBC AUTO-ENTMCNC: 34.2 %
MCV RBC AUTO: 92 FL
MODE: ABNORMAL
MONOCYTES # BLD AUTO: ABNORMAL K/UL
MONOCYTES NFR BLD: 7 %
MYELOCYTES NFR BLD MANUAL: 3 %
NEUTROPHILS NFR BLD: 76 %
NEUTS BAND NFR BLD MANUAL: 3 %
PCO2 BLDA: 56.9 MMHG (ref 35–45)
PEEP: 15
PH SMN: 7.44 [PH] (ref 7.35–7.45)
PLATELET # BLD AUTO: 232 K/UL
PLATELET BLD QL SMEAR: ABNORMAL
PMV BLD AUTO: 9.4 FL
PO2 BLDA: 88 MMHG (ref 80–100)
POC BE: 14 MMOL/L
POC SATURATED O2: 97 % (ref 95–100)
POCT GLUCOSE: 212 MG/DL (ref 70–110)
POCT GLUCOSE: 228 MG/DL (ref 70–110)
POCT GLUCOSE: 252 MG/DL (ref 70–110)
POCT GLUCOSE: 252 MG/DL (ref 70–110)
POTASSIUM SERPL-SCNC: 4.4 MMOL/L
PROT SERPL-MCNC: 7.1 G/DL
RBC # BLD AUTO: 4.35 M/UL
SAMPLE: ABNORMAL
SITE: ABNORMAL
SODIUM SERPL-SCNC: 140 MMOL/L
TRIGL SERPL-MCNC: 319 MG/DL
WBC # BLD AUTO: 10.39 K/UL

## 2017-02-28 PROCEDURE — 94640 AIRWAY INHALATION TREATMENT: CPT

## 2017-02-28 PROCEDURE — 84478 ASSAY OF TRIGLYCERIDES: CPT

## 2017-02-28 PROCEDURE — 85007 BL SMEAR W/DIFF WBC COUNT: CPT

## 2017-02-28 PROCEDURE — 25000242 PHARM REV CODE 250 ALT 637 W/ HCPCS: Performed by: INTERNAL MEDICINE

## 2017-02-28 PROCEDURE — 25000003 PHARM REV CODE 250: Performed by: NURSE PRACTITIONER

## 2017-02-28 PROCEDURE — 99900035 HC TECH TIME PER 15 MIN (STAT)

## 2017-02-28 PROCEDURE — 63600175 PHARM REV CODE 636 W HCPCS: Performed by: NURSE PRACTITIONER

## 2017-02-28 PROCEDURE — 36600 WITHDRAWAL OF ARTERIAL BLOOD: CPT

## 2017-02-28 PROCEDURE — 94003 VENT MGMT INPAT SUBQ DAY: CPT

## 2017-02-28 PROCEDURE — 85027 COMPLETE CBC AUTOMATED: CPT

## 2017-02-28 PROCEDURE — 63600175 PHARM REV CODE 636 W HCPCS: Performed by: INTERNAL MEDICINE

## 2017-02-28 PROCEDURE — 80053 COMPREHEN METABOLIC PANEL: CPT

## 2017-02-28 PROCEDURE — 20000000 HC ICU ROOM

## 2017-02-28 PROCEDURE — 25000003 PHARM REV CODE 250: Performed by: INTERNAL MEDICINE

## 2017-02-28 PROCEDURE — 99291 CRITICAL CARE FIRST HOUR: CPT | Mod: ,,, | Performed by: NURSE PRACTITIONER

## 2017-02-28 RX ADMIN — PROPOFOL 50 MCG/KG/MIN: 10 INJECTION, EMULSION INTRAVENOUS at 05:02

## 2017-02-28 RX ADMIN — INSULIN ASPART 6 UNITS: 100 INJECTION, SOLUTION INTRAVENOUS; SUBCUTANEOUS at 05:02

## 2017-02-28 RX ADMIN — FAMOTIDINE 20 MG: 20 INJECTION, SOLUTION INTRAVENOUS at 08:02

## 2017-02-28 RX ADMIN — IPRATROPIUM BROMIDE AND ALBUTEROL SULFATE 3 ML: .5; 3 SOLUTION RESPIRATORY (INHALATION) at 07:02

## 2017-02-28 RX ADMIN — IPRATROPIUM BROMIDE AND ALBUTEROL SULFATE 3 ML: .5; 3 SOLUTION RESPIRATORY (INHALATION) at 12:02

## 2017-02-28 RX ADMIN — INSULIN ASPART 4 UNITS: 100 INJECTION, SOLUTION INTRAVENOUS; SUBCUTANEOUS at 11:02

## 2017-02-28 RX ADMIN — PROPOFOL 50 MCG/KG/MIN: 10 INJECTION, EMULSION INTRAVENOUS at 08:02

## 2017-02-28 RX ADMIN — IPRATROPIUM BROMIDE AND ALBUTEROL SULFATE 3 ML: .5; 3 SOLUTION RESPIRATORY (INHALATION) at 08:02

## 2017-02-28 RX ADMIN — FUROSEMIDE 60 MG: 10 INJECTION, SOLUTION INTRAMUSCULAR; INTRAVENOUS at 08:02

## 2017-02-28 RX ADMIN — PROPOFOL 50 MCG/KG/MIN: 10 INJECTION, EMULSION INTRAVENOUS at 11:02

## 2017-02-28 RX ADMIN — CHLORHEXIDINE GLUCONATE 15 ML: 1.2 RINSE ORAL at 08:02

## 2017-02-28 RX ADMIN — INSULIN ASPART 2 UNITS: 100 INJECTION, SOLUTION INTRAVENOUS; SUBCUTANEOUS at 01:02

## 2017-02-28 RX ADMIN — HYDRALAZINE HYDROCHLORIDE 10 MG: 20 INJECTION INTRAMUSCULAR; INTRAVENOUS at 09:02

## 2017-02-28 RX ADMIN — PROPOFOL 50 MCG/KG/MIN: 10 INJECTION, EMULSION INTRAVENOUS at 02:02

## 2017-02-28 RX ADMIN — PROPOFOL 50 MCG/KG/MIN: 10 INJECTION, EMULSION INTRAVENOUS at 09:02

## 2017-02-28 RX ADMIN — PROPOFOL 50 MCG/KG/MIN: 10 INJECTION, EMULSION INTRAVENOUS at 03:02

## 2017-02-28 RX ADMIN — ACETYLCYSTEINE 4 ML: 100 SOLUTION ORAL; RESPIRATORY (INHALATION) at 07:02

## 2017-02-28 RX ADMIN — POLYETHYLENE GLYCOL 3350 17 G: 17 POWDER, FOR SOLUTION ORAL at 11:02

## 2017-02-28 RX ADMIN — DIPHENHYDRAMINE HYDROCHLORIDE 25 MG: 12.5 SOLUTION ORAL at 05:02

## 2017-02-28 RX ADMIN — IPRATROPIUM BROMIDE AND ALBUTEROL SULFATE 3 ML: .5; 3 SOLUTION RESPIRATORY (INHALATION) at 01:02

## 2017-02-28 RX ADMIN — PROPOFOL 50 MCG/KG/MIN: 10 INJECTION, EMULSION INTRAVENOUS at 04:02

## 2017-02-28 RX ADMIN — PROPOFOL 50 MCG/KG/MIN: 10 INJECTION, EMULSION INTRAVENOUS at 01:02

## 2017-02-28 RX ADMIN — Medication 100 MCG: at 11:02

## 2017-02-28 RX ADMIN — PROPOFOL 50 MCG/KG/MIN: 10 INJECTION, EMULSION INTRAVENOUS at 07:02

## 2017-02-28 RX ADMIN — PROPOFOL 50 MCG/KG/MIN: 10 INJECTION, EMULSION INTRAVENOUS at 10:02

## 2017-02-28 RX ADMIN — LORAZEPAM 2 MG: 2 INJECTION, SOLUTION INTRAMUSCULAR; INTRAVENOUS at 08:02

## 2017-02-28 RX ADMIN — ACETYLCYSTEINE 4 ML: 100 SOLUTION ORAL; RESPIRATORY (INHALATION) at 08:02

## 2017-02-28 RX ADMIN — ENOXAPARIN SODIUM 40 MG: 100 INJECTION SUBCUTANEOUS at 11:02

## 2017-02-28 RX ADMIN — PIPERACILLIN SODIUM AND TAZOBACTAM SODIUM 4.5 G: 4; .5 INJECTION, POWDER, FOR SOLUTION INTRAVENOUS at 08:02

## 2017-02-28 RX ADMIN — PIPERACILLIN SODIUM AND TAZOBACTAM SODIUM 4.5 G: 4; .5 INJECTION, POWDER, FOR SOLUTION INTRAVENOUS at 04:02

## 2017-02-28 RX ADMIN — INSULIN DETEMIR 10 UNITS: 100 INJECTION, SOLUTION SUBCUTANEOUS at 11:02

## 2017-02-28 RX ADMIN — METHYLPREDNISOLONE SODIUM SUCCINATE 60 MG: 125 INJECTION, POWDER, FOR SOLUTION INTRAMUSCULAR; INTRAVENOUS at 08:02

## 2017-02-28 NOTE — PLAN OF CARE
Problem: Patient Care Overview  Goal: Plan of Care Review  Outcome: Ongoing (interventions implemented as appropriate)  VSS this shift.  Pt on big turn bed, skin intact.  Fentanyl & Propofol in progress to keep RASS at -2.  Soft wrist restraints in place with no injury noted.  Tube feedings in progress at 60 cc/hr. PCO2 this am, 56.9. Per RT, pt is compensated.

## 2017-02-28 NOTE — PROGRESS NOTES
[facility] Hospital Medicine  Progress Note    Patient Name: Matty Nunn  MRN: [id number]  Patient Class: IP- Inpatient   Admission Date: 2/22/2017  Length of Stay: 5 days  Attending Physician: Victorino Yu MD  Primary Care Provider: Ruddy Noble MD        Subjective:     Principal Problem:Acute respiratory failure with hypoxia    HPI:  Patient is a 35 yo male with a hx of gout presented to [facility] on 2/21/17 with a right axillary abscess which was I&D'd in the ER.  Patient was sent home on Bactrim.  He return to the ER today for increase difficulty breathing.  He was intubated in the ER, but self extubated himself.  He was then re intubated and sent to [facility] for further ICU care.      All history taken from records as pt has been intubated and sedated since prior to arrival.  Case d/w Dr. Mckinney ([facility] ER attending) who states all hx was taken from the pt prior to intubation.  Patient has a notable breath ceci on his left leg.      Hospital Course:       Interval History:  No acute problems or events and able to decrease supplemental oxygen partially.    Review of Systems   Unable to perform ROS: Intubated     Objective:     Vital Signs (Most Recent):  Temp: 98.9 °F (37.2 °C) (02/27/17 1905)  Pulse: 78 (02/27/17 2030)  Resp: 15 (02/27/17 2030)  BP: 138/65 (02/27/17 2030)  SpO2: (!) 93 % (02/27/17 2030) Vital Signs (24h Range):  Temp:  [98.5 °F (36.9 °C)-99.5 °F (37.5 °C)] 98.9 °F (37.2 °C)  Pulse:  [] 78  Resp:  [13-27] 15  SpO2:  [89 %-99 %] 93 %  BP: (117-153)/(45-87) 138/65     Weight: (!) 167.5 kg (369 lb 4.3 oz)  Body mass index is 48.72 kg/(m^2).    Intake/Output Summary (Last 24 hours) at 02/27/17 2130  Last data filed at 02/27/17 2000   Gross per 24 hour   Intake          2205.48 ml   Output             4185 ml   Net         -1979.52 ml      Physical Exam   Constitutional: He appears well-developed and well-nourished. No distress.   HENT:   Head:  Normocephalic and atraumatic.   Mouth/Throat: Oropharynx is clear and moist.   Eyes: Conjunctivae and EOM are normal. Pupils are equal, round, and reactive to light.   Neck: No JVD present. No thyromegaly present.   Cardiovascular: Normal rate, regular rhythm and normal heart sounds.  Exam reveals no gallop and no friction rub.    No murmur heard.  Pulmonary/Chest: Effort normal and breath sounds normal. No respiratory distress. He has no wheezes. He has no rales.   Abdominal: Soft. Bowel sounds are normal. He exhibits no distension. There is no tenderness. There is no rebound and no guarding.   Musculoskeletal: Normal range of motion. He exhibits no edema or tenderness.   Lymphadenopathy:     He has no cervical adenopathy.   Neurological: He has normal reflexes. He displays normal reflexes. No cranial nerve deficit.   Intubated and sedated   Skin: Skin is warm and dry. No rash noted. He is not diaphoretic. No erythema.   Small left anterior axillary wound.  Dressed clean and intact.  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       Significant Labs:   CBC:     Recent Labs  Lab 02/26/17  1050 02/27/17  0322   WBC 10.52 12.89*   HGB 13.3* 12.9*   HCT 38.9* 38.0*    242     CMP:     Recent Labs  Lab 02/26/17  1050 02/27/17  0322    138   K 4.4 4.4   CL 99 98   CO2 31* 29   * 289*   BUN 33* 31*   CREATININE 0.9 1.1   CALCIUM 9.1 8.9   PROT 6.8 6.7   ALBUMIN 3.2* 3.2*   BILITOT 0.6 0.5   ALKPHOS 38* 41*   AST 40 44*   ALT 71* 106*   ANIONGAP 8 11   EGFRNONAA >60 >60       Significant Imaging: I have reviewed all pertinent imaging results/findings within the past 24 hours.    Assessment/Plan:      * Acute respiratory failure with hypoxia  Increasing difficulty with oxygenation requiring 100% FiO2 and increasing PEEP.  CT chest negative for embolism but segmental consolidation bilaterally in the bases worse on the right.  Receiving chest physiotherapy with nebulizer treatments for possible mucus  plugging.  Able to decrease FiO2 partially.    Allergic reaction  Associated with Bactrim initiation.  He received Steroids and anti-histamines.  No epinephrine.  Intubated for airway protection.  No associated rash.    Abscess of axilla, right  Initially prescribed Bactrim now on Clindamycin.    Anaphylaxis  Associated with staring Bactrim and presumed allergic response.  Intubated for airway protection.    Pneumonia of both lower lobes due to infectious organism  CXR and CTA suggest lower lobe consolidation/atelectasis.  Patient started on Zosyn and Clindamycin.  Clindamycin will cover axillary abscess and MRSA.    Hyperglycemia, drug-induced  Accuchecks and SSI, related to steroids.    VTE Risk Mitigation         Ordered     enoxaparin injection 40 mg  Daily     Route:  Subcutaneous        02/22/17 2146     Medium Risk of VTE  Once      02/22/17 1802     Place GETACHEW hose  Until discontinued      02/22/17 1802     Place sequential compression device  Until discontinued      02/22/17 1802          Victorino Yu MD  Department of Hospital Medicine   [facility]    Critical care time:  30 minutes

## 2017-02-28 NOTE — NURSING TRANSFER
Nursing Transfer Note      2/28/2017     Transfer to CT     Transfer via ICU bed    Transfer with IV pump, Oxygen, portable monitor, portable ventilator    Transported by DISHA Roque RN, Avis RN, Great RT    Patient tolerated well, VSS throughout CT    Arrival back to ICU at 1314

## 2017-02-28 NOTE — PLAN OF CARE
Problem: Patient Care Overview  Goal: Plan of Care Review  Outcome: Ongoing (interventions implemented as appropriate)  Patient continues in ICU ventilated and sedated with VSS on continuous monitor. Attempts to wean ventilator per DISHA Reyes NP and RT ongoing. CT scan of chest completed today to monitor respiratory status. Antibiotic therapy continued, breathing treatments continued, frequent repositioning continued. Patient tolerating tube feeds, urine output good. POC discussed with patient's wife and mother. Will continue to monitor.

## 2017-02-28 NOTE — SUBJECTIVE & OBJECTIVE
Interval History:  No acute problems or events and able to decrease supplemental oxygen partially.    Review of Systems   Unable to perform ROS: Intubated     Objective:     Vital Signs (Most Recent):  Temp: 98.9 °F (37.2 °C) (02/27/17 1905)  Pulse: 78 (02/27/17 2030)  Resp: 15 (02/27/17 2030)  BP: 138/65 (02/27/17 2030)  SpO2: (!) 93 % (02/27/17 2030) Vital Signs (24h Range):  Temp:  [98.5 °F (36.9 °C)-99.5 °F (37.5 °C)] 98.9 °F (37.2 °C)  Pulse:  [] 78  Resp:  [13-27] 15  SpO2:  [89 %-99 %] 93 %  BP: (117-153)/(45-87) 138/65     Weight: (!) 167.5 kg (369 lb 4.3 oz)  Body mass index is 48.72 kg/(m^2).    Intake/Output Summary (Last 24 hours) at 02/27/17 2130  Last data filed at 02/27/17 2000   Gross per 24 hour   Intake          2205.48 ml   Output             4185 ml   Net         -1979.52 ml      Physical Exam   Constitutional: He appears well-developed and well-nourished. No distress.   HENT:   Head: Normocephalic and atraumatic.   Mouth/Throat: Oropharynx is clear and moist.   Eyes: Conjunctivae and EOM are normal. Pupils are equal, round, and reactive to light.   Neck: No JVD present. No thyromegaly present.   Cardiovascular: Normal rate, regular rhythm and normal heart sounds.  Exam reveals no gallop and no friction rub.    No murmur heard.  Pulmonary/Chest: Effort normal and breath sounds normal. No respiratory distress. He has no wheezes. He has no rales.   Abdominal: Soft. Bowel sounds are normal. He exhibits no distension. There is no tenderness. There is no rebound and no guarding.   Musculoskeletal: Normal range of motion. He exhibits no edema or tenderness.   Lymphadenopathy:     He has no cervical adenopathy.   Neurological: He has normal reflexes. He displays normal reflexes. No cranial nerve deficit.   Intubated and sedated   Skin: Skin is warm and dry. No rash noted. He is not diaphoretic. No erythema.   Small left anterior axillary wound.  Dressed clean and intact.  Port-wine lesion over  left foot and ankle.       Significant Labs:   CBC:     Recent Labs  Lab 02/26/17  1050 02/27/17  0322   WBC 10.52 12.89*   HGB 13.3* 12.9*   HCT 38.9* 38.0*    242     CMP:     Recent Labs  Lab 02/26/17  1050 02/27/17  0322    138   K 4.4 4.4   CL 99 98   CO2 31* 29   * 289*   BUN 33* 31*   CREATININE 0.9 1.1   CALCIUM 9.1 8.9   PROT 6.8 6.7   ALBUMIN 3.2* 3.2*   BILITOT 0.6 0.5   ALKPHOS 38* 41*   AST 40 44*   ALT 71* 106*   ANIONGAP 8 11   EGFRNONAA >60 >60       Significant Imaging: I have reviewed all pertinent imaging results/findings within the past 24 hours.

## 2017-02-28 NOTE — PROGRESS NOTES
Progress Note  Critical Care    Admit Date: 2/22/2017   LOS: 6 days     Follow-up For: Resp Failure and PNA     SUBJECTIVE:   Change over last 24 hours: Still high O2 demand.  Devi TF    ROS:  Review of Systems   Unable to perform ROS: Intubated       Continuous Infusions:   fentanyl 7.5 mL/hr at 02/28/17 1200    propofol 50 mcg/kg/min (02/28/17 1200)     Review of patient's allergies indicates:   Allergen Reactions    Bactrim [sulfamethoxazole-trimethoprim] Swelling    Rifamycin analogues        OBJECTIVE:     Vital Signs (Most Recent)  Temp: 98.7 °F (37.1 °C) (02/28/17 1100)  Pulse: 73 (02/28/17 1200)  Resp: 18 (02/28/17 1200)  BP: (!) 114/56 (02/28/17 1200)  SpO2: (!) 90 % (02/28/17 1200)    Vital Signs Range (Last 24H):  Temp:  [98.5 °F (36.9 °C)-99 °F (37.2 °C)]   Pulse:  [65-86]   Resp:  [12-19]   BP: (114-140)/(50-72)   SpO2:  [89 %-93 %]     I & O (Last 24H):  Intake/Output Summary (Last 24 hours) at 02/28/17 1212  Last data filed at 02/28/17 1200   Gross per 24 hour   Intake          3107.18 ml   Output             4390 ml   Net         -1282.82 ml       Ventilator Data (Last 24H):     Vent Mode: A/C  Oxygen Concentration (%):  [70] 70  Resp Rate Total:  [15 br/min-22 br/min] 16 br/min  Vt Set:  [0 mL] 0 mL  PEEP/CPAP:  [12 cmH20-15 cmH20] 15 cmH20  Pressure Support:  [0 cmH20] 0 cmH20  Mean Airway Pressure:  [15 miP40-91 cmH20] 19 cmH20    Physical Exam:  Physical Exam   Constitutional: He is well-developed, well-nourished, and in no distress. Vital signs are normal. He appears lethargic (sedated). He appears to not be writhing in pain and not malnourished. He appears unhealthy. He has a sickly appearance. No distress. He is intubated.   HENT:   Head: Normocephalic and atraumatic.   Nose: Nose normal.       Mouth/Throat: Oropharynx is clear and moist.   Eyes: Lids are normal. Pupils are equal, round, and reactive to light.   Neck: Neck supple.   Cardiovascular: Normal rate and regular rhythm.     Pulses:       Radial pulses are 2+ on the right side, and 2+ on the left side.        Dorsalis pedis pulses are 2+ on the right side, and 2+ on the left side.   Pulmonary/Chest: No accessory muscle usage. He is intubated. No respiratory distress. He has decreased breath sounds.   Abdominal: Soft. Normal appearance. He exhibits no distension. Bowel sounds are hypoactive. There is no tenderness.   Genitourinary:   Genitourinary Comments: Mcgill    Musculoskeletal:   + 1 edema   Lymphadenopathy:     He has no cervical adenopathy.   Neurological: He appears lethargic (sedated).   sedated   Skin: Skin is warm, dry and intact. He is not diaphoretic. No cyanosis.       Laboratory (Last 24H):  CBC:    Recent Labs  Lab 02/28/17  0407   WBC 10.39   HGB 13.7*   HCT 40.1        CMP:    Recent Labs  Lab 02/28/17  0407   CALCIUM 9.0   ALBUMIN 3.3*   PROT 7.1      K 4.4   CO2 34*   CL 95   BUN 39*   CREATININE 1.1   ALKPHOS 41*   *   AST 70*   BILITOT 0.9       ABGs:   Recent Labs  Lab 02/28/17  0432   PH 7.436   PCO2 56.9*   HCO3 38.3*   POCSATURATED 97   BE 14     Microbiology Results (last 7 days)     Procedure Component Value Units Date/Time    Blood Culture #1 **CANNOT BE ORDERED STAT** [[id number]] Collected:  02/22/17 1030    Order Status:  Completed Specimen:  Blood from Peripheral, Hand, Right Updated:  02/27/17 2212     Blood Culture, Routine No growth after 5 days.    Blood Culture #2 **CANNOT BE ORDERED STAT** [[id number]] Collected:  02/22/17 1045    Order Status:  Completed Specimen:  Blood from Peripheral, Hand, Right Updated:  02/27/17 2212     Blood Culture, Routine No growth after 5 days.    Culture, Respiratory with Gram Stain [[id number]] Collected:  02/23/17 1647    Order Status:  Completed Specimen:  Respiratory from Sputum, Induced Updated:  02/27/17 1140     Respiratory Culture Normal respiratory roxana     Gram Stain (Respiratory) >10 epithelial cells per low power field     Gram Stain  (Respiratory) Many WBC's     Gram Stain (Respiratory) Many Gram negative rods     Gram Stain (Respiratory) Few Gram positive cocci     Gram Stain (Respiratory) Rare Gram negative diplococci    Narrative:       Endotracheal tube suction    Aerobic culture [[id number]] Collected:  02/24/17 0933    Order Status:  Sent Specimen:  Wound from Arm, Right Updated:  02/24/17 0933    Narrative:       Right axilla          Chest X-Ray:         Film and report reviewed:  Improved aeration in the left lower lobe.  Subsegmental atelectasis in the right lower lobe, infrahilar location has increased.  Stable position of the supporting tubes. No pneumothorax.    ASSESSMENT/PLAN:     Problem   Acute Respiratory Failure With Hypoxia   Pneumonia Due to Gram-Negative Bacteria   Hyperglycemia, Drug-Induced   Allergic Reaction   Anaphylaxis   Abscess of Axilla, Right       PLAN:    1. Neuro: ICU neuro monitoring. Very agitated with any sedation weaning will attempt sedation vacation post CT chest      2. Pulmonary: Vent settings reviewed and adjusted. Repeat CT chest pending.  Cont Duoneb, MM nebs, OET suctioning and IVAB. Wean FiO2 as tolerated. Daily CXR.  Passed OET min leak test will stop steroids.      3. Cardiac: ICU Cardiac monitoring. Cont Lasix      4. Renal: Mcgill in place, monitor I/Os       5. Infectious Disease: Afeb w/ nml WBC. Sputum + GNR with ID&S still pending after 4 days. Blood cultures Neg. Axilla abscess healing and no drainage. Cont Zosyn      6. Hematology/Oncology: monitor for bleeding      7. Endocrine: Monitor glucose and cont SSI. Stop steroids and add Detemir      8. Fluids/Electrolytes/Nutrition/GI: tolerating TF and cont Miralax      9. Musculoskeletal: ROM      10. Pain Management: Fentanyl infusion      11. Discharge and Palliative Care: Plan home with family      Preventive Measures and Monitoring:   Stress Ulcer: Pepcid  Nutrition: TF  Glucose control: SSI  Bowel prophylaxis: prn Dulcolax  DVT  prophylaxis: LMWH/SCDs  Hx CAD on B-Blocker: no hx CAD  Head of Bed/Reposition: Elevate HOB and turn Q1-2 hours   Early Mobility: OOB once off sedation  SAT/SBT: daily once O2 demand improves  RASS goal: -2  Vent Day: #6  NG Day: #6  Mcgill Day: #6  IVAB Day: #6  Code Status: Full    Counseling/Consultation: I have discussed the care of this patient in detail with multidisciplinary team during rounds, bedside nursing staff and Dr. rOtega and Dr. Yu    Critical Care Time greater than: 50 minutes    Critical care was time spent personally by me on the following activities: development of treatment plan with patient or surrogate and bedside caregivers, discussions with consultants, evaluation of patient's response to treatment, examination of patient, ordering and performing treatments and interventions, ordering and review of laboratory studies, ordering and review of radiographic studies, pulse oximetry, re-evaluation of patient's condition.  This critical care time did not overlap with that of any other provider or involve time of any procedures.    SWAPNA Reyes [facility]  [facility]

## 2017-03-01 PROBLEM — L02.411 ABSCESS OF AXILLA, RIGHT: Status: RESOLVED | Noted: 2017-02-22 | Resolved: 2017-03-01

## 2017-03-01 PROBLEM — T78.40XA ALLERGIC REACTION: Status: RESOLVED | Noted: 2017-02-22 | Resolved: 2017-03-01

## 2017-03-01 LAB
ALBUMIN SERPL BCP-MCNC: 3.6 G/DL
ALLENS TEST: ABNORMAL
ALP SERPL-CCNC: 41 U/L
ALT SERPL W/O P-5'-P-CCNC: 239 U/L
ANION GAP SERPL CALC-SCNC: 14 MMOL/L
ANISOCYTOSIS BLD QL SMEAR: SLIGHT
AST SERPL-CCNC: 72 U/L
BASOPHILS NFR BLD: 0 %
BILIRUB SERPL-MCNC: 0.8 MG/DL
BUN SERPL-MCNC: 43 MG/DL
CALCIUM SERPL-MCNC: 9.6 MG/DL
CHLORIDE SERPL-SCNC: 94 MMOL/L
CO2 SERPL-SCNC: 36 MMOL/L
CREAT SERPL-MCNC: 1.2 MG/DL
DELSYS: ABNORMAL
DIFFERENTIAL METHOD: ABNORMAL
EOSINOPHIL NFR BLD: 1 %
ERYTHROCYTE [DISTWIDTH] IN BLOOD BY AUTOMATED COUNT: 13.5 %
ERYTHROCYTE [SEDIMENTATION RATE] IN BLOOD BY WESTERGREN METHOD: 12 MM/H
EST. GFR  (AFRICAN AMERICAN): >60 ML/MIN/1.73 M^2
EST. GFR  (NON AFRICAN AMERICAN): >60 ML/MIN/1.73 M^2
FIO2: 100
GLUCOSE SERPL-MCNC: 171 MG/DL
HCO3 UR-SCNC: 44.5 MMOL/L (ref 24–28)
HCT VFR BLD AUTO: 44 %
HGB BLD-MCNC: 14.7 G/DL
LYMPHOCYTES NFR BLD: 18 %
MCH RBC QN AUTO: 31.3 PG
MCHC RBC AUTO-ENTMCNC: 33.4 %
MCV RBC AUTO: 94 FL
METAMYELOCYTES NFR BLD MANUAL: 3 %
MODE: ABNORMAL
MONOCYTES NFR BLD: 5 %
MYELOCYTES NFR BLD MANUAL: 3 %
NEUTROPHILS NFR BLD: 70 %
PCO2 BLDA: 58.8 MMHG (ref 35–45)
PEEP: 10
PH SMN: 7.49 [PH] (ref 7.35–7.45)
PLATELET # BLD AUTO: 227 K/UL
PLATELET BLD QL SMEAR: ABNORMAL
PMV BLD AUTO: 8.9 FL
PO2 BLDA: 130 MMHG (ref 80–100)
POC BE: 21 MMOL/L
POC SATURATED O2: 99 % (ref 95–100)
POCT GLUCOSE: 156 MG/DL (ref 70–110)
POCT GLUCOSE: 156 MG/DL (ref 70–110)
POCT GLUCOSE: 180 MG/DL (ref 70–110)
POCT GLUCOSE: 210 MG/DL (ref 70–110)
POLYCHROMASIA BLD QL SMEAR: ABNORMAL
POTASSIUM SERPL-SCNC: 3.5 MMOL/L
PROT SERPL-MCNC: 7.7 G/DL
RBC # BLD AUTO: 4.7 M/UL
SAMPLE: ABNORMAL
SITE: ABNORMAL
SODIUM SERPL-SCNC: 144 MMOL/L
SP02: 93
VT: 750
WBC # BLD AUTO: 12.2 K/UL

## 2017-03-01 PROCEDURE — 87040 BLOOD CULTURE FOR BACTERIA: CPT

## 2017-03-01 PROCEDURE — 99291 CRITICAL CARE FIRST HOUR: CPT | Mod: ,,, | Performed by: NURSE PRACTITIONER

## 2017-03-01 PROCEDURE — 63600175 PHARM REV CODE 636 W HCPCS: Performed by: INTERNAL MEDICINE

## 2017-03-01 PROCEDURE — 36600 WITHDRAWAL OF ARTERIAL BLOOD: CPT

## 2017-03-01 PROCEDURE — 94640 AIRWAY INHALATION TREATMENT: CPT

## 2017-03-01 PROCEDURE — 99900035 HC TECH TIME PER 15 MIN (STAT)

## 2017-03-01 PROCEDURE — 25000242 PHARM REV CODE 250 ALT 637 W/ HCPCS: Performed by: INTERNAL MEDICINE

## 2017-03-01 PROCEDURE — 94668 MNPJ CHEST WALL SBSQ: CPT

## 2017-03-01 PROCEDURE — 94003 VENT MGMT INPAT SUBQ DAY: CPT

## 2017-03-01 PROCEDURE — 82803 BLOOD GASES ANY COMBINATION: CPT

## 2017-03-01 PROCEDURE — 25000003 PHARM REV CODE 250: Performed by: NURSE PRACTITIONER

## 2017-03-01 PROCEDURE — 63600175 PHARM REV CODE 636 W HCPCS: Performed by: NURSE PRACTITIONER

## 2017-03-01 PROCEDURE — 86592 SYPHILIS TEST NON-TREP QUAL: CPT

## 2017-03-01 PROCEDURE — 36415 COLL VENOUS BLD VENIPUNCTURE: CPT

## 2017-03-01 PROCEDURE — 80053 COMPREHEN METABOLIC PANEL: CPT

## 2017-03-01 PROCEDURE — 85007 BL SMEAR W/DIFF WBC COUNT: CPT

## 2017-03-01 PROCEDURE — 20000000 HC ICU ROOM

## 2017-03-01 PROCEDURE — 25000003 PHARM REV CODE 250: Performed by: INTERNAL MEDICINE

## 2017-03-01 PROCEDURE — 85027 COMPLETE CBC AUTOMATED: CPT

## 2017-03-01 RX ORDER — HYDRALAZINE HYDROCHLORIDE 20 MG/ML
20 INJECTION INTRAMUSCULAR; INTRAVENOUS EVERY 4 HOURS PRN
Status: DISCONTINUED | OUTPATIENT
Start: 2017-03-01 | End: 2017-03-08 | Stop reason: HOSPADM

## 2017-03-01 RX ORDER — METOPROLOL TARTRATE 1 MG/ML
5 INJECTION, SOLUTION INTRAVENOUS EVERY 6 HOURS
Status: DISCONTINUED | OUTPATIENT
Start: 2017-03-01 | End: 2017-03-02

## 2017-03-01 RX ORDER — LORAZEPAM 2 MG/ML
1 INJECTION INTRAMUSCULAR EVERY 4 HOURS PRN
Status: DISCONTINUED | OUTPATIENT
Start: 2017-03-01 | End: 2017-03-08 | Stop reason: HOSPADM

## 2017-03-01 RX ORDER — METOCLOPRAMIDE HYDROCHLORIDE 5 MG/ML
10 INJECTION INTRAMUSCULAR; INTRAVENOUS EVERY 8 HOURS
Status: DISCONTINUED | OUTPATIENT
Start: 2017-03-01 | End: 2017-03-03

## 2017-03-01 RX ORDER — FUROSEMIDE 10 MG/ML
40 INJECTION INTRAMUSCULAR; INTRAVENOUS DAILY
Status: DISCONTINUED | OUTPATIENT
Start: 2017-03-02 | End: 2017-03-05

## 2017-03-01 RX ORDER — NICARDIPINE HYDROCHLORIDE 0.2 MG/ML
1 INJECTION INTRAVENOUS CONTINUOUS
Status: DISCONTINUED | OUTPATIENT
Start: 2017-03-02 | End: 2017-03-03

## 2017-03-01 RX ADMIN — INSULIN ASPART 4 UNITS: 100 INJECTION, SOLUTION INTRAVENOUS; SUBCUTANEOUS at 06:03

## 2017-03-01 RX ADMIN — PIPERACILLIN SODIUM AND TAZOBACTAM SODIUM 4.5 G: 4; .5 INJECTION, POWDER, FOR SOLUTION INTRAVENOUS at 08:03

## 2017-03-01 RX ADMIN — IPRATROPIUM BROMIDE AND ALBUTEROL SULFATE 3 ML: .5; 3 SOLUTION RESPIRATORY (INHALATION) at 07:03

## 2017-03-01 RX ADMIN — PIPERACILLIN SODIUM AND TAZOBACTAM SODIUM 4.5 G: 4; .5 INJECTION, POWDER, FOR SOLUTION INTRAVENOUS at 05:03

## 2017-03-01 RX ADMIN — PROPOFOL 40 MCG/KG/MIN: 10 INJECTION, EMULSION INTRAVENOUS at 10:03

## 2017-03-01 RX ADMIN — IPRATROPIUM BROMIDE AND ALBUTEROL SULFATE 3 ML: .5; 3 SOLUTION RESPIRATORY (INHALATION) at 12:03

## 2017-03-01 RX ADMIN — INSULIN ASPART 2 UNITS: 100 INJECTION, SOLUTION INTRAVENOUS; SUBCUTANEOUS at 06:03

## 2017-03-01 RX ADMIN — FAMOTIDINE 20 MG: 20 INJECTION, SOLUTION INTRAVENOUS at 09:03

## 2017-03-01 RX ADMIN — FAMOTIDINE 20 MG: 20 INJECTION, SOLUTION INTRAVENOUS at 08:03

## 2017-03-01 RX ADMIN — PIPERACILLIN SODIUM AND TAZOBACTAM SODIUM 4.5 G: 4; .5 INJECTION, POWDER, FOR SOLUTION INTRAVENOUS at 12:03

## 2017-03-01 RX ADMIN — ACETYLCYSTEINE 4 ML: 100 SOLUTION ORAL; RESPIRATORY (INHALATION) at 07:03

## 2017-03-01 RX ADMIN — METOPROLOL TARTRATE 5 MG: 5 INJECTION INTRAVENOUS at 11:03

## 2017-03-01 RX ADMIN — CHLORHEXIDINE GLUCONATE 15 ML: 1.2 RINSE ORAL at 08:03

## 2017-03-01 RX ADMIN — FUROSEMIDE 60 MG: 10 INJECTION, SOLUTION INTRAMUSCULAR; INTRAVENOUS at 08:03

## 2017-03-01 RX ADMIN — HYDRALAZINE HYDROCHLORIDE 10 MG: 20 INJECTION INTRAMUSCULAR; INTRAVENOUS at 05:03

## 2017-03-01 RX ADMIN — BISACODYL 10 MG: 10 SUPPOSITORY RECTAL at 12:03

## 2017-03-01 RX ADMIN — HYDRALAZINE HYDROCHLORIDE 20 MG: 20 INJECTION INTRAMUSCULAR; INTRAVENOUS at 09:03

## 2017-03-01 RX ADMIN — METOCLOPRAMIDE 10 MG: 5 INJECTION, SOLUTION INTRAMUSCULAR; INTRAVENOUS at 02:03

## 2017-03-01 RX ADMIN — METOCLOPRAMIDE 10 MG: 5 INJECTION, SOLUTION INTRAMUSCULAR; INTRAVENOUS at 09:03

## 2017-03-01 RX ADMIN — METOPROLOL TARTRATE 5 MG: 5 INJECTION INTRAVENOUS at 06:03

## 2017-03-01 RX ADMIN — POLYETHYLENE GLYCOL 3350 17 G: 17 POWDER, FOR SOLUTION ORAL at 08:03

## 2017-03-01 RX ADMIN — LORAZEPAM 1 MG: 2 INJECTION, SOLUTION INTRAMUSCULAR; INTRAVENOUS at 09:03

## 2017-03-01 RX ADMIN — PROPOFOL 50 MCG/KG/MIN: 10 INJECTION, EMULSION INTRAVENOUS at 05:03

## 2017-03-01 RX ADMIN — PROPOFOL 45 MCG/KG/MIN: 10 INJECTION, EMULSION INTRAVENOUS at 08:03

## 2017-03-01 RX ADMIN — NICARDIPINE HYDROCHLORIDE 2.5 MG/HR: 0.2 INJECTION, SOLUTION INTRAVENOUS at 11:03

## 2017-03-01 RX ADMIN — PROPOFOL 50 MCG/KG/MIN: 10 INJECTION, EMULSION INTRAVENOUS at 03:03

## 2017-03-01 RX ADMIN — INSULIN ASPART 1 UNITS: 100 INJECTION, SOLUTION INTRAVENOUS; SUBCUTANEOUS at 12:03

## 2017-03-01 RX ADMIN — ENOXAPARIN SODIUM 40 MG: 100 INJECTION SUBCUTANEOUS at 12:03

## 2017-03-01 NOTE — PLAN OF CARE
Problem: Patient Care Overview  Goal: Plan of Care Review  Outcome: Ongoing (interventions implemented as appropriate)  Patient remains on ventilator, slowly decreasing fio2 as tolerated. After ABG, slowly decreased fio2 due to spo2 of 99%. Small amount of white secretions. Will follow.

## 2017-03-01 NOTE — ASSESSMENT & PLAN NOTE
CXR and CTA suggest lower lobe consolidation/atelectasis.  Patient started on Zosyn and Clindamycin now just Zosyn.  Clindamycin will cover axillary abscess and possible MRSA.

## 2017-03-01 NOTE — PROGRESS NOTES
Progress Note  Critical Care    Admit Date: 2/22/2017   LOS: 7 days     Follow-up For: Resp Failure     SUBJECTIVE:   Change over last 24 hours: Still high O2 demand.  TF noted in Oralpharynx but not with OET suctioning.  Dec bowel sounds and no BM.      ROS:  Review of Systems   Unable to perform ROS: Intubated       Continuous Infusions:   fentanyl 12.5 mL/hr at 03/01/17 1100    propofol 45 mcg/kg/min (03/01/17 0804)     Review of patient's allergies indicates:   Allergen Reactions    Bactrim [sulfamethoxazole-trimethoprim] Swelling    Rifamycin analogues        OBJECTIVE:     Vital Signs (Most Recent)  Temp: 99.4 °F (37.4 °C) (03/01/17 0706)  Pulse: 81 (03/01/17 1212)  Resp: 16 (03/01/17 1212)  BP: 132/62 (03/01/17 1130)  SpO2: (!) 91 % (03/01/17 1212)    Vital Signs Range (Last 24H):  Temp:  [98.7 °F (37.1 °C)-99.4 °F (37.4 °C)]   Pulse:  []   Resp:  [10-19]   BP: (129-173)/(58-92)   SpO2:  [89 %-95 %]     I & O (Last 24H):  Intake/Output Summary (Last 24 hours) at 03/01/17 1222  Last data filed at 03/01/17 1100   Gross per 24 hour   Intake             2537 ml   Output             4925 ml   Net            -2388 ml       Ventilator Data (Last 24H):     Vent Mode: A/C  Oxygen Concentration (%):  [] 60  Resp Rate Total:  [12 br/min-22 br/min] 16 br/min  Vt Set:  [0 mL-750 mL] 750 mL  PEEP/CPAP:  [10 cmH20-15 cmH20] 10 cmH20  Pressure Support:  [0 cmH20] 0 cmH20  Mean Airway Pressure:  [14 suL83-55 cmH20] 16 cmH20    Physical Exam:  Physical Exam   Constitutional: He is well-developed, well-nourished, and in no distress. Vital signs are normal. He appears lethargic (sedated). He appears to not be writhing in pain and not malnourished. He appears unhealthy.  Non-toxic appearance. No distress. He is intubated.   HENT:   Head: Normocephalic and atraumatic.   Mouth/Throat: Oropharynx is clear and moist.   Eyes: Pupils are equal, round, and reactive to light.   Neck: Neck supple. Carotid bruit is not  present.   Cardiovascular: Normal rate and regular rhythm.    Pulses:       Radial pulses are 2+ on the right side, and 2+ on the left side.        Dorsalis pedis pulses are 2+ on the right side, and 2+ on the left side.   Pulmonary/Chest: No accessory muscle usage. He is intubated. No respiratory distress. He has decreased breath sounds. He has rales in the right lower field and the left lower field.   Abdominal: Soft. He exhibits no distension. Bowel sounds are hypoactive (rare). There is no tenderness.   Genitourinary: Penis normal.   Genitourinary Comments: Mcgill    Musculoskeletal: Normal range of motion.   Mild edema   Lymphadenopathy:     He has no cervical adenopathy.   Neurological: He appears lethargic (sedated).   sedated   Skin: Skin is warm, dry and intact. Rash (mild and mostly to prox RLE and right trunk) noted. Rash is macular and papular. He is not diaphoretic. No cyanosis.       Laboratory (Last 24H):  CBC:    Recent Labs  Lab 03/01/17  0436   WBC 12.20   HGB 14.7   HCT 44.0        CMP:    Recent Labs  Lab 03/01/17  0436   CALCIUM 9.6   ALBUMIN 3.6   PROT 7.7      K 3.5   CO2 36*   CL 94*   BUN 43*   CREATININE 1.2   ALKPHOS 41*   *   AST 72*   BILITOT 0.8       ABGs:   Recent Labs  Lab 03/01/17  0428   PH 7.487*   PCO2 58.8*   HCO3 44.5*   POCSATURATED 99   BE 21     Microbiology Results (last 7 days)     Procedure Component Value Units Date/Time    Blood culture [[id number]] Collected:  03/01/17 0923    Order Status:  Sent Specimen:  Blood Updated:  03/01/17 0924    Blood Culture #1 **CANNOT BE ORDERED STAT** [[id number]] Collected:  02/22/17 1030    Order Status:  Completed Specimen:  Blood from Peripheral, Hand, Right Updated:  02/27/17 2212     Blood Culture, Routine No growth after 5 days.    Blood Culture #2 **CANNOT BE ORDERED STAT** [[id number]] Collected:  02/22/17 1045    Order Status:  Completed Specimen:  Blood from Peripheral, Hand, Right Updated:  02/27/17 2212      Blood Culture, Routine No growth after 5 days.    Culture, Respiratory with Gram Stain [[id number]] Collected:  02/23/17 1647    Order Status:  Completed Specimen:  Respiratory from Sputum, Induced Updated:  02/27/17 1140     Respiratory Culture Normal respiratory roxana     Gram Stain (Respiratory) >10 epithelial cells per low power field     Gram Stain (Respiratory) Many WBC's     Gram Stain (Respiratory) Many Gram negative rods     Gram Stain (Respiratory) Few Gram positive cocci     Gram Stain (Respiratory) Rare Gram negative diplococci    Narrative:       Endotracheal tube suction    Aerobic culture [[id number]] Collected:  02/24/17 0933    Order Status:  Sent Specimen:  Wound from Arm, Right Updated:  02/24/17 0933    Narrative:       Right axilla          Chest X-Ray:         Film and report reviewed:  Heart size is unchanged with ET tube and NG tube again noted. Lungs show improvement with better aeration bilaterally and significant decrease in the bilateral atelectatic changes and or infiltrates.     ASSESSMENT/PLAN:     Problem   Acute Respiratory Failure With Hypoxia   Pneumonia Due to Gram-Negative Bacteria   Hyperglycemia, Drug-Induced   Anaphylaxis   Allergic Reaction (Resolved)   Abscess of Axilla, Right (Resolved)       PLAN:    1. Neuro: ICU neuro monitoring. Wean sedation      2. Pulmonary: Vent settings reviewed and adjusted. Repeat CT chest yesterday unchanged. Cont Duoneb, MM nebs, OET suctioning and IVAB. Wean FiO2 as tolerated. Daily CXR. Passed OET min leak test now off steroids.      3. Cardiac: ICU Cardiac monitoring. Cont Lasix      4. Renal: Mcgill in place, monitor I/Os.  Decrease Lasix      5. Infectious Disease: Afeb w/ nml WBC. Sputum + GNR with ID&S still pending after 5 days. Blood cultures Neg. Axilla abscess healing and no drainage. Cont Zosyn for 7 days then stop.      6. Hematology/Oncology: monitor for bleeding      7. Endocrine: Monitor glucose and cont SSI and Detemir.  Off  steroids      8. Fluids/Electrolytes/Nutrition/GI: hold TF.  Cont Miralax.  Start Reglan and give Dulcolax LA.  If still no BM tomorrow will give MgCitrate vs Golyghtly.  Repeat Triglycerides in AM.       9. Musculoskeletal: ROM      10. Pain Management: Fentanyl infusion      11. Discharge and Palliative Care: Plan home with family      Preventive Measures and Monitoring:   Stress Ulcer: Pepcid  Nutrition: TF  Glucose control: SSI  Bowel prophylaxis: prn Dulcolax  DVT prophylaxis: LMWH/SCDs  Hx CAD on B-Blocker: no hx CAD  Head of Bed/Reposition: Elevate HOB and turn Q1-2 hours   Early Mobility: OOB once off sedation  SAT/SBT: attempt today with SAT  RASS goal: -2  Vent Day: #7  NG Day: #7  Mcgill Day: #7  IVAB Day: #7  Code Status: Full    Counseling/Consultation: I have discussed the care of this patient in detail with multidisciplinary team during rounds, bedside nursing staff and Dr. Ortega and Dr. Hargrove.  Discussed care in detail with spouse and mother at bedside and all questions answered.     Critical Care Time greater than: 53 minutes    Critical care was time spent personally by me on the following activities: development of treatment plan with patient or surrogate and bedside caregivers, discussions with consultants, evaluation of patient's response to treatment, examination of patient, ordering and performing treatments and interventions, ordering and review of laboratory studies, ordering and review of radiographic studies, pulse oximetry, re-evaluation of patient's condition.  This critical care time did not overlap with that of any other provider or involve time of any procedures.    SWAPNA Reyes [facility]  [facility]

## 2017-03-01 NOTE — PROGRESS NOTES
[facility] Hospital Medicine  Progress Note    Patient Name: Matty Nunn  MRN: [id number]  Patient Class: IP- Inpatient   Admission Date: 2/22/2017  Length of Stay: 6 days  Attending Physician: Victorino Yu MD  Primary Care Provider: Ruddy Noble MD        Subjective:     Principal Problem:Acute respiratory failure with hypoxia    HPI:  Patient is a 35 yo male with a hx of gout presented to [facility] on 2/21/17 with a right axillary abscess which was I&D'd in the ER.  Patient was sent home on Bactrim.  He return to the ER today for increase difficulty breathing.  He was intubated in the ER, but self extubated himself.  He was then re intubated and sent to [facility] for further ICU care.      All history taken from records as pt has been intubated and sedated since prior to arrival.  Case d/w Dr. Mckinney ([facility] ER attending) who states all hx was taken from the pt prior to intubation.  Patient has a notable breath ceci on his left leg.      Hospital Course:       Interval History:  Oxygen and PEEP requirements increased again.  No other significant problems.    Review of Systems   Unable to perform ROS: Intubated     Objective:     Vital Signs (Most Recent):  Temp: 99.2 °F (37.3 °C) (02/28/17 1500)  Pulse: 71 (02/28/17 2023)  Resp: 13 (02/28/17 2023)  BP: (!) 165/92 (02/28/17 2105)  SpO2: 95 % (02/28/17 2023) Vital Signs (24h Range):  Temp:  [98.5 °F (36.9 °C)-99.2 °F (37.3 °C)] 99.2 °F (37.3 °C)  Pulse:  [60-85] 71  Resp:  [10-19] 13  SpO2:  [90 %-95 %] 95 %  BP: (114-165)/(53-92) 165/92     Weight: (!) 167.5 kg (369 lb 4.3 oz)  Body mass index is 48.72 kg/(m^2).    Intake/Output Summary (Last 24 hours) at 02/28/17 2151  Last data filed at 02/28/17 1800   Gross per 24 hour   Intake             3645 ml   Output             3880 ml   Net             -235 ml      Physical Exam   Constitutional: He appears well-developed and well-nourished. No distress.   HENT:   Head:  Normocephalic and atraumatic.   Mouth/Throat: Oropharynx is clear and moist.   Eyes: Conjunctivae and EOM are normal. Pupils are equal, round, and reactive to light.   Neck: No JVD present. No thyromegaly present.   Cardiovascular: Normal rate, regular rhythm and normal heart sounds.  Exam reveals no gallop and no friction rub.    No murmur heard.  Pulmonary/Chest: Effort normal and breath sounds normal. No respiratory distress. He has no wheezes. He has no rales.   Abdominal: Soft. Bowel sounds are normal. He exhibits no distension. There is no tenderness. There is no rebound and no guarding.   Musculoskeletal: Normal range of motion. He exhibits no edema or tenderness.   Lymphadenopathy:     He has no cervical adenopathy.   Neurological: He has normal reflexes. He displays normal reflexes. No cranial nerve deficit.   Intubated and sedated   Skin: Skin is warm and dry. No rash noted. He is not diaphoretic. No erythema.   Small left anterior axillary wound.  Dressed clean and intact.  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       Significant Labs:   CBC:     Recent Labs  Lab 02/27/17  0322 02/28/17  0407   WBC 12.89* 10.39   HGB 12.9* 13.7*   HCT 38.0* 40.1    232     CMP:     Recent Labs  Lab 02/27/17 0322 02/28/17  0407    140   K 4.4 4.4   CL 98 95   CO2 29 34*   * 273*   BUN 31* 39*   CREATININE 1.1 1.1   CALCIUM 8.9 9.0   PROT 6.7 7.1   ALBUMIN 3.2* 3.3*   BILITOT 0.5 0.9   ALKPHOS 41* 41*   AST 44* 70*   * 190*   ANIONGAP 11 11   EGFRNONAA >60 >60       Significant Imaging: I have reviewed all pertinent imaging results/findings within the past 24 hours.    Assessment/Plan:      * Acute respiratory failure with hypoxia  Increasing difficulty with oxygenation requiring 100% FiO2 and increasing PEEP.  CT chest negative for embolism but segmental consolidation bilaterally in the bases worse on the right.  Receiving chest physiotherapy with nebulizer treatments for possible mucus  plugging.  Increased FiO2 and PEEP again - Repeat CT chest.    Allergic reaction  Associated with Bactrim initiation.  He received Steroids and anti-histamines.  No epinephrine.  Intubated for airway protection.  No associated rash but Dr. Mckinney noted denuded mucosal skin in his mouth and throat before he was intubated.    Abscess of axilla, right  Initially prescribed Bactrim then Clindamycin now Zosyn.    Anaphylaxis  Associated with staring Bactrim and presumed allergic response.  Intubated for airway protection.    Pneumonia due to Gram-negative bacteria  CXR and CTA suggest lower lobe consolidation/atelectasis.  Patient started on Zosyn and Clindamycin now just Zosyn.  Clindamycin will cover axillary abscess and possible MRSA.    Hyperglycemia, drug-induced  Accuchecks and SSI, related to steroids.    VTE Risk Mitigation         Ordered     enoxaparin injection 40 mg  Daily     Route:  Subcutaneous        02/22/17 2146     Medium Risk of VTE  Once      02/22/17 1802     Place sequential compression device  Until discontinued      02/22/17 1802          Victorino Yu MD  Department of Hospital Medicine   [facility]    Critical care time:  30 Minutes

## 2017-03-01 NOTE — SUBJECTIVE & OBJECTIVE
Interval History:  Oxygen and PEEP requirements increased again.  No other significant problems.    Review of Systems   Unable to perform ROS: Intubated     Objective:     Vital Signs (Most Recent):  Temp: 99.2 °F (37.3 °C) (02/28/17 1500)  Pulse: 71 (02/28/17 2023)  Resp: 13 (02/28/17 2023)  BP: (!) 165/92 (02/28/17 2105)  SpO2: 95 % (02/28/17 2023) Vital Signs (24h Range):  Temp:  [98.5 °F (36.9 °C)-99.2 °F (37.3 °C)] 99.2 °F (37.3 °C)  Pulse:  [60-85] 71  Resp:  [10-19] 13  SpO2:  [90 %-95 %] 95 %  BP: (114-165)/(53-92) 165/92     Weight: (!) 167.5 kg (369 lb 4.3 oz)  Body mass index is 48.72 kg/(m^2).    Intake/Output Summary (Last 24 hours) at 02/28/17 2151  Last data filed at 02/28/17 1800   Gross per 24 hour   Intake             3645 ml   Output             3880 ml   Net             -235 ml      Physical Exam   Constitutional: He appears well-developed and well-nourished. No distress.   HENT:   Head: Normocephalic and atraumatic.   Mouth/Throat: Oropharynx is clear and moist.   Eyes: Conjunctivae and EOM are normal. Pupils are equal, round, and reactive to light.   Neck: No JVD present. No thyromegaly present.   Cardiovascular: Normal rate, regular rhythm and normal heart sounds.  Exam reveals no gallop and no friction rub.    No murmur heard.  Pulmonary/Chest: Effort normal and breath sounds normal. No respiratory distress. He has no wheezes. He has no rales.   Abdominal: Soft. Bowel sounds are normal. He exhibits no distension. There is no tenderness. There is no rebound and no guarding.   Musculoskeletal: Normal range of motion. He exhibits no edema or tenderness.   Lymphadenopathy:     He has no cervical adenopathy.   Neurological: He has normal reflexes. He displays normal reflexes. No cranial nerve deficit.   Intubated and sedated   Skin: Skin is warm and dry. No rash noted. He is not diaphoretic. No erythema.   Small left anterior axillary wound.  Dressed clean and intact.  Port-wine lesion over left  foot and ankle.       Significant Labs:   CBC:     Recent Labs  Lab 02/27/17  0322 02/28/17  0407   WBC 12.89* 10.39   HGB 12.9* 13.7*   HCT 38.0* 40.1    232     CMP:     Recent Labs  Lab 02/27/17  0322 02/28/17  0407    140   K 4.4 4.4   CL 98 95   CO2 29 34*   * 273*   BUN 31* 39*   CREATININE 1.1 1.1   CALCIUM 8.9 9.0   PROT 6.7 7.1   ALBUMIN 3.2* 3.3*   BILITOT 0.5 0.9   ALKPHOS 41* 41*   AST 44* 70*   * 190*   ANIONGAP 11 11   EGFRNONAA >60 >60       Significant Imaging: I have reviewed all pertinent imaging results/findings within the past 24 hours.

## 2017-03-01 NOTE — PROGRESS NOTES
PROGRESS NOTE  HOSPITAL MEDICINE    Admit Date: 2/22/2017    Follow-up For:  Acute respiratory failure with hypoxia     Subjective:     Sedated on Vent     Interval History:    Patient is a 35 yo male with a hx of gout presented to [facility] ER on 2/21/17 with a right axillary abscess which was I&D'd in the ER. Patient was sent home on Bactrim. He is presently intubated and being treated for Pneumonia      Review of Systems unable to obtain      Objective:      Vital Signs Range (Last 24H):  Temp:  [98.7 °F (37.1 °C)-99.4 °F (37.4 °C)]   Pulse:  []   Resp:  [12-20]   BP: (129-173)/(59-92)   SpO2:  [89 %-100 %]     I & O (Last 24H):    Intake/Output Summary (Last 24 hours) at 03/01/17 1627  Last data filed at 03/01/17 1300   Gross per 24 hour   Intake           1895.6 ml   Output             4690 ml   Net          -2794.4 ml       Physical Exam   Constitutional: appears well nourished  Head: Normocephalic and atraumatic. ETT in place  Neck: Neck supple. No mass  Cardiovascular: Normal rate and regular rhythm.    Pulmonary/Chest: Good air entry bilaterally, decrease at the bases  Abdomen: Soft. Non-tender and non-distended. Bowel sounds are normal.   Neurological: Moves all extremities.  Skin: Warm and dry.  diffuse rash     Extremities: No clubbing cyanosis or edema.    Medications:  Current Facility-Administered Medications   Medication    acetaminophen oral solution 650 mg    albuterol-ipratropium 2.5mg-0.5mg/3mL nebulizer solution 3 mL    bisacodyl suppository 10 mg    dextrose 50% injection 12.5 g    enoxaparin injection 40 mg    famotidine IVPB 20 mg    [START ON 3/2/2017] furosemide injection 40 mg    glucagon (human recombinant) injection 1 mg    hydrALAZINE injection 10 mg    insulin aspart pen 1-10 Units    insulin detemir pen 10 Units    lorazepam injection 1 mg    metoclopramide HCl injection 10 mg    ondansetron injection 4 mg    piperacillin-tazobactam 4.5 g in dextrose 5 % 100 mL  IVPB (ready to mix system)    polyethylene glycol packet 17 g       Laboratory Data:  Reviewed and noted in plan where applicable. Please see chart for full laboratory data.    Lab Results   Component Value Date    WBC 12.20 03/01/2017    HGB 14.7 03/01/2017    HCT 44.0 03/01/2017    MCV 94 03/01/2017     03/01/2017         Recent Labs  Lab 03/01/17  0436   *      K 3.5   CL 94*   CO2 36*   BUN 43*   CREATININE 1.2   CALCIUM 9.6       Microbiology Results (last 7 days)     Procedure Component Value Units Date/Time    Blood culture [[id number]] Collected:  03/01/17 0923    Order Status:  Sent Specimen:  Blood Updated:  03/01/17 1408    Blood Culture #1 **CANNOT BE ORDERED STAT** [[id number]] Collected:  02/22/17 1030    Order Status:  Completed Specimen:  Blood from Peripheral, Hand, Right Updated:  02/27/17 2212     Blood Culture, Routine No growth after 5 days.    Blood Culture #2 **CANNOT BE ORDERED STAT** [[id number]] Collected:  02/22/17 1045    Order Status:  Completed Specimen:  Blood from Peripheral, Hand, Right Updated:  02/27/17 2212     Blood Culture, Routine No growth after 5 days.    Culture, Respiratory with Gram Stain [[id number]] Collected:  02/23/17 1647    Order Status:  Completed Specimen:  Respiratory from Sputum, Induced Updated:  02/27/17 1140     Respiratory Culture Normal respiratory roxana     Gram Stain (Respiratory) >10 epithelial cells per low power field     Gram Stain (Respiratory) Many WBC's     Gram Stain (Respiratory) Many Gram negative rods     Gram Stain (Respiratory) Few Gram positive cocci     Gram Stain (Respiratory) Rare Gram negative diplococci    Narrative:       Endotracheal tube suction    Aerobic culture [[id number]] Collected:  02/24/17 0933    Order Status:  Sent Specimen:  Wound from Arm, Right Updated:  02/24/17 0933    Narrative:       Right axilla          Radiology:  Reviewed and noted in plan where applicable.  Please see chart for full radiology  data.    ASSESSMENT/PLAN:     Active Hospital Problems    Diagnosis  POA    *Acute respiratory failure with hypoxia [J96.01]  Yes    Pneumonia due to Gram-negative bacteria [J15.6]  Yes    Hyperglycemia, drug-induced [R73.9]  No    Anaphylaxis [T78.2XXA]  Yes      Resolved Hospital Problems    Diagnosis Date Resolved POA    Allergic reaction [T78.40XA] 03/01/2017 Yes    Abscess of axilla, right [L02.411] 03/01/2017 Yes     VDRF due to Pneumonia     CT chest negative for embolism but segmental consolidation bilaterally in the bases worse on the right.    follow up CXR today shows improvement       Allergic reaction  Associated with Bactrim initiation     Abscess of axilla, right  Initially prescribed Bactrim   Healing well     Diffuse skin rash    Possibly due to drug reaction        Pneumonia due to Gram-negative bacteremia    CTA suggest lower lobe consolidation/atelectasis   continue antibiotic coverage       Hyperglycemia, drug-induced  Accuchecks and SSI, related to steroids       Disposition -continued critical care management of  VDRF                        Case discussed with critical care team  Critical care   Time spent - >45mins

## 2017-03-01 NOTE — PLAN OF CARE
Problem: Patient Care Overview  Goal: Plan of Care Review  Outcome: Ongoing (interventions implemented as appropriate)  Plan of care review with patient and family, family verbalized complete understanding ( wife and mother).Patient remain on ventilator, on propofol drip,fentannyl drip, no bowel movement this shift.safety measures implemented.

## 2017-03-01 NOTE — ASSESSMENT & PLAN NOTE
Associated with Bactrim initiation.  He received Steroids and anti-histamines.  No epinephrine.  Intubated for airway protection.  No associated rash but Dr. Mckinney noted denuded mucosal skin in his mouth and throat before he was intubated.

## 2017-03-01 NOTE — ASSESSMENT & PLAN NOTE
Increasing difficulty with oxygenation requiring 100% FiO2 and increasing PEEP.  CT chest negative for embolism but segmental consolidation bilaterally in the bases worse on the right.  Receiving chest physiotherapy with nebulizer treatments for possible mucus plugging.  Increased FiO2 and PEEP again - Repeat CT chest.

## 2017-03-01 NOTE — NURSING
Called dietary for peptamen  Feeding formula , spoke to gena, she states will bring it up for you '', will follow up

## 2017-03-02 PROBLEM — T50.905A HYPERGLYCEMIA, DRUG-INDUCED: Status: RESOLVED | Noted: 2017-02-24 | Resolved: 2017-03-02

## 2017-03-02 PROBLEM — R73.9 HYPERGLYCEMIA, DRUG-INDUCED: Status: RESOLVED | Noted: 2017-02-24 | Resolved: 2017-03-02

## 2017-03-02 PROBLEM — J96.01 ACUTE RESPIRATORY FAILURE WITH HYPOXIA: Status: RESOLVED | Noted: 2017-02-24 | Resolved: 2017-03-02

## 2017-03-02 LAB
ALBUMIN SERPL BCP-MCNC: 3.7 G/DL
ALP SERPL-CCNC: 51 U/L
ALT SERPL W/O P-5'-P-CCNC: 224 U/L
ANION GAP SERPL CALC-SCNC: 16 MMOL/L
ANISOCYTOSIS BLD QL SMEAR: SLIGHT
AST SERPL-CCNC: 84 U/L
BASOPHILS # BLD AUTO: ABNORMAL K/UL
BASOPHILS NFR BLD: 0 %
BILIRUB SERPL-MCNC: 1.6 MG/DL
BUN SERPL-MCNC: 38 MG/DL
CALCIUM SERPL-MCNC: 9.8 MG/DL
CHLORIDE SERPL-SCNC: 99 MMOL/L
CO2 SERPL-SCNC: 31 MMOL/L
CREAT SERPL-MCNC: 1 MG/DL
DIFFERENTIAL METHOD: ABNORMAL
EOSINOPHIL # BLD AUTO: ABNORMAL K/UL
EOSINOPHIL NFR BLD: 0 %
ERYTHROCYTE [DISTWIDTH] IN BLOOD BY AUTOMATED COUNT: 13.8 %
EST. GFR  (AFRICAN AMERICAN): >60 ML/MIN/1.73 M^2
EST. GFR  (NON AFRICAN AMERICAN): >60 ML/MIN/1.73 M^2
GLUCOSE SERPL-MCNC: 181 MG/DL
HCT VFR BLD AUTO: 48.2 %
HGB BLD-MCNC: 16.3 G/DL
LYMPHOCYTES # BLD AUTO: ABNORMAL K/UL
LYMPHOCYTES NFR BLD: 11 %
MCH RBC QN AUTO: 31.5 PG
MCHC RBC AUTO-ENTMCNC: 33.8 %
MCV RBC AUTO: 93 FL
MONOCYTES # BLD AUTO: ABNORMAL K/UL
MONOCYTES NFR BLD: 7 %
MYELOCYTES NFR BLD MANUAL: 1 %
NEUTROPHILS NFR BLD: 73 %
NEUTS BAND NFR BLD MANUAL: 8 %
PLATELET # BLD AUTO: 224 K/UL
PLATELET BLD QL SMEAR: ABNORMAL
PMV BLD AUTO: 9.2 FL
POCT GLUCOSE: 135 MG/DL (ref 70–110)
POCT GLUCOSE: 152 MG/DL (ref 70–110)
POCT GLUCOSE: 156 MG/DL (ref 70–110)
POCT GLUCOSE: 159 MG/DL (ref 70–110)
POLYCHROMASIA BLD QL SMEAR: ABNORMAL
POTASSIUM SERPL-SCNC: 2.8 MMOL/L
PROT SERPL-MCNC: 8.1 G/DL
RBC # BLD AUTO: 5.17 M/UL
RPR SER QL: NORMAL
SODIUM SERPL-SCNC: 146 MMOL/L
WBC # BLD AUTO: 13.75 K/UL

## 2017-03-02 PROCEDURE — 87040 BLOOD CULTURE FOR BACTERIA: CPT

## 2017-03-02 PROCEDURE — 97535 SELF CARE MNGMENT TRAINING: CPT

## 2017-03-02 PROCEDURE — 25000003 PHARM REV CODE 250: Performed by: INTERNAL MEDICINE

## 2017-03-02 PROCEDURE — G8978 MOBILITY CURRENT STATUS: HCPCS | Mod: CN

## 2017-03-02 PROCEDURE — 63600175 PHARM REV CODE 636 W HCPCS: Performed by: INTERNAL MEDICINE

## 2017-03-02 PROCEDURE — 36415 COLL VENOUS BLD VENIPUNCTURE: CPT

## 2017-03-02 PROCEDURE — 27000221 HC OXYGEN, UP TO 24 HOURS

## 2017-03-02 PROCEDURE — G8987 SELF CARE CURRENT STATUS: HCPCS | Mod: CN

## 2017-03-02 PROCEDURE — G8988 SELF CARE GOAL STATUS: HCPCS | Mod: CK

## 2017-03-02 PROCEDURE — 25000003 PHARM REV CODE 250: Performed by: NURSE PRACTITIONER

## 2017-03-02 PROCEDURE — 97530 THERAPEUTIC ACTIVITIES: CPT

## 2017-03-02 PROCEDURE — 99291 CRITICAL CARE FIRST HOUR: CPT | Mod: ,,, | Performed by: INTERNAL MEDICINE

## 2017-03-02 PROCEDURE — 63600175 PHARM REV CODE 636 W HCPCS: Performed by: NURSE PRACTITIONER

## 2017-03-02 PROCEDURE — 80053 COMPREHEN METABOLIC PANEL: CPT

## 2017-03-02 PROCEDURE — 97162 PT EVAL MOD COMPLEX 30 MIN: CPT

## 2017-03-02 PROCEDURE — 94640 AIRWAY INHALATION TREATMENT: CPT

## 2017-03-02 PROCEDURE — 85027 COMPLETE CBC AUTOMATED: CPT

## 2017-03-02 PROCEDURE — 97803 MED NUTRITION INDIV SUBSEQ: CPT

## 2017-03-02 PROCEDURE — 25000242 PHARM REV CODE 250 ALT 637 W/ HCPCS: Performed by: INTERNAL MEDICINE

## 2017-03-02 PROCEDURE — 85007 BL SMEAR W/DIFF WBC COUNT: CPT

## 2017-03-02 PROCEDURE — G8979 MOBILITY GOAL STATUS: HCPCS | Mod: CK

## 2017-03-02 PROCEDURE — 97165 OT EVAL LOW COMPLEX 30 MIN: CPT

## 2017-03-02 PROCEDURE — 20000000 HC ICU ROOM

## 2017-03-02 RX ORDER — METOPROLOL TARTRATE 25 MG/1
25 TABLET, FILM COATED ORAL 2 TIMES DAILY
Status: DISCONTINUED | OUTPATIENT
Start: 2017-03-02 | End: 2017-03-08 | Stop reason: HOSPADM

## 2017-03-02 RX ORDER — ENALAPRILAT 1.25 MG/ML
1.25 INJECTION INTRAVENOUS ONCE
Status: COMPLETED | OUTPATIENT
Start: 2017-03-02 | End: 2017-03-02

## 2017-03-02 RX ORDER — POTASSIUM CHLORIDE 20 MEQ/15ML
40 SOLUTION ORAL DAILY
Status: DISCONTINUED | OUTPATIENT
Start: 2017-03-02 | End: 2017-03-02

## 2017-03-02 RX ORDER — POTASSIUM CHLORIDE 20 MEQ/15ML
60 SOLUTION ORAL ONCE
Status: COMPLETED | OUTPATIENT
Start: 2017-03-02 | End: 2017-03-02

## 2017-03-02 RX ORDER — POTASSIUM CHLORIDE 7.45 MG/ML
10 INJECTION INTRAVENOUS ONCE
Status: COMPLETED | OUTPATIENT
Start: 2017-03-02 | End: 2017-03-02

## 2017-03-02 RX ORDER — AMLODIPINE BESYLATE 5 MG/1
5 TABLET ORAL DAILY
Status: DISCONTINUED | OUTPATIENT
Start: 2017-03-02 | End: 2017-03-08 | Stop reason: HOSPADM

## 2017-03-02 RX ORDER — FAMOTIDINE 20 MG/1
20 TABLET, FILM COATED ORAL 2 TIMES DAILY
Status: DISCONTINUED | OUTPATIENT
Start: 2017-03-02 | End: 2017-03-08 | Stop reason: HOSPADM

## 2017-03-02 RX ADMIN — FAMOTIDINE 20 MG: 20 INJECTION, SOLUTION INTRAVENOUS at 09:03

## 2017-03-02 RX ADMIN — POTASSIUM CHLORIDE 10 MEQ: 10 INJECTION, SOLUTION INTRAVENOUS at 07:03

## 2017-03-02 RX ADMIN — METOCLOPRAMIDE 10 MG: 5 INJECTION, SOLUTION INTRAMUSCULAR; INTRAVENOUS at 02:03

## 2017-03-02 RX ADMIN — IPRATROPIUM BROMIDE AND ALBUTEROL SULFATE 3 ML: .5; 3 SOLUTION RESPIRATORY (INHALATION) at 12:03

## 2017-03-02 RX ADMIN — ENALAPRILAT 1.25 MG: 1.25 INJECTION, SOLUTION INTRAVENOUS at 09:03

## 2017-03-02 RX ADMIN — ENOXAPARIN SODIUM 40 MG: 100 INJECTION SUBCUTANEOUS at 12:03

## 2017-03-02 RX ADMIN — AMLODIPINE BESYLATE 5 MG: 5 TABLET ORAL at 09:03

## 2017-03-02 RX ADMIN — NICARDIPINE HYDROCHLORIDE 15 MG/HR: 0.2 INJECTION, SOLUTION INTRAVENOUS at 07:03

## 2017-03-02 RX ADMIN — FUROSEMIDE 40 MG: 10 INJECTION, SOLUTION INTRAMUSCULAR; INTRAVENOUS at 09:03

## 2017-03-02 RX ADMIN — METOCLOPRAMIDE 10 MG: 5 INJECTION, SOLUTION INTRAMUSCULAR; INTRAVENOUS at 09:03

## 2017-03-02 RX ADMIN — POTASSIUM CHLORIDE 60 MEQ: 20 SOLUTION ORAL at 12:03

## 2017-03-02 RX ADMIN — IPRATROPIUM BROMIDE AND ALBUTEROL SULFATE 3 ML: .5; 3 SOLUTION RESPIRATORY (INHALATION) at 08:03

## 2017-03-02 RX ADMIN — INSULIN ASPART 2 UNITS: 100 INJECTION, SOLUTION INTRAVENOUS; SUBCUTANEOUS at 06:03

## 2017-03-02 RX ADMIN — METOCLOPRAMIDE 10 MG: 5 INJECTION, SOLUTION INTRAMUSCULAR; INTRAVENOUS at 05:03

## 2017-03-02 RX ADMIN — NICARDIPINE HYDROCHLORIDE 7.5 MG/HR: 0.2 INJECTION, SOLUTION INTRAVENOUS at 10:03

## 2017-03-02 RX ADMIN — ENALAPRILAT 1.25 MG: 1.25 INJECTION INTRAVENOUS at 12:03

## 2017-03-02 RX ADMIN — VANCOMYCIN HYDROCHLORIDE 2000 MG: 1 INJECTION, POWDER, LYOPHILIZED, FOR SOLUTION INTRAVENOUS at 07:03

## 2017-03-02 RX ADMIN — PIPERACILLIN SODIUM AND TAZOBACTAM SODIUM 4.5 G: 4; .5 INJECTION, POWDER, FOR SOLUTION INTRAVENOUS at 07:03

## 2017-03-02 RX ADMIN — PIPERACILLIN SODIUM AND TAZOBACTAM SODIUM 4.5 G: 4; .5 INJECTION, POWDER, FOR SOLUTION INTRAVENOUS at 12:03

## 2017-03-02 RX ADMIN — NICARDIPINE HYDROCHLORIDE 15 MG/HR: 0.2 INJECTION, SOLUTION INTRAVENOUS at 04:03

## 2017-03-02 RX ADMIN — METOPROLOL TARTRATE 25 MG: 25 TABLET ORAL at 11:03

## 2017-03-02 RX ADMIN — FAMOTIDINE 20 MG: 20 TABLET ORAL at 09:03

## 2017-03-02 RX ADMIN — METOPROLOL TARTRATE 5 MG: 5 INJECTION INTRAVENOUS at 05:03

## 2017-03-02 RX ADMIN — POLYETHYLENE GLYCOL 3350 17 G: 17 POWDER, FOR SOLUTION ORAL at 09:03

## 2017-03-02 RX ADMIN — METOPROLOL TARTRATE 25 MG: 25 TABLET ORAL at 09:03

## 2017-03-02 RX ADMIN — POTASSIUM CHLORIDE 40 MEQ: 20 SOLUTION ORAL at 07:03

## 2017-03-02 RX ADMIN — NICARDIPINE HYDROCHLORIDE 15 MG/HR: 0.2 INJECTION, SOLUTION INTRAVENOUS at 02:03

## 2017-03-02 NOTE — PLAN OF CARE
Problem: Patient Care Overview  Goal: Plan of Care Review  Outcome: Ongoing (interventions implemented as appropriate)  Plan of care review with patient, Cardene drip infusing, no nausea no vomiting , NGT put out 300 cc of green liquid. Patient alert oriented to self, time, place, and situation.

## 2017-03-02 NOTE — EICU
eICU Note :    Called by the [facility]:    Problem: High BP ,not responding to Lopressor and Hydralazine, pt extubated recently     Pertinent History and labs reviewed :  Acute Respiratory Failure With Hypoxia   Pneumonia of Both Lower Lobes Due to Infectious Organism   Hyperglycemia, Drug-Induced   Allergic Reaction   Anaphylaxis   Abscess of Axilla, Right        Treatment /Intervention given:start Cardene drip ,titrate to SBP <150            Alessandra Benavides M.D  eICU Physician

## 2017-03-02 NOTE — PROGRESS NOTES
pts blood pressures in the 200s/100s.  Hydralazine given as prn orders.  pts sbp remains in the 190s.  SHERLYN Wang notified.  Awaiting orders.    Metoprolol given IVP as ordered.  Report given to RAISA Eisenberg.

## 2017-03-02 NOTE — PLAN OF CARE
Problem: Patient Care Overview  Goal: Plan of Care Review  Outcome: Ongoing (interventions implemented as appropriate)  Patient remains on 2lnc with no complaints. Tolerated neb tx well.

## 2017-03-02 NOTE — PT/OT/SLP EVAL
Physical Therapy  Evaluation    Matty Nunn   MRN: [id number]   Admitting Diagnosis: Acute respiratory failure with hypoxia    PT Received On: 17  PT Start Time: 0800     PT Stop Time: 0830    PT Total Time (min): 30 min       Billable Minutes:  Evaluation 15 and Therapeutic Activity 15    Diagnosis: Acute respiratory failure with hypoxia  P.T. TX DX: IMPAIRED FUNCTIONAL MOBILITY    Past Medical History:   Diagnosis Date    Gout       History reviewed. No pertinent surgical history.    Referring physician: DR. SERRA  Date referred to PT: 3/2/2017    General Precautions: Standard, FALL, RESPIRATORY  Orthopedic Precautions: N/A   Braces: N/A       Patient History:  Lives With: spouse  Living Arrangements: mobile home  Home Accessibility: stairs to enter home  Home Layout: Able to live on 1st floor  Number of Stairs to Enter Home: 4  Stair Railings at Home: outside, present at both sides  Transportation Available: car, family or friend will provide  Living Environment Comment: PT LIVES WITH WIFE WHO IS ABLE TO ASSIST PT BUT WORKS DURING THE DAY, PT REPORTS HIS MOM CAN COME OVER TO HELP AS NEEDED.  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  NURSE SAN PRESENT TO ASSIST WITH FUNCTIONAL MOBILITY DURING EVAL  Pain Ratin/10     Objective:   Patient found with: telemetry, blood pressure cuff, oxygen, NG tube, crisostomo catheter, pulse ox (continuous)     Cognitive Exam:  Oriented to: Person and Place    Follows Commands/attention: Follows one-step commands, MINIMAL COMMANDS, LIMITED BY LETHARGY  Communication: SOFT SPOKEN AND SLURRED, PT C/O DRY MOUTH, DIFFICULT TO UNDERSTAND AT TIMES  Safety  awareness/insight to disability: impaired    Physical Exam:  Postural examination/scapula alignment: Rounded shoulder and Head forward    Skin integrity: Visible skin intact  Edema: Moderate BUE, BLE    Sensation:   Intact BLE    Upper Extremity Range of Motion:  REFER TO O.T. EVAL    Lower Extremity Range of Motion:  Right Lower Extremity: WFL, AAROM/AROM  Left Lower Extremity: WFL, AAROM/AROM    Lower Extremity Strength:  Right Lower Extremity: GROSSLY 3/5  Left Lower Extremity: GROSSLY 3/5     Functional Mobility:  Bed Mobility:  Rolling/Turning to Left: Total assistance  Rolling/Turning Right: Total assistance  Scooting/Bridging: Total Assistance (TOTAL X 3)  Supine to Sit: Total Assistance (TOTAL X 3)  Sit to Supine: Total Assistance (BED IN TRENDELENBERG POSITION FOR SUPINE SCOOT TOWARD HEAD OF BED, TOTAL X 3)    Transfers:  Sit <> Stand Assistance: Activity did not occur    Gait:     Balance:   Static Sit: POOR  Dynamic Sit:   Static Stand:   Dynamic stand:     Therapeutic Activities and Exercises:  PT REQUIRED INCREASED TIME FOR ALL FUNCTIONAL MOBILITY, PT ASSISTED TO EOB WITH TOTAL ASSIST X 3 STAFF, PT TOLERATED SITTING EOB FOR APPROX. 5 SECONDS BEFORE C/O SEVERE DIZZINESS, VERTICAL NYSTAGMUS PRESENT IN SITTING, NOTIFIED NURSE JASWINDER, PT APPEARED TO BE PASSING OUT SO IMMEDIATELY ASSISTED BACK TO SUPINE IN WHICH PT AWAKE/ALERT.  PT LEFT SUPINE IN BED, EDUCATED IN BLE THEREX/ROM TO PERFORM THROUGHOUT THE DAY    Patient left supine with all lines intact, call button in reach, NURSE notified and NURSE present.    Assessment:   Matty Nunn is a 36 y.o. male with a medical diagnosis of Acute respiratory failure with hypoxia and presents with IMPAIRED FUNCTIONAL MOBILITY. PT WILL BENEFIT FROM CONT. SKILLED P.T. TO ADDRESS IMPAIRMENTS    Rehab identified problem list/impairments: Rehab identified problem list/impairments: weakness, impaired endurance, impaired self care skills, impaired functional  mobilty, gait instability, impaired balance, decreased lower extremity function, decreased upper extremity function, edema, decreased safety awareness    Rehab potential is good.    Activity tolerance: Fair    Discharge recommendations: Discharge Facility/Level Of Care Needs: rehabilitation facility (TO BE ASSESSED WITH PROGRESS)     Barriers to discharge: Barriers to Discharge: Decreased caregiver support    Equipment recommendations: Equipment Needed After Discharge:  (TO BE ASSESSED WITH PROGRESS)     GOALS:   Physical Therapy Goals        Problem: Physical Therapy Goal    Goal Priority Disciplines Outcome Goal Variances Interventions   Physical Therapy Goal     PT/OT, PT      Description:  LTG'S TO BE MET IN 7 DAYS (3-9-17)  1. PT WILL REQUIRE MODA FOR BED MOBILITY  2. PT WILL REQUIRE MAXA FOR TF'S  3. PT WILL TOLERATE BLE THEREX X 20 REPS AAROM/AROM  4. PT WILL TOLERATE P+ DYNAMIC SITTING BALANCE            PLAN:    Patient to be seen  (A MINIMUM OF 5 OF 7 DAYS A WEEK AS TOLERATED) to address the above listed problems via gait training, therapeutic activities, therapeutic exercises  Plan of Care expires: 03/09/17  Plan of Care reviewed with: patient     PT ENCOURAGED TO CALL FOR ASSISTANCE WITH ALL NEEDS DUE TO FALL RISK STATUS, PT AGREEABLE    Functional Assessment Tool Used: FIM-BED MOBILITY  Score: 1  Functional Limitation: Mobility: Walking and moving around  Mobility: Walking and Moving Around Current Status (): CN  Mobility: Walking and Moving Around Goal Status (): DULCE Noriega, PT  03/02/2017

## 2017-03-02 NOTE — PROGRESS NOTES
Pt extubated to 5 LNC per Felipe, NP orders.  Pt confused, but is alert and follows simple commands.  Pt denies pain.  Pt very weak.  Will continue to monitor.  Call light within reach.

## 2017-03-02 NOTE — PROGRESS NOTES
patient6 blood pressure very high, not responding to lopressor, hydralazine, eICU notify, Dr. Aguila, Alessandra, camera in , orser Stat Cardene , to maintain SBP < 150, order implemented. Will continue to monitor.

## 2017-03-02 NOTE — PROGRESS NOTES
[facility] -   Critical Care Medicine  Progress Note    Patient Name: Matty Nunn  MRN: [id number]  Admission Date: 2/22/2017  Hospital Length of Stay: 8 days  Code Status: Full Code  Attending Provider: Macey Hargrove MD  Primary Care Provider: Ruddy Noble MD   Principal Problem: Acute respiratory failure with hypoxia    Subjective:     HPI:  Intubated for acute respiratory failure    Hospital/ICU Course:   SP extubation yesterday  Episode of nasuea    Interval History/Significant Events:   Elevated BP: cardene gtt added      Review of Systems   Constitutional: Positive for fatigue. Negative for fever.   HENT: Negative.    Eyes: Negative.    Respiratory: Negative for cough, shortness of breath and wheezing.    Cardiovascular: Negative.    Gastrointestinal: Positive for abdominal pain and nausea.        Nausea and x 1 vomit yesterday   Endocrine: Negative.    Genitourinary: Negative.    Musculoskeletal: Negative.    Skin: Negative.    Allergic/Immunologic: Negative.    Neurological: Negative.    Hematological: Negative.    Psychiatric/Behavioral: Positive for dysphoric mood.     Objective:     Vital Signs (Most Recent):  Temp: 98.9 °F (37.2 °C) (03/02/17 0715)  Pulse: 101 (03/02/17 1000)  Resp: 20 (03/02/17 1000)  BP: (!) 168/72 (03/02/17 1000)  SpO2: 96 % (03/02/17 1000) Vital Signs (24h Range):  Temp:  [98.3 °F (36.8 °C)-99.7 °F (37.6 °C)] 98.9 °F (37.2 °C)  Pulse:  [] 101  Resp:  [12-31] 20  SpO2:  [90 %-100 %] 96 %  BP: (131-204)/() 168/72     Weight: (!) 155.9 kg (343 lb 11.2 oz)  Body mass index is 45.35 kg/(m^2).      Intake/Output Summary (Last 24 hours) at 03/02/17 1141  Last data filed at 03/02/17 1000   Gross per 24 hour   Intake          1037.33 ml   Output             2870 ml   Net         -1832.67 ml       Physical Exam   Constitutional: He is oriented to person, place, and time. He appears well-developed and well-nourished. No distress.   HENT:   Head:  Normocephalic and atraumatic.   Nose: Nose normal.   Mouth/Throat: Oropharynx is clear and moist. No oropharyngeal exudate.   Eyes: Conjunctivae and EOM are normal. Pupils are equal, round, and reactive to light. Left eye exhibits no discharge.   Neck: Normal range of motion. Neck supple. No JVD present. No tracheal deviation present. No thyromegaly present.   Cardiovascular: Normal rate, regular rhythm and normal heart sounds.    No murmur heard.  Pulmonary/Chest: Effort normal and breath sounds normal. No respiratory distress. He has no wheezes. He has no rales. He exhibits no tenderness.   Abdominal: Soft. Bowel sounds are normal. He exhibits no distension. There is no guarding.   Genitourinary:   Genitourinary Comments: Has crisostomo   Musculoskeletal: Normal range of motion. He exhibits edema. He exhibits no deformity.   Neurological: He is alert and oriented to person, place, and time. He has normal reflexes. No cranial nerve deficit.   Skin: Skin is warm and dry. No erythema.   Nursing note and vitals reviewed.      Vents:  Vent Mode: Spont (03/01/17 1441)  Ventilator Initiated: Yes (02/22/17 0845)  Set Rate: 0 bmp (03/01/17 1441)  Vt Set: 750 mL (03/01/17 1441)  Pressure Support: 12 cmH20 (03/01/17 1441)  PEEP/CPAP: 5 cmH20 (03/01/17 1441)  Oxygen Concentration (%): 28 (03/02/17 0026)  Peak Airway Pressure: 18 cmH2O (03/01/17 1441)  Total Ve: 9.79 mL (03/01/17 1441)  F/VT Ratio<105 (RSBI): (!) 29.85 (03/01/17 1441)    Lines/Drains/Airways     Drain                 Urethral Catheter 02/22/17 1014 Latex 16 Fr. 8 days         NG/OG Tube 02/23/17 0320 [place] sump 16 Fr. Left nostril 7 days          Peripheral Intravenous Line                 Peripheral IV - Single Lumen 02/24/17 1539 Left Antecubital 5 days         Peripheral IV - Single Lumen 02/24/17 1539 Right Hand 5 days                Significant Labs:    CBC/Anemia Profile:    Recent Labs  Lab 03/01/17  0436 03/02/17  0539   WBC 12.20 13.75*   HGB 14.7 16.3    HCT 44.0 48.2    224   MCV 94 93   RDW 13.5 13.8        Chemistries:    Recent Labs  Lab 03/01/17  0436 03/02/17  0539    146*   K 3.5 2.8*   CL 94* 99   CO2 36* 31*   BUN 43* 38*   CREATININE 1.2 1.0   CALCIUM 9.6 9.8   ALBUMIN 3.6 3.7   PROT 7.7 8.1   BILITOT 0.8 1.6*   ALKPHOS 41* 51*   * 224*   AST 72* 84*       ABGs:   Recent Labs  Lab 03/01/17  0428   PH 7.487*   PCO2 58.8*   HCO3 44.5*   POCSATURATED 99   BE 21     POCT Glucose:   Recent Labs  Lab 03/02/17  0057 03/02/17  0541 03/02/17  0913   POCTGLUCOSE 135* 156* 159*     Respiratory Culture: No results for input(s): GSRESP, RESPIRATORYC in the last 48 hours.  All pertinent labs within the past 24 hours have been reviewed.    Significant Imaging:  I have reviewed and interpreted all pertinent imaging results/findings within the past 24 hours.    Assessment/Plan:     Active Diagnoses:    Diagnosis Date Noted POA    Pneumonia due to Gram-negative bacteria [J15.6] 02/24/2017 Yes    Anaphylaxis [T78.2XXA] 02/22/2017 Yes      Problems Resolved During this Admission:    Diagnosis Date Noted Date Resolved POA    PRINCIPAL PROBLEM:  Acute respiratory failure with hypoxia [J96.01] 02/24/2017 03/02/2017 Yes    Hyperglycemia, drug-induced [R73.9] 02/24/2017 03/02/2017 No    Allergic reaction [T78.40XA] 02/22/2017 03/01/2017 Yes    Abscess of axilla, right [L02.411] 02/22/2017 03/01/2017 Yes     Replace K  Wean off Cardene  PO diet  OOB  PT and OT  DC Abx    PLAN:     1. Neuro:   Alert . RASS -0 awakes: alert and calm  ICU neuro monitoring.     2. Pulmonary:   Stable. Keep sats  > 90 - 92 %  Incentive spirometry  Deep breathing   Bronchodilators as needed.      3. Cardiac:   Stable.  Normotensive regular rate and rhythm   Monitor hemodynamics and monitor for dysrhythmias MAP goal of 65 mmHg.   IV lasix  Vasotec x 2, Added Norvasc. Metoprolol 25 mg BID     4. Renal:   Stable. Daily weights and strict I/O's I   Mcgill in place,   monitor I/Os    Recheck ignacio in PM     5. Infectious Disease:   Improving. Monitor fever curve and sepsis surveillance .      6. Hematology/Oncology:   Stable.    Monitor for bleeding  Platelets and h&H stable     7. Endocrine:  INSULIN sliding scale.   Blood glucose target 100 - 180 mg/dl     8. Fluids/Electrolytes/Nutrition/GI:   Monitor and replete electrolytes ( K given PO)  Maintain even fluid balance.  Dulcolax     9. Musculoskeletal:   Stable. Physical Therapy and Occupational Therapy     10. Pain Management:   Pain control adequate.   Analgesia per ICU protocol.  Off drips     11. Discharge and Palliative Care: Home self care.          Critical Care Medicine Daily Checklist:    A: Awake: RASS Goal/Actual Goal: RASS Goal: 0-->alert and calm  Actual: [place]on Agitation Sedation Scale (RASS): Alert and calm   B: Spontaneous Breathing Trial Performed?     C: SAT & SBT Coordinated?  N/A                      D: Delirium: CAM-ICU     E: Early Mobility Performed? Yes   F: Feeding Goal: Goals: Meet needs from nutrition support until extubated  Status: Nutrition Goal Status: new   Current Diet Order   No orders of the defined types were placed in this encounter.      AS: Analgesia/Sedation ICU protocol   T: Thromboembolic Prophylaxis YES   H: HOB > 300 Yes   U: Stress Ulcer Prophylaxis (if needed) YES   G: Glucose Control 156-159   B: Bowel Function Stool Occurrence: 0   I: Indwelling Catheter (Lines & Mcgill) Necessity Mcgill and x 2 peripheral IV   D: De-escalation of Antimicrobials/Pharmacotherapies YES    Plan for the day/ETD Ambulate    Code Status:  Family/Goals of Care: Full Code  HOME     Critical Care Time: 34min  Critical secondary to respiratory failure    DW Dr Hargrove, ICU staff on Multidiscplinary rounds     Critical care was time spent personally by me on the following activities: development of treatment plan with patient or surrogate and bedside caregivers, discussions with consultants, evaluation of patient's  response to treatment, examination of patient, ordering and performing treatments and interventions, ordering and review of laboratory studies, ordering and review of radiographic studies, pulse oximetry, re-evaluation of patient's condition.  This critical care time did not overlap with that of any other provider or involve time for any procedures.     Eduardo Ortega MD  Critical Care Medicine  [facility] -

## 2017-03-02 NOTE — PT/OT/SLP EVAL
Occupational Therapy  Evaluation    Matty Nunn   MRN: [id number]   Admitting Diagnosis: Acute respiratory failure with hypoxia    OT Date of Treatment: 17   OT Start Time: 1038  OT Stop Time: 1101  OT Total Time (min): 23 min    Billable Minutes:  Evaluation 13 MINUTES  Self Care/Home Management 10 MINUTES    Diagnosis: Acute respiratory failure with hypoxia   DEBILITY AND GENERALIZED WEAKNESS    Past Medical History:   Diagnosis Date    Gout       History reviewed. No pertinent surgical history.    Referring physician: DR. SERRA    Date referred to OT: 3-2-17    General Precautions: Standard, contact  Orthopedic Precautions: N/A  Braces:            Patient History:  Living Environment  Lives With: spouse, child(ike), dependent  Equipment Currently Used at Home: none    Prior level of function:   Bed Mobility/Transfers: independent  Grooming: independent  Bathing: independent  Upper Body Dressing: independent  Lower Body Dressing: independent  Toileting: independent  Driving License: Yes  Occupation: Full time employment     Dominant hand: right    Subjective:  Communicated with NURSE SAN AND [facility] CHART REVIEW prior to session.    Chief Complaint: debility and generalized weakness  Patient/Family stated goals:     Pain Ratin/10                   Objective:  Patient found with: telemetry    Cognitive Exam:  Oriented to: Person, Place, Time and Situation  Follows Commands/attention: Follows two-step commands  Communication: dysarthria  Memory:  No Deficits noted  Safety awareness/insight to disability: intact  Coping skills/emotional control: Appropriate to situation    Visual/perceptual:  Intact    Physical Exam:  Postural examination/scapula alignment: Rounded shoulder and Head forward  Skin integrity: Visible skin intact  Edema: None noted     Sensation:   Intact    Upper Extremity Range of Motion:  Right Upper Extremity: WFL  aarom  Left Upper Extremity: WFL aarom    Upper Extremity  "Strength:  Right Upper Extremity: Deficits: mmt: -2/5 grossly  Left Upper Extremity: Deficits: mmt: -2/5 GROSSLY   Strength: MMT: -2/5 GROSSLY    Fine motor coordination:   IMPAIRED     Gross motor coordination: IMPAIRED    Functional Mobility:  Bed Mobility:  Rolling/Turning to Left: Total assistance  Rolling/Turning Right: Total assistance  Scooting/Bridging: Total Assistance  Supine to Sit: Total Assistance  Sit to Supine: Total Assistance    Transfers:       Functional Ambulation: NA    Activities of Daily Living:     Feeding adaptive equipment: NA  UE Dressing Level of Assistance: Total assistance  UE adaptive equipment: NA  LE Dressing Level of Assistance: Total assistance  LE adaptive equipment: NA     Grooming Level of Assistance: Total assistance              Bathing adaptive equipment: NA    Balance:   Static Sit: POOR-  Dynamic Sit: 0: N/A  Static Stand: NA  Dynamic stand: NA    Therapeutic Activities and Exercises:      AM-PAC 6 CLICK ADL  How much help from another person does this patient currently need?  1 = Unable, Total/Dependent Assistance  2 = A lot, Maximum/Moderate Assistance  3 = A little, Minimum/Contact Guard/Supervision  4 = None, Modified [place]/Independent         AM-PAC Raw Score CMS "G-Code Modifier Level of Impairment Assistance   6 % Total / Unable   7 - 9 CM 80 - 100% Maximal Assist   10 - 14 CL 60 - 80% Moderate Assist   15 - 19 CK 40 - 60% Moderate Assist   20 - 22 CJ 20 - 40% Minimal Assist   23 CI 1-20% SBA / CGA   24 CH 0% Independent/ Mod I       Patient left HOB elevated with all lines intact, call button in reach and NURSE JASWINDER notified    Assessment:  Matty Nunn is a 36 y.o. male with a medical diagnosis of Acute respiratory failure with hypoxia and presents with DEBILITY AND GENERALIZED WEAKNESS. PT WILL BENEFIT FROM SKILLED O.T.    Rehab identified problem list/impairments: Rehab identified problem list/impairments: weakness, impaired " functional mobilty, impaired endurance, impaired self care skills, gait instability, decreased coordination, decreased upper extremity function, decreased safety awareness, decreased ROM    Rehab potential is fair.    Activity tolerance: Fair    Discharge recommendations: Discharge Facility/Level Of Care Needs: rehabilitation facility     Barriers to discharge:      Equipment recommendations: none     GOALS:   Occupational Therapy Goals        Problem: Occupational Therapy Goal    Goal Priority Disciplines Outcome Interventions   Occupational Therapy Goal     OT, PT/OT     Description:  OT GOALS TO BE MET BY 3-9-17  1.  MIN A WITH UE DRESSING  2.  MIN A WITH LE DRESSING  3. PT WILL TOLERATE 1 SET X 15 REPS B UE ROM EXERCISE SEATED EOB              PLAN:  Patient to be seen 3 x/week to address the above listed problems via self-care/home management, therapeutic exercises, therapeutic activities  Plan of Care expires:    Plan of Care reviewed with: patient         Coby Calvin, OT  03/02/2017

## 2017-03-02 NOTE — CONSULTS
Patient is being followed by nutrition services. Please see last follow up consult note dated 3/2/17 for full assessment and recommendations.

## 2017-03-02 NOTE — CONSULTS
[facility] -   Adult Nutrition  Consult Note    SUMMARY     Recommendations  Recommendation/Intervention: 1. SLP evaluation prior to diet advancement    2. If unable to advance diet within 3-5 days consider resuming enteral nutrition support  Goals: Oral diet or nutrition support within 3-5 days  Nutrition Goal Status: new  Communication of RD Recs: discussed on rounds    Nutrition Discharge Plan  Home on Regular (General Healthful) diet    Reason for Assessment  Reason for Assessment: RD follow-up  Diagnosis:  (Anaphylaxis)  Relevent Medical History: Gout   Interdisciplinary Rounds: attended  General Information Comments: Patient extubated, remains NPO    Nutrition Prescription Ordered  Current Diet Order: NPO  Current Nutrition Support Formula Ordered: Discontinued     Nutrition Risk Screen  Nutrition Risk Screen: no indicators present    Nutrition/Diet History  Typical Food/Fluid Intake: unable to obtain    Labs/Tests/Procedures/Meds  Pertinent Labs Reviewed: reviewed  Pertinent Labs Comments: Na 146, K 2.8, BUN 38, Gluc 181, AST 84,   Pertinent Medications Reviewed: reviewed    Physical Findings  Oral/Mouth Cavity:  (white swollen tongue)  Skin:  (Marcelo 13, Swelling)    Anthropometrics  Height (inches): 72.99 in  Weight Method: Bed Scale  Weight (kg): (!) 155.9 kg (11L negative fluid balance since admission)  Ideal Body Weight (IBW), Male: 183.94 lb  % Ideal Body Weight, Male (lb): 186.85 lb  BMI (kg/m2): 45.36  BMI Grade: greater than 40 - morbid obesity    Estimated/Assessed Needs  Weight Used For Calorie Calculations: (!) 156 kg (343 lb 14.7 oz)   Height (cm): 185.4 cm  Energy Need Method: Hurley-St Jeor (2549 calories)  20 kcal/kg (kcal): 3120   RMR (Hurley-St. Jeor Equation): 2548.99  Weight Used For Protein Calculations: 83.6 kg (184 lb 4.9 oz) (IBW used due to critical care obesity)  2.0 gm Protein (gm): 167.55 and 2.5 gm Protein (gm): 209.44  Fluid Need Method: RDA Method (1ml/gregg  or as needed)    Monitor and Evaluation  Food and Nutrient Intake: energy intake, food and beverage intake  Food and Nutrient Adminstration: diet order, enteral and parenteral nutrition administration  Anthropometric Measurements: weight  Biochemical Data, Medical Tests and Procedures: electrolyte and renal panel, glucose/endocrine profile    Nutrition Risk  Level of Risk:  (2x weekly)    Nutrition Follow-Up  RD Follow-up?: Yes      Assessment and Plan  * Acute respiratory failure with hypoxia, resolved as of 3/2/2017  Nutrition Problem:   Swallowing difficulty    Etiology/Related to:   Inability to safely consume oral diet    As evidenced by:   Mechanical ventilation and pharmacological sedation    Treatment Strategy:   1. Enteral nutrition support to meet needs until extubated    Nutrition Diagnosis Status:   Resolved

## 2017-03-02 NOTE — PLAN OF CARE
Problem: Patient Care Overview  Goal: Plan of Care Review  Outcome: Ongoing (interventions implemented as appropriate)  Recommendations  Recommendation/Intervention: 1. SLP evaluation prior to diet advancement 2. If unable to advance diet within 3-5 days consider resuming enteral nutrition support  Goals: Oral diet or nutrition support within 3-5 days  Nutrition Goal Status: new  Communication of RD Recs: discussed on rounds

## 2017-03-03 PROBLEM — T78.40XA ALLERGIC REACTION: Status: ACTIVE | Noted: 2017-03-03

## 2017-03-03 PROBLEM — J15.69 PNEUMONIA DUE TO GRAM-NEGATIVE BACTERIA: Status: RESOLVED | Noted: 2017-02-24 | Resolved: 2017-03-03

## 2017-03-03 PROBLEM — R78.81 POSITIVE BLOOD CULTURE: Status: ACTIVE | Noted: 2017-03-03

## 2017-03-03 LAB
ALBUMIN SERPL BCP-MCNC: 3.4 G/DL
ALP SERPL-CCNC: 48 U/L
ALT SERPL W/O P-5'-P-CCNC: 181 U/L
ANION GAP SERPL CALC-SCNC: 12 MMOL/L
AST SERPL-CCNC: 70 U/L
BASOPHILS # BLD AUTO: 0.02 K/UL
BASOPHILS NFR BLD: 0.2 %
BILIRUB SERPL-MCNC: 1.2 MG/DL
BUN SERPL-MCNC: 32 MG/DL
CALCIUM SERPL-MCNC: 9.8 MG/DL
CHLORIDE SERPL-SCNC: 101 MMOL/L
CO2 SERPL-SCNC: 32 MMOL/L
CREAT SERPL-MCNC: 1.1 MG/DL
DIFFERENTIAL METHOD: ABNORMAL
EOSINOPHIL # BLD AUTO: 0.3 K/UL
EOSINOPHIL NFR BLD: 2.1 %
ERYTHROCYTE [DISTWIDTH] IN BLOOD BY AUTOMATED COUNT: 14 %
EST. GFR  (AFRICAN AMERICAN): >60 ML/MIN/1.73 M^2
EST. GFR  (NON AFRICAN AMERICAN): >60 ML/MIN/1.73 M^2
GLUCOSE SERPL-MCNC: 139 MG/DL
HCT VFR BLD AUTO: 49.7 %
HGB BLD-MCNC: 16.3 G/DL
LYMPHOCYTES # BLD AUTO: 2.1 K/UL
LYMPHOCYTES NFR BLD: 17.1 %
MCH RBC QN AUTO: 31.2 PG
MCHC RBC AUTO-ENTMCNC: 32.8 %
MCV RBC AUTO: 95 FL
MONOCYTES # BLD AUTO: 0.7 K/UL
MONOCYTES NFR BLD: 6.1 %
NEUTROPHILS # BLD AUTO: 9 K/UL
NEUTROPHILS NFR BLD: 74.5 %
PLATELET # BLD AUTO: 228 K/UL
PMV BLD AUTO: 9.2 FL
POCT GLUCOSE: 132 MG/DL (ref 70–110)
POCT GLUCOSE: 158 MG/DL (ref 70–110)
POCT GLUCOSE: 172 MG/DL (ref 70–110)
POCT GLUCOSE: 76 MG/DL (ref 70–110)
POCT GLUCOSE: 80 MG/DL (ref 70–110)
POCT GLUCOSE: 88 MG/DL (ref 70–110)
POTASSIUM SERPL-SCNC: 3.5 MMOL/L
PROT SERPL-MCNC: 8.3 G/DL
RBC # BLD AUTO: 5.22 M/UL
SODIUM SERPL-SCNC: 145 MMOL/L
VANCOMYCIN TROUGH SERPL-MCNC: 9.1 UG/ML
WBC # BLD AUTO: 12.04 K/UL

## 2017-03-03 PROCEDURE — 25000003 PHARM REV CODE 250: Performed by: INTERNAL MEDICINE

## 2017-03-03 PROCEDURE — 25000003 PHARM REV CODE 250: Performed by: NURSE PRACTITIONER

## 2017-03-03 PROCEDURE — 94640 AIRWAY INHALATION TREATMENT: CPT

## 2017-03-03 PROCEDURE — 97530 THERAPEUTIC ACTIVITIES: CPT

## 2017-03-03 PROCEDURE — 99233 SBSQ HOSP IP/OBS HIGH 50: CPT | Mod: ,,, | Performed by: INTERNAL MEDICINE

## 2017-03-03 PROCEDURE — 80053 COMPREHEN METABOLIC PANEL: CPT

## 2017-03-03 PROCEDURE — 63600175 PHARM REV CODE 636 W HCPCS: Performed by: NURSE PRACTITIONER

## 2017-03-03 PROCEDURE — 27000221 HC OXYGEN, UP TO 24 HOURS

## 2017-03-03 PROCEDURE — 85025 COMPLETE CBC W/AUTO DIFF WBC: CPT

## 2017-03-03 PROCEDURE — 80202 ASSAY OF VANCOMYCIN: CPT

## 2017-03-03 PROCEDURE — 63600175 PHARM REV CODE 636 W HCPCS: Performed by: INTERNAL MEDICINE

## 2017-03-03 PROCEDURE — 21400001 HC TELEMETRY ROOM

## 2017-03-03 PROCEDURE — 25000242 PHARM REV CODE 250 ALT 637 W/ HCPCS: Performed by: INTERNAL MEDICINE

## 2017-03-03 PROCEDURE — 97110 THERAPEUTIC EXERCISES: CPT

## 2017-03-03 PROCEDURE — 36415 COLL VENOUS BLD VENIPUNCTURE: CPT

## 2017-03-03 RX ORDER — IPRATROPIUM BROMIDE AND ALBUTEROL SULFATE 2.5; .5 MG/3ML; MG/3ML
3 SOLUTION RESPIRATORY (INHALATION) EVERY 6 HOURS PRN
Status: DISCONTINUED | OUTPATIENT
Start: 2017-03-03 | End: 2017-03-08 | Stop reason: HOSPADM

## 2017-03-03 RX ADMIN — VANCOMYCIN HYDROCHLORIDE 1000 MG: 1 INJECTION, POWDER, LYOPHILIZED, FOR SOLUTION INTRAVENOUS at 10:03

## 2017-03-03 RX ADMIN — METOCLOPRAMIDE 10 MG: 5 INJECTION, SOLUTION INTRAMUSCULAR; INTRAVENOUS at 05:03

## 2017-03-03 RX ADMIN — METOPROLOL TARTRATE 25 MG: 25 TABLET ORAL at 10:03

## 2017-03-03 RX ADMIN — IPRATROPIUM BROMIDE AND ALBUTEROL SULFATE 3 ML: .5; 3 SOLUTION RESPIRATORY (INHALATION) at 01:03

## 2017-03-03 RX ADMIN — VANCOMYCIN HYDROCHLORIDE 1000 MG: 1 INJECTION, POWDER, LYOPHILIZED, FOR SOLUTION INTRAVENOUS at 07:03

## 2017-03-03 RX ADMIN — METOPROLOL TARTRATE 25 MG: 25 TABLET ORAL at 08:03

## 2017-03-03 RX ADMIN — IPRATROPIUM BROMIDE AND ALBUTEROL SULFATE 3 ML: .5; 3 SOLUTION RESPIRATORY (INHALATION) at 08:03

## 2017-03-03 RX ADMIN — FAMOTIDINE 20 MG: 20 TABLET ORAL at 10:03

## 2017-03-03 RX ADMIN — FAMOTIDINE 20 MG: 20 TABLET ORAL at 08:03

## 2017-03-03 RX ADMIN — ENOXAPARIN SODIUM 40 MG: 100 INJECTION SUBCUTANEOUS at 12:03

## 2017-03-03 RX ADMIN — INSULIN ASPART 2 UNITS: 100 INJECTION, SOLUTION INTRAVENOUS; SUBCUTANEOUS at 12:03

## 2017-03-03 RX ADMIN — AMLODIPINE BESYLATE 5 MG: 5 TABLET ORAL at 10:03

## 2017-03-03 RX ADMIN — FUROSEMIDE 40 MG: 10 INJECTION, SOLUTION INTRAMUSCULAR; INTRAVENOUS at 10:03

## 2017-03-03 RX ADMIN — VANCOMYCIN HYDROCHLORIDE 1000 MG: 1 INJECTION, POWDER, LYOPHILIZED, FOR SOLUTION INTRAVENOUS at 03:03

## 2017-03-03 NOTE — PROGRESS NOTES
[facility] -   Critical Care Medicine  Progress Note    Patient Name: Matty Nunn  MRN: [id number]  Admission Date: 2/22/2017  Hospital Length of Stay: 9 days  Code Status: Full Code  Attending Provider: Macey Hargrove MD  Primary Care Provider: Ruddy Noble MD   Principal Problem: Pneumonia due to Gram-negative bacteria    Subjective:     HPI:  Intubated for acute respiratory failure     Hospital/ICU Course:   SP extubation now Day 2  Episode of nasuea     Interval History/Significant Events:   Elevated BP: cardene gtt weaned       Review of Systems   Constitutional: Negative for fatigue and fever.   HENT: Negative.    Eyes: Negative.    Respiratory: Negative.  Negative for cough, shortness of breath and wheezing.    Cardiovascular: Negative.    Gastrointestinal: Negative.    Endocrine: Negative.    Genitourinary: Negative.    Musculoskeletal: Negative.    Neurological: Negative.    Hematological: Negative.    Psychiatric/Behavioral: Negative.      Objective:     Vital Signs (Most Recent):  Temp: 98.9 °F (37.2 °C) (03/03/17 0803)  Pulse: 107 (03/03/17 1002)  Resp: 16 (03/03/17 1002)  BP: (!) 132/97 (03/03/17 1002)  SpO2: (!) 92 % (03/03/17 1002) Vital Signs (24h Range):  Temp:  [97.9 °F (36.6 °C)-99.1 °F (37.3 °C)] 98.9 °F (37.2 °C)  Pulse:  [] 107  Resp:  [13-23] 16  SpO2:  [92 %-100 %] 92 %  BP: (108-145)/(53-97) 132/97     Weight: (!) 152.9 kg (337 lb 1.3 oz)  Body mass index is 44.47 kg/(m^2).      Intake/Output Summary (Last 24 hours) at 03/03/17 1352  Last data filed at 03/03/17 1200   Gross per 24 hour   Intake             2200 ml   Output             2815 ml   Net             -615 ml       Physical Exam   Constitutional: He is oriented to person, place, and time. He appears well-developed and well-nourished. No distress.   HENT:   Head: Normocephalic and atraumatic.   Nose: Nose normal.   Mouth/Throat: Oropharynx is clear and moist. No oropharyngeal exudate.   Eyes:  Conjunctivae and EOM are normal. Pupils are equal, round, and reactive to light. Left eye exhibits no discharge.   Neck: Normal range of motion. Neck supple. No JVD present. No tracheal deviation present. No thyromegaly present.   Cardiovascular: Normal rate, regular rhythm, normal heart sounds and intact distal pulses.    No murmur heard.  Pulmonary/Chest: Effort normal and breath sounds normal. No respiratory distress. He has no wheezes.   Abdominal: Soft. Bowel sounds are normal. He exhibits no distension.   Musculoskeletal: Normal range of motion. He exhibits no deformity.   Neurological: He is alert and oriented to person, place, and time. No cranial nerve deficit.   Skin: Skin is warm and dry.   Psychiatric: He has a normal mood and affect. His behavior is normal.   Nursing note and vitals reviewed.      Vents:  Vent Mode: Spont (03/01/17 1441)  Ventilator Initiated: Yes (02/22/17 0845)  Set Rate: 0 bmp (03/01/17 1441)  Vt Set: 750 mL (03/01/17 1441)  Pressure Support: 12 cmH20 (03/01/17 1441)  PEEP/CPAP: 5 cmH20 (03/01/17 1441)  Oxygen Concentration (%): 28 (03/03/17 0118)  Peak Airway Pressure: 18 cmH2O (03/01/17 1441)  Total Ve: 9.79 mL (03/01/17 1441)  F/VT Ratio<105 (RSBI): (!) 29.85 (03/01/17 1441)    Lines/Drains/Airways     Drain                 Urethral Catheter 02/22/17 1014 Latex 16 Fr. 8 days          Peripheral Intravenous Line                 Peripheral IV - Single Lumen 02/24/17 1539 Left Antecubital 6 days         Peripheral IV - Single Lumen 02/24/17 1539 Right Hand 6 days                Significant Labs:    CBC/Anemia Profile:    Recent Labs  Lab 03/02/17  0539 03/03/17  0503   WBC 13.75* 12.04   HGB 16.3 16.3   HCT 48.2 49.7    228   MCV 93 95   RDW 13.8 14.0        Chemistries:    Recent Labs  Lab 03/02/17  0539 03/03/17  0503   * 145   K 2.8* 3.5   CL 99 101   CO2 31* 32*   BUN 38* 32*   CREATININE 1.0 1.1   CALCIUM 9.8 9.8   ALBUMIN 3.7 3.4*   PROT 8.1 8.3   BILITOT 1.6* 1.2*    ALKPHOS 51* 48*   * 181*   AST 84* 70*       Blood Culture: No results for input(s): LABBLOO in the last 48 hours.  POCT Glucose:   Recent Labs  Lab 03/03/17  0152 03/03/17  0506 03/03/17  1253   POCTGLUCOSE 88 132* 172*     Respiratory Culture: No results for input(s): GSRESP, RESPIRATORYC in the last 48 hours.  All pertinent labs within the past 24 hours have been reviewed.    Significant Imaging:  I have reviewed and interpreted all pertinent imaging results/findings within the past 24 hours.    Assessment/Plan:     Active Diagnoses:    Diagnosis Date Noted POA    Allergic reaction [T78.40XA] 03/03/2017 Yes    Positive blood culture [R78.81] 03/03/2017 No    Anaphylaxis [T78.2XXA] 02/22/2017 Yes      Problems Resolved During this Admission:    Diagnosis Date Noted Date Resolved POA    PRINCIPAL PROBLEM:  Pneumonia due to Gram-negative bacteria [J15.6] 02/24/2017 03/03/2017 Yes    Acute respiratory failure with hypoxia [J96.01] 02/24/2017 03/02/2017 Yes    Hyperglycemia, drug-induced [R73.9] 02/24/2017 03/02/2017 No    Allergic reaction [T78.40XA] 02/22/2017 03/01/2017 Yes    Abscess of axilla, right [L02.411] 02/22/2017 03/01/2017 Yes     PLAN:      1. Neuro:   Alert . RASS -0 awakes: alert and calm  ICU neuro monitoring.      2. Pulmonary:   Stable. Keep sats  > 90 - 92 %  Incentive spirometry  Deep breathing  Bronchodilators as needed.       3. Cardiac:   Stable.  Normotensive regular rate and rhythm   Monitor hemodynamics and monitor for dysrhythmias MAP goal of 65 mmHg.   IV lasix  Norvasc 5 mg.  Metoprolol 25 mg BID      4. Renal:   Stable. Daily weights and strict I/O's I  Mcgill in place,  monitor I/Os         5. Infectious Disease:   Improving. Monitor fever curve and sepsis surveillance .   Coag -ve staph likely contamination      6. Hematology/Oncology:   Stable.   Monitor for bleeding  Platelets and h&H stable      7. Endocrine: INSULIN sliding scale.   Blood glucose target 100 - 180  mg/dl      8. Fluids/Electrolytes/Nutrition/GI:   Monitor and replete electrolytes    Maintain even fluid balance.  Dulcolax      9. Musculoskeletal:  Stable. Physical Therapy and Occupational Therapy      10. Pain Management:   Pain control adequate.   Analgesia per ICU protocol.  Off drips      11. Discharge and Palliative Care: Home self care.      OKAY for transfer     Critical Care Medicine Daily Checklist:    A: Awake: RASS Goal/Actual Goal: RASS Goal: 0-->alert and calm  Actual: [place] Agitation Sedation Scale (RASS): Alert and calm   B: Spontaneous Breathing Trial Performed?     C: SAT & SBT Coordinated?  N/A                      D: Delirium: CAM-ICU Overall CAM-ICU: Negative   E: Early Mobility Performed? Yes   F: Feeding Goal: Goals: Oral diet or nutrition support within 3-5 days  Status: Nutrition Goal Status: new   Current Diet Order   Procedures    Diet full liquid      AS: Analgesia/Sedation Per MICU protocol   T: Thromboembolic Prophylaxis YES   H: HOB > 300 Yes   U: Stress Ulcer Prophylaxis (if needed) YES   G: Glucose Control 172   B: Bowel Function Stool Occurrence: 1   I: Indwelling Catheter (Lines & Mcgill) Necessity Peripheral IV   D: De-escalation of Antimicrobials/Pharmacotherapies Vancomycin will dc in second set of blood cultures negative    Plan for the day/ETD Transfer to floor    Code Status:  Family/Goals of Care: Full Code  Home         Critical care was time spent personally by me on the following activities: development of treatment plan with patient or surrogate and bedside caregivers, discussions with consultants, evaluation of patient's response to treatment, examination of patient, ordering and performing treatments and interventions, ordering and review of laboratory studies, ordering and review of radiographic studies, pulse oximetry, re-evaluation of patient's condition.  This critical care time did not overlap with that of any other provider or involve time for any  procedures.     Eduardo Ortega MD  Critical Care Medicine  [facility]

## 2017-03-03 NOTE — PLAN OF CARE
Problem: Patient Care Overview  Goal: Plan of Care Review  Outcome: Ongoing (interventions implemented as appropriate)  Plan of care review with patient, vital signs stable, sinus rhythm on monitor, alert and oriented to self , place, situation. Blisters on bilateral feet( plantar), rashes all over the body. Tolerated full liquid diet with no nausea no vomiting.

## 2017-03-03 NOTE — PLAN OF CARE
Problem: Patient Care Overview  Goal: Plan of Care Review  Outcome: Ongoing (interventions implemented as appropriate)  IMPROVED FUNCTIONAL MOBILITY TODAY, MAXA FOR TF TO CHAIR

## 2017-03-03 NOTE — PLAN OF CARE
Problem: Patient Care Overview  Goal: Plan of Care Review  Outcome: Ongoing (interventions implemented as appropriate)  Pt doing better today.  cardene drip weaned off this am.  VSS.  Pt unable to get oob to chair today with PT after two attempts.  Pt stronger this aftgernoon than this morning.  Pt continues to be slightly confused this afternoon, but easily reoriented.  Pt had very small bm today.  Plan is to get pt up and out of bed tomorrow.

## 2017-03-03 NOTE — PLAN OF CARE
Problem: Occupational Therapy Goal  Goal: Occupational Therapy Goal  OT GOALS TO BE MET BY 3-9-17  1. MIN A WITH UE DRESSING  2. MIN A WITH LE DRESSING  3. PT WILL TOLERATE 1 SET X 15 REPS B UE ROM EXERCISE SEATED EOB   Outcome: Ongoing (interventions implemented as appropriate)  Pt max A with LE dressing seated EOB. More alert and communicative today. Bed mobility max A. Sit to stand max A. Transferred to bedside chair max A. Performed AAROM exercises with BUE's in all planes to increased mobility and prevent soft tissue shortening.

## 2017-03-03 NOTE — PT/OT/SLP PROGRESS
Occupational Therapy  Treatment    Matty Nunn   MRN: [id number]   Admitting Diagnosis: Pneumonia due to Gram-negative bacteria    OT Date of Treatment: 17   OT Start Time: 835  OT Stop Time: 902  OT Total Time (min): 27 min    Billable Minutes:  Therapeutic Activity 15 and Therapeutic Exercise 10    General Precautions: Standard, fall  Orthopedic Precautions: N/A  Braces: N/A         Subjective:  Communicated with nurse prior to session.      Pain Ratin/10              Pain Rating Post-Intervention: 0/10    Objective:  Patient found with: telemetry, peripheral IV, pulse ox (continuous), oxygen, blood pressure cuff, crisostomo catheter, NG tube     Functional Mobility:  Bed Mobility:  Rolling/Turning to Left: Maximum assistance  Rolling/Turning Right: Maximum assistance  Scooting/Bridging: Maximum Assistance  Supine to Sit: Maximum Assistance    Transfers:   Sit <> Stand Assistance: Maximum Assistance  Sit <> Stand Assistive Device: No Assistive Device  Bed <> Chair Technique: Stand Pivot  Bed <> Chair Transfer Assistance: Maximum Assistance  Bed <> Chair Assistive Device: No Assistive Device    Functional Ambulation: Stood max A, took ~2-3 steps to transfer to bedside chair max A.    Activities of Daily Living:     LE Dressing Level of Assistance: Maximum assistance    Grooming Position: EOB  Grooming Level of Assistance: Moderate assistance ([place] assistance with bring hand to face)         Balance:   Static Sit: FAIR-: Maintains without assist but inconsistent   Dynamic Sit: POOR: N/A  Static Stand: 0: Needs MAXIMAL assist to maintain   Dynamic stand: 0: N/A    Therapeutic Activities and Exercises:  Pt completed AAROM to BUE's with mod A to achieve maximum ROM, x10 reps each to increase mobility and prevent soft tissue shortening.     AM-PAC 6 CLICK ADL   How much help from another person does this patient currently need?   1 = Unable, Total/Dependent Assistance  2 = A lot, Maximum/Moderate  Assistance  3 = A little, Minimum/Contact Guard/Supervision  4 = None, Modified [place]/Independent          AM-PAC Raw Score CMS G-Code Modifier Level of Impairment Assistance   6 % Total / Unable   7 - 9 CM 80 - 100% Maximal Assist   10 - 14 CL 60 - 80% Moderate Assist   15 - 19 CK 40 - 60% Moderate Assist   20 - 22 CJ 20 - 40% Minimal Assist   23 CI 1-20% SBA / CGA   24 CH 0% Independent/ Mod I     Patient left up in chair with all lines intact, call button in reach and nurse notified    ASSESSMENT:  Matty Nunn is a 36 y.o. male with a medical diagnosis of Pneumonia due to Gram-negative bacteria and presents with debility, impaired ADLs and safety. Pt will continue to benefit from skilled OT services to increase functional independence and safety.    Rehab identified problem list/impairments: Rehab identified problem list/impairments: weakness, impaired endurance, impaired self care skills, impaired functional mobilty, gait instability, impaired balance, decreased coordination, decreased upper extremity function, decreased lower extremity function, decreased safety awareness, decreased ROM, impaired coordination    Rehab potential is good.    Activity tolerance: Fair    Discharge recommendations: Discharge Facility/Level Of Care Needs: rehabilitation facility     Barriers to discharge: Barriers to Discharge: Decreased caregiver support    Equipment recommendations:  (To be determined based on progress)     GOALS:   Occupational Therapy Goals        Problem: Occupational Therapy Goal    Goal Priority Disciplines Outcome Interventions   Occupational Therapy Goal     OT, PT/OT Ongoing (interventions implemented as appropriate)    Description:  OT GOALS TO BE MET BY 3-9-17  1.  MIN A WITH UE DRESSING  2.  MIN A WITH LE DRESSING  3. PT WILL TOLERATE 1 SET X 15 REPS B UE ROM EXERCISE SEATED EOB              Plan:  Patient to be seen 3 x/week to address the above listed problems via  self-care/home management, therapeutic activities, therapeutic exercises  Plan of Care expires: 03/09/17  Plan of Care reviewed with: patient         Purvi Martinez, OT  03/03/2017

## 2017-03-03 NOTE — PLAN OF CARE
Patient transferred to telemetry floor today. CM noted PT suggestion for possible rehab. Discussed this with patient and spouse. Printed rehab facilities information given to spouse for reviewing. Spouse express concerned of patient traveling home to [place]([address]) for inpatient/outpatinet rehab or remaining in [place]. CM discussed spouses concerns with . Dr. Hargrove stated she will read PT notes then discuss spouses concerns with patient and spouse. Spouse and patient informed .     03/03/17 1529   Discharge Reassessment   Assessment Type Discharge Planning Reassessment   Can the patient answer the patient profile reliably? Yes, cognitively intact   How does the patient rate their overall health at the present time? Good   Describe the patient's ability to walk at the present time. Minor restrictions or changes   How often would a person be available to care for the patient? Whenever needed   Discharge plan remains the same: (see CM note)   Discharge Plan A Rehab   Discharge Plan B Home with family;Rehab   Change in patient condition or support system Yes   Involved the patient/caregiver in establishing a new discharge plan: Yes

## 2017-03-03 NOTE — PROGRESS NOTES
PROGRESS NOTE  HOSPITAL MEDICINE    Admit Date: 2/22/2017    Follow-up For:  Pneumonia due to Gram-negative bacteria     Subjective:        Interval History:    Patient is a 35 yo male with a hx of gout presented to [facility] ER on 2/21/17 with a right axillary abscess which was I&D'd in the ER. sent home on Bactrim.   He returned to ER in respiratory distress and subsequently intubated.   Patient was successfully extubated 03/01/17       Review of Systems -shortness of breath with improvement     Objective:      Vital Signs Range (Last 24H):  Temp:  [97.9 °F (36.6 °C)-99.1 °F (37.3 °C)]   Pulse:  []   Resp:  [13-23]   BP: (108-145)/(53-97)   SpO2:  [92 %-100 %]     I & O (Last 24H):    Intake/Output Summary (Last 24 hours) at 03/03/17 1322  Last data filed at 03/03/17 1016   Gross per 24 hour   Intake             1960 ml   Output             2415 ml   Net             -455 ml       Physical Exam   Constitutional: appears well nourished  Head: Normocephalic and atraumatic.   Neck: Neck supple. No mass  Cardiovascular: Normal rate and regular rhythm.    Pulmonary/Chest: Good air entry bilaterally, decrease at the bases  Abdomen: Soft. Non-tender and non-distended. Bowel sounds are normal.   Neurological: Moves all extremities.  Skin: Warm and dry.  diffuse rash     Extremities: No clubbing cyanosis or edema.    Medications:  Current Facility-Administered Medications   Medication    acetaminophen oral solution 650 mg    albuterol-ipratropium 2.5mg-0.5mg/3mL nebulizer solution 3 mL    amlodipine tablet 5 mg    bisacodyl suppository 10 mg    dextrose 50% injection 12.5 g    enoxaparin injection 40 mg    famotidine tablet 20 mg    furosemide injection 40 mg    glucagon (human recombinant) injection 1 mg    hydrALAZINE injection 20 mg    influenza vaccine 60 mcg (15 mcg x 4)/0.5 mL (for patients > 36 months) injection    insulin aspart pen 1-10 Units    insulin detemir pen 10 Units    lorazepam  injection 1 mg    metoclopramide HCl injection 10 mg    metoprolol tartrate (LOPRESSOR) tablet 25 mg    niCARdipine 40 mg/200 mL infusion    ondansetron injection 4 mg    pneumoc 13-hamida conj-dip cr(PF) 0.5 mL 0.5 mL    polyethylene glycol packet 17 g    vancomycin 1 g in dextrose 5 % 250 mL IVPB (ready to mix system)       Laboratory Data:  Reviewed and noted in plan where applicable. Please see chart for full laboratory data.    Lab Results   Component Value Date    WBC 12.04 03/03/2017    HGB 16.3 03/03/2017    HCT 49.7 03/03/2017    MCV 95 03/03/2017     03/03/2017         Recent Labs  Lab 03/03/17  0503   *      K 3.5      CO2 32*   BUN 32*   CREATININE 1.1   CALCIUM 9.8       Microbiology Results (last 7 days)     Procedure Component Value Units Date/Time    Blood culture [[id number]] Collected:  03/01/17 0923    Order Status:  Completed Specimen:  Blood Updated:  03/03/17 1135     Blood Culture, Routine Gram stain aer bottle: Gram positive cocci in clusters resembling Staph     Blood Culture, Routine Results called to and read back by:Madi Chakraborty RN 03/02/2017  16:30     Blood Culture, Routine --     COAGULASE-NEGATIVE STAPHYLOCOCCUS SPECIES  Organism is a probable contaminant      Blood culture [[id number]] Collected:  03/02/17 1841    Order Status:  Sent Specimen:  Blood Updated:  03/03/17 0328    Blood Culture #1 **CANNOT BE ORDERED STAT** [[id number]] Collected:  02/22/17 1030    Order Status:  Completed Specimen:  Blood from Peripheral, Hand, Right Updated:  02/27/17 2212     Blood Culture, Routine No growth after 5 days.    Blood Culture #2 **CANNOT BE ORDERED STAT** [[id number]] Collected:  02/22/17 1045    Order Status:  Completed Specimen:  Blood from Peripheral, Hand, Right Updated:  02/27/17 2212     Blood Culture, Routine No growth after 5 days.    Culture, Respiratory with Gram Stain [[id number]] Collected:  02/23/17 1647    Order Status:  Completed Specimen:   Respiratory from Sputum, Induced Updated:  02/27/17 1140     Respiratory Culture Normal respiratory roxana     Gram Stain (Respiratory) >10 epithelial cells per low power field     Gram Stain (Respiratory) Many WBC's     Gram Stain (Respiratory) Many Gram negative rods     Gram Stain (Respiratory) Few Gram positive cocci     Gram Stain (Respiratory) Rare Gram negative diplococci    Narrative:       Endotracheal tube suction          Radiology:  Reviewed and noted in plan where applicable.  Please see chart for full radiology data.    ASSESSMENT/PLAN:     Active Hospital Problems    Diagnosis  POA    *Pneumonia due to Gram-negative bacteria [J15.6]  Yes    Allergic reaction [T78.40XA]  Yes    Anaphylaxis [T78.2XXA]  Yes      Resolved Hospital Problems    Diagnosis Date Resolved POA    Acute respiratory failure with hypoxia [J96.01] 03/02/2017 Yes    Hyperglycemia, drug-induced [R73.9] 03/02/2017 No    Allergic reaction [T78.40XA] 03/01/2017 Yes    Abscess of axilla, right [L02.411] 03/01/2017 Yes     VDRF due to Pneumonia      succesful extubation   CT chest negative for embolism but segmental consolidation bilaterally in the      bases worse on the right.    follow up CXR today shows improvement     Uncontrolled hypertension     improved    Norvasc and lopressor       monitor       Allergic reaction  Associated with Bactrim initiation     Abscess of axilla, right  Initially prescribed Bactrim   Healing well     Hypokalemia    replace     Diffuse skin rash    Possibly due to drug reaction        Pneumonia due to Gram-negative bacteremia    CTA suggest lower lobe consolidation/atelectasis   continue antibiotic coverage       Hyperglycemia  Accuchecks and SSI       Disposition -stable for transfer to  Telemetry                       Critical care   Time spent  30 min

## 2017-03-03 NOTE — CONSULTS
Pharmacy Vancomycin Consult Note    WBC=13.75  Tmax=99.7  CrCl=160.9ml/min Scr=1  WAS=968.9kg IBW=79.9kg FSJ=254.3kg    Dx. Bacteremia  Cultures: Blood-gram (+) cocci resembling staph  Goal trough=15-20mcg/ml    Patient received Vanc 2000mg one time load.  Patient was previously on Vanc 1500mg Q12 and  vancomycin trough=5.6mcg/ml  Start patient on Vanc 1 gram Q8.  Trough due 3/3 @1800 before 4th dose.    Thank you for allowing us to participate in this patient's care.    Yesenia Araujo, Pharm D 3/2/2017 8:21 PM

## 2017-03-03 NOTE — PT/OT/SLP PROGRESS
Physical Therapy      Matty Nunn  MRN: [id number]    S: PT AGREEABLE TO BLE THEREX  O: PT FOUND SEATED IN CHAIR UPON ARRIVAL, NO C/O PAIN.  PT EDUCATED IN AND PERFORMED BLE THEREX X 20 REPS AROM WITH REST BREAKS.  PT LEFT SEATED IN CHAIR WITH ALL NEEDS MET. PT ENCOURAGED TO CALL FOR ASSISTANCE WITH ALL NEEDS DUE TO FALL RISK STATUS, PT AGREEABLE.  PT ENCOURAGED TO INCREASE TIME OOB IN CHAIR, ALL MEALS IN CHAIR OOB, PT AGREEABLE  A: GOOD PARTICIPATION  P: CONT PER POC    Renae Noriega, PT   3/3/2017  [id number]

## 2017-03-04 LAB
ALBUMIN SERPL BCP-MCNC: 3.1 G/DL
ALP SERPL-CCNC: 47 U/L
ALT SERPL W/O P-5'-P-CCNC: 140 U/L
ANION GAP SERPL CALC-SCNC: 10 MMOL/L
AST SERPL-CCNC: 48 U/L
BACTERIA BLD CULT: NORMAL
BASOPHILS # BLD AUTO: 0.01 K/UL
BASOPHILS NFR BLD: 0.1 %
BILIRUB SERPL-MCNC: 1.4 MG/DL
BUN SERPL-MCNC: 29 MG/DL
CALCIUM SERPL-MCNC: 9.2 MG/DL
CHLORIDE SERPL-SCNC: 99 MMOL/L
CO2 SERPL-SCNC: 30 MMOL/L
CREAT SERPL-MCNC: 0.8 MG/DL
DIFFERENTIAL METHOD: ABNORMAL
EOSINOPHIL # BLD AUTO: 0.3 K/UL
EOSINOPHIL NFR BLD: 2.9 %
ERYTHROCYTE [DISTWIDTH] IN BLOOD BY AUTOMATED COUNT: 13.3 %
EST. GFR  (AFRICAN AMERICAN): >60 ML/MIN/1.73 M^2
EST. GFR  (NON AFRICAN AMERICAN): >60 ML/MIN/1.73 M^2
GLUCOSE SERPL-MCNC: 128 MG/DL
HCT VFR BLD AUTO: 47.3 %
HGB BLD-MCNC: 15.4 G/DL
LYMPHOCYTES # BLD AUTO: 1.9 K/UL
LYMPHOCYTES NFR BLD: 15.8 %
MCH RBC QN AUTO: 30.6 PG
MCHC RBC AUTO-ENTMCNC: 32.6 %
MCV RBC AUTO: 94 FL
MONOCYTES # BLD AUTO: 0.9 K/UL
MONOCYTES NFR BLD: 7.1 %
NEUTROPHILS # BLD AUTO: 8.8 K/UL
NEUTROPHILS NFR BLD: 74.1 %
PLATELET # BLD AUTO: 230 K/UL
PMV BLD AUTO: 8.9 FL
POCT GLUCOSE: 111 MG/DL (ref 70–110)
POCT GLUCOSE: 114 MG/DL (ref 70–110)
POCT GLUCOSE: 139 MG/DL (ref 70–110)
POCT GLUCOSE: 45 MG/DL (ref 70–110)
POCT GLUCOSE: 76 MG/DL (ref 70–110)
POTASSIUM SERPL-SCNC: 3.2 MMOL/L
PROT SERPL-MCNC: 6.8 G/DL
RBC # BLD AUTO: 5.04 M/UL
SODIUM SERPL-SCNC: 139 MMOL/L
WBC # BLD AUTO: 11.92 K/UL

## 2017-03-04 PROCEDURE — 80053 COMPREHEN METABOLIC PANEL: CPT

## 2017-03-04 PROCEDURE — 27000221 HC OXYGEN, UP TO 24 HOURS

## 2017-03-04 PROCEDURE — 25000003 PHARM REV CODE 250: Performed by: INTERNAL MEDICINE

## 2017-03-04 PROCEDURE — 63600175 PHARM REV CODE 636 W HCPCS: Performed by: NURSE PRACTITIONER

## 2017-03-04 PROCEDURE — 97110 THERAPEUTIC EXERCISES: CPT

## 2017-03-04 PROCEDURE — 63600175 PHARM REV CODE 636 W HCPCS: Performed by: INTERNAL MEDICINE

## 2017-03-04 PROCEDURE — 99232 SBSQ HOSP IP/OBS MODERATE 35: CPT | Mod: ,,, | Performed by: INTERNAL MEDICINE

## 2017-03-04 PROCEDURE — 36415 COLL VENOUS BLD VENIPUNCTURE: CPT

## 2017-03-04 PROCEDURE — 25000003 PHARM REV CODE 250: Performed by: NURSE PRACTITIONER

## 2017-03-04 PROCEDURE — 85025 COMPLETE CBC W/AUTO DIFF WBC: CPT

## 2017-03-04 PROCEDURE — 21400001 HC TELEMETRY ROOM

## 2017-03-04 PROCEDURE — 97116 GAIT TRAINING THERAPY: CPT

## 2017-03-04 RX ORDER — POTASSIUM CHLORIDE 20 MEQ/1
40 TABLET, EXTENDED RELEASE ORAL ONCE
Status: COMPLETED | OUTPATIENT
Start: 2017-03-04 | End: 2017-03-04

## 2017-03-04 RX ADMIN — FUROSEMIDE 40 MG: 10 INJECTION, SOLUTION INTRAMUSCULAR; INTRAVENOUS at 10:03

## 2017-03-04 RX ADMIN — ENOXAPARIN SODIUM 40 MG: 100 INJECTION SUBCUTANEOUS at 01:03

## 2017-03-04 RX ADMIN — METOPROLOL TARTRATE 25 MG: 25 TABLET ORAL at 10:03

## 2017-03-04 RX ADMIN — POTASSIUM CHLORIDE 40 MEQ: 1500 TABLET, EXTENDED RELEASE ORAL at 08:03

## 2017-03-04 RX ADMIN — VANCOMYCIN HYDROCHLORIDE 1000 MG: 1 INJECTION, POWDER, LYOPHILIZED, FOR SOLUTION INTRAVENOUS at 03:03

## 2017-03-04 RX ADMIN — FAMOTIDINE 20 MG: 20 TABLET ORAL at 10:03

## 2017-03-04 RX ADMIN — AMLODIPINE BESYLATE 5 MG: 5 TABLET ORAL at 10:03

## 2017-03-04 RX ADMIN — VANCOMYCIN HYDROCHLORIDE 1000 MG: 1 INJECTION, POWDER, LYOPHILIZED, FOR SOLUTION INTRAVENOUS at 01:03

## 2017-03-04 RX ADMIN — FAMOTIDINE 20 MG: 20 TABLET ORAL at 08:03

## 2017-03-04 RX ADMIN — METOPROLOL TARTRATE 25 MG: 25 TABLET ORAL at 08:03

## 2017-03-04 RX ADMIN — POLYETHYLENE GLYCOL 3350 17 G: 17 POWDER, FOR SOLUTION ORAL at 10:03

## 2017-03-04 NOTE — PLAN OF CARE
Problem: Patient Care Overview  Goal: Individualization & Mutuality  1. P.T. NOTE: PT CURRENTLY REQUIRES TOTAL ASSIST X 3 STAFF, LIMITED BY DIZZINESS SO UNABLE TO TOLERATE SITTING AT THIS TIME   Outcome: Ongoing (interventions implemented as appropriate)  DX: anaphylaxis   PATIENT REMAINS FREE FROM FALLS, FALL PRECAUTIONS IN PLACE.  VS STABLE  NO OTHER C/O AT THIS TIME  CALL BELL AND BELONGINGS WITHIN REACH, REMINDED TO CALL FOR ASSISTANCE.

## 2017-03-04 NOTE — CONSULTS
Pharmacy Vancomycin Consult    Vancomycin trough 3/3 at 1827 = 9.1  Continue current dose of 1,000 mg every 8 hours  Trough due 3/4 at 1800  WBC 12.04, Tmax 98.9, Scr 1.1 (CrCl 144.6 ml/min)  No other antibiotics  G - tommie (respiratory)  Dx: Bacteremia   Goal trough 15-20    Pharmacy will continue to closely monitor patient and make adjustments as necessary.   Thank you for allowing us to participate in this patient's care,   Lizette John 3/3/2017 7:39 PM

## 2017-03-04 NOTE — PROGRESS NOTES
PROGRESS NOTE  HOSPITAL MEDICINE    Admit Date: 2/22/2017    Follow-up For:  Positive blood culture     Subjective:        Interval History:    Patient is a 35 yo male with a hx of gout presented to [facility] ER on 2/21/17 with a right axillary abscess which was I&D'd in the ER. sent home on Bactrim.   He returned to ER in respiratory distress and subsequently intubated.   Patient was successfully extubated 03/01/17       Review of Systems -shortness of breath with improvement     Objective:      Vital Signs Range (Last 24H):  Temp:  [97.6 °F (36.4 °C)-98.8 °F (37.1 °C)]   Pulse:  [75-94]   Resp:  [18-20]   BP: (107-131)/(57-96)   SpO2:  [94 %-98 %]     I & O (Last 24H):    Intake/Output Summary (Last 24 hours) at 03/04/17 1215  Last data filed at 03/04/17 0309   Gross per 24 hour   Intake              500 ml   Output              115 ml   Net              385 ml       Physical Exam   Constitutional: appears well nourished  Head: Normocephalic and atraumatic.   Neck: Neck supple. No mass  Cardiovascular: Normal rate and regular rhythm.    Pulmonary/Chest: Good air entry bilaterally, decrease at the bases  Abdomen: Soft. Non-tender and non-distended. Bowel sounds are normal.   Neurological: Moves all extremities.  Skin: Warm and dry.  diffuse rash     Extremities: No clubbing cyanosis or edema.    Medications:  Current Facility-Administered Medications   Medication    acetaminophen oral solution 650 mg    albuterol-ipratropium 2.5mg-0.5mg/3mL nebulizer solution 3 mL    amlodipine tablet 5 mg    bisacodyl suppository 10 mg    dextrose 50% injection 12.5 g    enoxaparin injection 40 mg    famotidine tablet 20 mg    furosemide injection 40 mg    glucagon (human recombinant) injection 1 mg    hydrALAZINE injection 20 mg    influenza vaccine 60 mcg (15 mcg x 4)/0.5 mL (for patients > 36 months) injection    insulin aspart pen 1-10 Units    insulin detemir pen 10 Units    lorazepam injection 1 mg     metoprolol tartrate (LOPRESSOR) tablet 25 mg    ondansetron injection 4 mg    pneumoc 13-hamida conj-dip cr(PF) 0.5 mL 0.5 mL    polyethylene glycol packet 17 g    vancomycin 1 g in dextrose 5 % 250 mL IVPB (ready to mix system)       Laboratory Data:  Reviewed and noted in plan where applicable. Please see chart for full laboratory data.    Lab Results   Component Value Date    WBC 11.92 03/04/2017    HGB 15.4 03/04/2017    HCT 47.3 03/04/2017    MCV 94 03/04/2017     03/04/2017         Recent Labs  Lab 03/04/17  0521   *      K 3.2*   CL 99   CO2 30*   BUN 29*   CREATININE 0.8   CALCIUM 9.2       Microbiology Results (last 7 days)     Procedure Component Value Units Date/Time    Blood culture [[id number]] Collected:  03/01/17 0923    Order Status:  Completed Specimen:  Blood Updated:  03/04/17 0757     Blood Culture, Routine Gram stain aer bottle: Gram positive cocci in clusters resembling Staph     Blood Culture, Routine Results called to and read back by:Madi Chakraborty RN 03/02/2017  16:30     Blood Culture, Routine --     COAGULASE-NEGATIVE STAPHYLOCOCCUS SPECIES  Organism is a probable contaminant      Blood culture [[id number]] Collected:  03/02/17 1841    Order Status:  Sent Specimen:  Blood Updated:  03/03/17 0328    Blood Culture #1 **CANNOT BE ORDERED STAT** [[id number]] Collected:  02/22/17 1030    Order Status:  Completed Specimen:  Blood from Peripheral, Hand, Right Updated:  02/27/17 2212     Blood Culture, Routine No growth after 5 days.    Blood Culture #2 **CANNOT BE ORDERED STAT** [[id number]] Collected:  02/22/17 1045    Order Status:  Completed Specimen:  Blood from Peripheral, Hand, Right Updated:  02/27/17 2212     Blood Culture, Routine No growth after 5 days.    Culture, Respiratory with Gram Stain [[id number]] Collected:  02/23/17 1647    Order Status:  Completed Specimen:  Respiratory from Sputum, Induced Updated:  02/27/17 1140     Respiratory Culture Normal respiratory  roxana     Gram Stain (Respiratory) >10 epithelial cells per low power field     Gram Stain (Respiratory) Many WBC's     Gram Stain (Respiratory) Many Gram negative rods     Gram Stain (Respiratory) Few Gram positive cocci     Gram Stain (Respiratory) Rare Gram negative diplococci    Narrative:       Endotracheal tube suction          Radiology:  Reviewed and noted in plan where applicable.  Please see chart for full radiology data.    ASSESSMENT/PLAN:     Active Hospital Problems    Diagnosis  POA    *Positive blood culture [R78.81]  No    Allergic reaction [T78.40XA]  Yes    Anaphylaxis [T78.2XXA]  Yes      Resolved Hospital Problems    Diagnosis Date Resolved POA    Acute respiratory failure with hypoxia [J96.01] 03/02/2017 Yes    Pneumonia due to Gram-negative bacteria [J15.6] 03/03/2017 Yes    Hyperglycemia, drug-induced [R73.9] 03/02/2017 No    Allergic reaction [T78.40XA] 03/01/2017 Yes    Abscess of axilla, right [L02.411] 03/01/2017 Yes     VDRF due to Pneumonia      succesful extubation   CT chest negative for embolism but segmental consolidation bilaterally in the      bases worse on the right.    follow up CXR today shows improvement   evaluate for home oxygen     Uncontrolled hypertension     improved    Norvasc and lopressor       monitor       Allergic reaction  Associated with Bactrim initiation     Abscess of axilla, right  Initially prescribed Bactrim   Healing well     Hypokalemia    replace     Diffuse skin rash    Possibly due to drug reaction        Pneumonia due to Gram-negative bacteremia    CTA suggest lower lobe consolidation/atelectasis   continue antibiotic coverage     follow blood cultures - DC antibiotics if cultures -neg     Hyperglycemia  Accuchecks and SSI/levemir        Disposition -DC planning to SNF

## 2017-03-04 NOTE — PT/OT/SLP PROGRESS
Physical Therapy  Treatment    Matty Nunn   MRN: [id number]   Admitting Diagnosis: Positive blood culture    PT Received On: 17  PT Start Time: 1017     PT Stop Time: 1041    PT Total Time (min): 24 min       Billable Minutes:  Gait Training8 and Therapeutic Exercise 16    Treatment Type: Treatment  PT/PTA: PTA     PTA Visit Number: 1       General Precautions: Standard, fall  Orthopedic Precautions: N/A   Braces:           Subjective:  Communicated with nurse and epic prior to session.      Pain Ratin/10              Pain Rating Post-Intervention: 0/10    Objective:        Functional Mobility:  Bed Mobility:        Transfers:  Sit <> Stand Assistance: Maximum Assistance  Sit <> Stand Assistive Device: Rolling Walker    Gait:   Gait Distance: 26  Assistance 1: Moderate assistance (x2)  Gait Assistive Device: Rolling walker  Gait Deviation(s): decreased thomas, decreased step length, decreased stride length    Stairs:      Balance:   Static Sit: FAIR: Maintains without assist, but unable to take any challenges   Dynamic Sit: FAIR: Cannot move trunk without losing balance  Static Stand: POOR: Needs MODERATE assist to maintain  Dynamic stand: POOR: N/A     Therapeutic Activities and Exercises:  Pt worked on standing tolerance and performed seated exercises.      AM-PAC 6 CLICK MOBILITY  How much help from another person does this patient currently need?   1 = Unable, Total/Dependent Assistance  2 = A lot, Maximum/Moderate Assistance  3 = A little, Minimum/Contact Guard/Supervision  4 = None, Modified [place]/Independent         AM-PAC Raw Score CMS G-Code Modifier Level of Impairment Assistance   6 % Total / Unable   7 - 9 CM 80 - 100% Maximal Assist   10 - 14 CL 60 - 80% Moderate Assist   15 - 19 CK 40 - 60% Moderate Assist   20 - 22 CJ 20 - 40% Minimal Assist   23 CI 1-20% SBA / CGA   24 CH 0% Independent/ Mod I     Patient left up in chair with all lines intact.     .    Rehab  identified problem list/impairments: Rehab identified problem list/impairments: weakness, impaired endurance    Rehab potential is good.    Activity tolerance: Good    Discharge recommendations: Discharge Facility/Level Of Care Needs: rehabilitation facility     Barriers to discharge: Barriers to Discharge: Decreased caregiver support    Equipment recommendations: Equipment Needed After Discharge: walker, rolling     GOALS:   Physical Therapy Goals        Problem: Physical Therapy Goal    Goal Priority Disciplines Outcome Goal Variances Interventions   Physical Therapy Goal     PT/OT, PT Ongoing (interventions implemented as appropriate)     Description:  LTG'S TO BE MET IN 7 DAYS (3-10-17)  1. PT WILL REQUIRE MODA FOR BED MOBILITY  2. PT WILL REQUIRE MODA FOR TF'S  3. PT WILL AMB 50' WITH RW AND MODA  4. PT WILL TOLERATE BLE THEREX X 20 REPS AAROM/AROM  5. PT WILL TOLERATE P+ DYNAMIC SITTING BALANCE               PLAN:    Patient to be seen 5 x/week  to address the above listed problems via therapeutic exercises, therapeutic activities, gait training  Plan of Care expires: 03/10/17  Plan of Care reviewed with: patient         Santiago Knowles, PT  03/04/2017

## 2017-03-04 NOTE — NURSING
Red spots to bilat legs, arms, buttocks. Blanchable redness to sacrum. cavilon applied. Wound care consult placed.     Mcgill catheter removed @ 1500 by ICU nurse. Pt due to void at 2100      Mitch Peguero RN

## 2017-03-04 NOTE — PROGRESS NOTES
"Called to patient's room per patient. States "this IV is really burning, and my hand does too". Currently IV antibiotic, Vancomycin infusing to IV site. Stopped antibiotic. Unable to get blood return from saline lock. Dr. Hargrove  Notified.   "

## 2017-03-04 NOTE — PROGRESS NOTES
Progress Note  Pulmonology    Admit Date: 2/22/2017   LOS: 10 days     SUBJECTIVE:     Follow-up For:    Post MICU discharge  Participates in PT however very weak      Continuous Infusions:   Scheduled Meds:   amlodipine  5 mg Oral Daily    enoxaparin  40 mg Subcutaneous Daily    famotidine  20 mg Oral BID    furosemide  40 mg Intravenous Daily    insulin detemir  10 Units Subcutaneous Daily    metoprolol tartrate  25 mg Oral BID    polyethylene glycol  17 g Per NG tube Daily    vancomycin (VANCOCIN) IVPB  1,000 mg Intravenous Q8H       Review of Systems:  Constitutional: no fever or chills  Respiratory: no cough or shortness of breath  Cardiovascular: no chest pain or palpitations    OBJECTIVE:     Vital Signs Range (Last 24H):  Temp:  [97.6 °F (36.4 °C)-98.8 °F (37.1 °C)]   Pulse:  [75-94]   Resp:  [18-20]   BP: (107-131)/(57-96)   SpO2:  [94 %-98 %]     I & O (Last 24H):    Intake/Output Summary (Last 24 hours) at 03/04/17 1053  Last data filed at 03/04/17 0309   Gross per 24 hour   Intake              740 ml   Output              515 ml   Net              225 ml     Physical Exam:  General: no distress, well developed, well nourished, moderately obese  Neck: no jugular venous distention  Lungs:  clear to auscultation bilaterally, normal respiratory effort and normal percussion bilaterally  Chest Wall: no tenderness  Heart: regular rate and rhythm and no murmur  Abdomen: soft, non-tender non-distended; bowel sounds normal  Extremities: no cyanosis or edema, or clubbing  Neurologic: alert, oriented, thought content appropriate    Laboratory:  CBC:     Recent Labs  Lab 03/04/17  0521   WBC 11.92   RBC 5.04   HGB 15.4   HCT 47.3      MCV 94   MCH 30.6   MCHC 32.6     Microbiology Results (last 7 days)     Procedure Component Value Units Date/Time    Blood culture [[id number]] Collected:  03/01/17 0923    Order Status:  Completed Specimen:  Blood Updated:  03/04/17 5829     Blood Culture, Routine Gram  stain aer bottle: Gram positive cocci in clusters resembling Staph     Blood Culture, Routine Results called to and read back by:Madi Chakraborty RN 03/02/2017  16:30     Blood Culture, Routine --     COAGULASE-NEGATIVE STAPHYLOCOCCUS SPECIES  Organism is a probable contaminant      Blood culture [[id number]] Collected:  03/02/17 1841    Order Status:  Sent Specimen:  Blood Updated:  03/03/17 0328    Blood Culture #1 **CANNOT BE ORDERED STAT** [[id number]] Collected:  02/22/17 1030    Order Status:  Completed Specimen:  Blood from Peripheral, Hand, Right Updated:  02/27/17 2212     Blood Culture, Routine No growth after 5 days.    Blood Culture #2 **CANNOT BE ORDERED STAT** [[id number]] Collected:  02/22/17 1045    Order Status:  Completed Specimen:  Blood from Peripheral, Hand, Right Updated:  02/27/17 2212     Blood Culture, Routine No growth after 5 days.    Culture, Respiratory with Gram Stain [[id number]] Collected:  02/23/17 1647    Order Status:  Completed Specimen:  Respiratory from Sputum, Induced Updated:  02/27/17 1140     Respiratory Culture Normal respiratory roxana     Gram Stain (Respiratory) >10 epithelial cells per low power field     Gram Stain (Respiratory) Many WBC's     Gram Stain (Respiratory) Many Gram negative rods     Gram Stain (Respiratory) Few Gram positive cocci     Gram Stain (Respiratory) Rare Gram negative diplococci    Narrative:       Endotracheal tube suction          ASSESSMENT/PLAN:     Active Hospital Problems    Diagnosis  POA    *Positive blood culture [R78.81]  No    Allergic reaction [T78.40XA]  Yes    Anaphylaxis [T78.2XXA]  Yes      Resolved Hospital Problems    Diagnosis Date Resolved POA    Acute respiratory failure with hypoxia [J96.01] 03/02/2017 Yes    Pneumonia due to Gram-negative bacteria [J15.6] 03/03/2017 Yes    Hyperglycemia, drug-induced [R73.9] 03/02/2017 No    Allergic reaction [T78.40XA] 03/01/2017 Yes    Abscess of axilla, right [L02.411] 03/01/2017 Yes        Plan:  PT and OT  Afebrile normal Wbcc, if second set of blood culture -ve DC abx  Home O2 eval    Sign off    Thank you for the courtesy of participating in the care of this patient  DW Dr Delvin Ortega MD

## 2017-03-04 NOTE — PT/OT/SLP PROGRESS
Occupational Therapy      Matty Nunn  MRN: [id number]    Patient not seen today secondary to pt with c/o iv pain in arm. Pt requested to continue therapy session on later date. Nurse Graciela notified.    Coby Simpson, OT   3058  3/4/2017

## 2017-03-05 LAB
POCT GLUCOSE: 108 MG/DL (ref 70–110)
POCT GLUCOSE: 114 MG/DL (ref 70–110)
POCT GLUCOSE: 83 MG/DL (ref 70–110)
POCT GLUCOSE: 98 MG/DL (ref 70–110)

## 2017-03-05 PROCEDURE — 25000003 PHARM REV CODE 250: Performed by: INTERNAL MEDICINE

## 2017-03-05 PROCEDURE — 25000003 PHARM REV CODE 250: Performed by: NURSE PRACTITIONER

## 2017-03-05 PROCEDURE — 97110 THERAPEUTIC EXERCISES: CPT

## 2017-03-05 PROCEDURE — 63600175 PHARM REV CODE 636 W HCPCS: Performed by: INTERNAL MEDICINE

## 2017-03-05 PROCEDURE — 21400001 HC TELEMETRY ROOM

## 2017-03-05 PROCEDURE — 97116 GAIT TRAINING THERAPY: CPT

## 2017-03-05 RX ADMIN — ENOXAPARIN SODIUM 40 MG: 100 INJECTION SUBCUTANEOUS at 12:03

## 2017-03-05 RX ADMIN — METOPROLOL TARTRATE 25 MG: 25 TABLET ORAL at 09:03

## 2017-03-05 RX ADMIN — AMLODIPINE BESYLATE 5 MG: 5 TABLET ORAL at 09:03

## 2017-03-05 RX ADMIN — FAMOTIDINE 20 MG: 20 TABLET ORAL at 08:03

## 2017-03-05 RX ADMIN — POLYETHYLENE GLYCOL 3350 17 G: 17 POWDER, FOR SOLUTION ORAL at 09:03

## 2017-03-05 RX ADMIN — METOPROLOL TARTRATE 25 MG: 25 TABLET ORAL at 08:03

## 2017-03-05 RX ADMIN — FAMOTIDINE 20 MG: 20 TABLET ORAL at 09:03

## 2017-03-05 NOTE — PROGRESS NOTES
PROGRESS NOTE  HOSPITAL MEDICINE    Admit Date: 2/22/2017    Follow-up For:  Positive blood culture/Pneumonia     Subjective:        Interval History:    Patient is a 37 yo male with a hx of gout presented to [facility] ER on 2/21/17 with a right axillary abscess which was I&D'd in the ER. sent home on Bactrim.   He returned to ER in respiratory distress and subsequently intubated.   Patient was successfully extubated 03/01/17   Antibiotics discontinued , follow up CXR 03/05/17 -no acute distress, vitals wnl   He complained of lower ext pain - venous doppler ordered .   Podiatry consulted for blister  On sole of  left foot       Review of Systems -shortness of breath with improvement     Objective:      Vital Signs Range (Last 24H):  Temp:  [98 °F (36.7 °C)-98.9 °F (37.2 °C)]   Pulse:  [73-90]   Resp:  [18-20]   BP: (121-147)/(68-91)   SpO2:  [92 %-99 %]     I & O (Last 24H):    Intake/Output Summary (Last 24 hours) at 03/05/17 1013  Last data filed at 03/04/17 1700   Gross per 24 hour   Intake              490 ml   Output              300 ml   Net              190 ml       Physical Exam   Constitutional: appears well nourished  Head: Normocephalic and atraumatic.   Neck: Neck supple. No mass  Cardiovascular: Normal rate and regular rhythm.    Pulmonary/Chest: Good air entry bilaterally, decrease at the bases  Abdomen: Soft. Non-tender and non-distended. Bowel sounds are normal.   Neurological: Moves all extremities.  Skin: Warm and dry.  diffuse rash     Extremities: No clubbing cyanosis or edema.    Medications:  Current Facility-Administered Medications   Medication    acetaminophen oral solution 650 mg    albuterol-ipratropium 2.5mg-0.5mg/3mL nebulizer solution 3 mL    amlodipine tablet 5 mg    bisacodyl suppository 10 mg    dextrose 50% injection 12.5 g    enoxaparin injection 40 mg    famotidine tablet 20 mg    glucagon (human recombinant) injection 1 mg    hydrALAZINE injection 20 mg    influenza  vaccine 60 mcg (15 mcg x 4)/0.5 mL (for patients > 36 months) injection    insulin aspart pen 1-10 Units    insulin detemir pen 10 Units    lorazepam injection 1 mg    metoprolol tartrate (LOPRESSOR) tablet 25 mg    ondansetron injection 4 mg    pneumoc 13-hamida conj-dip cr(PF) 0.5 mL 0.5 mL    polyethylene glycol packet 17 g       Laboratory Data:  Reviewed and noted in plan where applicable. Please see chart for full laboratory data.    Lab Results   Component Value Date    WBC 11.92 03/04/2017    HGB 15.4 03/04/2017    HCT 47.3 03/04/2017    MCV 94 03/04/2017     03/04/2017       No results for input(s): GLU, NA, K, CL, CO2, BUN, CREATININE, CALCIUM, MG in the last 24 hours.    Microbiology Results (last 7 days)     Procedure Component Value Units Date/Time    Blood culture [[id number]] Collected:  03/02/17 1841    Order Status:  Completed Specimen:  Blood Updated:  03/05/17 0613     Blood Culture, Routine No growth to date     Blood Culture, Routine No Growth to date    Blood culture [[id number]] Collected:  03/01/17 0923    Order Status:  Completed Specimen:  Blood Updated:  03/04/17 0757     Blood Culture, Routine Gram stain aer bottle: Gram positive cocci in clusters resembling Staph     Blood Culture, Routine Results called to and read back by:Madi Chakraborty RN 03/02/2017  16:30     Blood Culture, Routine --     COAGULASE-NEGATIVE STAPHYLOCOCCUS SPECIES  Organism is a probable contaminant      Blood Culture #1 **CANNOT BE ORDERED STAT** [[id number]] Collected:  02/22/17 1030    Order Status:  Completed Specimen:  Blood from Peripheral, Hand, Right Updated:  02/27/17 2212     Blood Culture, Routine No growth after 5 days.    Blood Culture #2 **CANNOT BE ORDERED STAT** [[id number]] Collected:  02/22/17 1045    Order Status:  Completed Specimen:  Blood from Peripheral, Hand, Right Updated:  02/27/17 2212     Blood Culture, Routine No growth after 5 days.    Culture, Respiratory with Gram Stain [[id number]]  Collected:  02/23/17 1647    Order Status:  Completed Specimen:  Respiratory from Sputum, Induced Updated:  02/27/17 1140     Respiratory Culture Normal respiratory roxana     Gram Stain (Respiratory) >10 epithelial cells per low power field     Gram Stain (Respiratory) Many WBC's     Gram Stain (Respiratory) Many Gram negative rods     Gram Stain (Respiratory) Few Gram positive cocci     Gram Stain (Respiratory) Rare Gram negative diplococci    Narrative:       Endotracheal tube suction          Radiology:  Reviewed and noted in plan where applicable.  Please see chart for full radiology data.    ASSESSMENT/PLAN:     Active Hospital Problems    Diagnosis  POA    *Positive blood culture [R78.81]  No    Allergic reaction [T78.40XA]  Yes    Anaphylaxis [T78.2XXA]  Yes      Resolved Hospital Problems    Diagnosis Date Resolved POA    Acute respiratory failure with hypoxia [J96.01] 03/02/2017 Yes    Pneumonia due to Gram-negative bacteria [J15.6] 03/03/2017 Yes    Hyperglycemia, drug-induced [R73.9] 03/02/2017 No    Allergic reaction [T78.40XA] 03/01/2017 Yes    Abscess of axilla, right [L02.411] 03/01/2017 Yes     VDRF due to Pneumonia      succesful extubation   CT chest negative for embolism but segmental consolidation bilaterally in the      bases worse on the right.    follow up CXR today shows improvement   evaluate for home oxygen      Hypertension     improved     Norvasc and lopressor       monitor       Allergic reaction  Associated with Bactrim initiation     Abscess of axilla, right  Initially prescribed Bactrim   Healing well     Hypokalemia    replace     Diffuse skin rash    Improving     Possibly due to drug reaction        Pneumonia due to Gram-negative bacteremia    CTA suggest lower lobe consolidation/atelectasis   continue antibiotic coverage     follow blood cultures - DC antibiotics if cultures -neg     Hyperglycemia  Accuchecks and SSI   check HgA1c        Disposition -DC planning to  SNF

## 2017-03-05 NOTE — PLAN OF CARE
Problem: Physical Therapy Goal  Goal: Physical Therapy Goal  LTGS TO BE MET IN 7 DAYS (3-10-17)  1. PT WILL REQUIRE MODA FOR BED MOBILITY  2. PT WILL REQUIRE MODA FOR TFS  3. PT WILL AMB 50 WITH RW AND MODA  4. PT WILL TOLERATE BLE THEREX X 20 REPS AAROM/AROM  5. PT WILL TOLERATE P+ DYNAMIC SITTING BALANCE   Outcome: Ongoing (interventions implemented as appropriate)  PTA instructed pt on UE exercise with LUE guiding RUE 2* RUE weakness. Pt increased ambulation distance this date. Pt able to tolerate increased exercises this date as well. Pt left in bedside chair with all needs with in reach and wife present.

## 2017-03-05 NOTE — PT/OT/SLP PROGRESS
Physical Therapy  Treatment    Matty Nunn   MRN: [id number]   Admitting Diagnosis: Positive blood culture    PT Received On: 17  PT Start Time: 825     PT Stop Time: 49    PT Total Time (min): 24 min       Billable Minutes:  Gait [id number] and Therapeutic Exercise 10    Treatment Type: Treatment  PT/PTA: PTA     PTA Visit Number: 2       General Precautions: Standard, fall  Orthopedic Precautions: N/A   Braces:           Subjective:  Communicated with nurse and epic prior to session.      Pain Ratin/10              Pain Rating Post-Intervention: 0/10    Objective:   Patient found with: telemetry    Functional Mobility:  Bed Mobility:        Transfers:  Sit <> Stand Assistance: Minimum Assistance  Sit <> Stand Assistive Device: Rolling Walker    Gait:   Gait Distance: 120 feet  Assistance 1: Contact Guard Assistance, Minimum assistance  Gait Assistive Device: Rolling walker  Gait Deviation(s): decreased thomas, decreased step length, decreased stride length    Stairs:      Balance:   Static Sit: GOOD-: Takes MODERATE challenges from all directions but inconsistently  Dynamic Sit: GOOD-: Maintains balance through MODERATE excursions of active trunk movement,     Static Stand: FAIR: Maintains without assist but unable to take challenges  Dynamic stand: POOR: N/A     Therapeutic Activities and Exercises:  Pt performed seated exercises f/b ambulation lee with Min A x2 with RW.      AM-PAC 6 CLICK MOBILITY  How much help from another person does this patient currently need?   1 = Unable, Total/Dependent Assistance  2 = A lot, Maximum/Moderate Assistance  3 = A little, Minimum/Contact Guard/Supervision  4 = None, Modified [place]/Independent         AM-PAC Raw Score CMS G-Code Modifier Level of Impairment Assistance   6 % Total / Unable   7 - 9 CM 80 - 100% Maximal Assist   10 - 14 CL 60 - 80% Moderate Assist   15 - 19 CK 40 - 60% Moderate Assist   20 - 22 CJ 20 - 40% Minimal Assist    23 CI 1-20% SBA / CGA   24 CH 0% Independent/ Mod I     Patient left up in chair with all lines intact and wife present.        Rehab identified problem list/impairments: Rehab identified problem list/impairments: weakness, impaired endurance    Rehab potential is good.    Activity tolerance: Good    Discharge recommendations: Discharge Facility/Level Of Care Needs: rehabilitation facility     Barriers to discharge: Barriers to Discharge: Decreased caregiver support    Equipment recommendations: Equipment Needed After Discharge: walker, rolling     GOALS:   Physical Therapy Goals        Problem: Physical Therapy Goal    Goal Priority Disciplines Outcome Goal Variances Interventions   Physical Therapy Goal     PT/OT, PT Ongoing (interventions implemented as appropriate)     Description:  LTG'S TO BE MET IN 7 DAYS (3-10-17)  1. PT WILL REQUIRE MODA FOR BED MOBILITY  2. PT WILL REQUIRE MODA FOR TF'S  3. PT WILL AMB 50' WITH RW AND MODA  4. PT WILL TOLERATE BLE THEREX X 20 REPS AAROM/AROM  5. PT WILL TOLERATE P+ DYNAMIC SITTING BALANCE               PLAN:    Patient to be seen 5 x/week  to address the above listed problems via therapeutic exercises, therapeutic activities, gait training  Plan of Care expires: 03/10/17  Plan of Care reviewed with: patient, spouse         Santiago Knowles, PT  03/05/2017

## 2017-03-05 NOTE — PROGRESS NOTES
Attempted x's three per ANGELICA. Jatinder RAJANN to restart IV without success. Pt requests that no further attempt to start IV access. Dr. Hargrove notified. Await  New orders.

## 2017-03-05 NOTE — PLAN OF CARE
Problem: Patient Care Overview  Goal: Plan of Care Review  Outcome: Ongoing (interventions implemented as appropriate)  Plan of care reviewed with patient. AAO x3. V/S stable. Patient complaining of 0/10 pain at this time. Patient on telemetry SR rhythm noted. Patient ambulating with assistance, fall precautions in place, patient free from fall/injury. Patient has no questions at this time. Will continue to monitor.

## 2017-03-06 PROBLEM — R29.898 RIGHT ARM WEAKNESS: Status: ACTIVE | Noted: 2017-03-06

## 2017-03-06 LAB
POCT GLUCOSE: 118 MG/DL (ref 70–110)
POCT GLUCOSE: 92 MG/DL (ref 70–110)

## 2017-03-06 PROCEDURE — 97535 SELF CARE MNGMENT TRAINING: CPT

## 2017-03-06 PROCEDURE — 97530 THERAPEUTIC ACTIVITIES: CPT

## 2017-03-06 PROCEDURE — 97110 THERAPEUTIC EXERCISES: CPT

## 2017-03-06 PROCEDURE — 94761 N-INVAS EAR/PLS OXIMETRY MLT: CPT

## 2017-03-06 PROCEDURE — 99252 IP/OBS CONSLTJ NEW/EST SF 35: CPT | Mod: ,,, | Performed by: PODIATRIST

## 2017-03-06 PROCEDURE — 94640 AIRWAY INHALATION TREATMENT: CPT

## 2017-03-06 PROCEDURE — 25000003 PHARM REV CODE 250: Performed by: INTERNAL MEDICINE

## 2017-03-06 PROCEDURE — 25000242 PHARM REV CODE 250 ALT 637 W/ HCPCS: Performed by: NURSE PRACTITIONER

## 2017-03-06 PROCEDURE — 63600175 PHARM REV CODE 636 W HCPCS: Performed by: INTERNAL MEDICINE

## 2017-03-06 PROCEDURE — 21400001 HC TELEMETRY ROOM

## 2017-03-06 PROCEDURE — 97803 MED NUTRITION INDIV SUBSEQ: CPT

## 2017-03-06 PROCEDURE — 25000003 PHARM REV CODE 250: Performed by: NURSE PRACTITIONER

## 2017-03-06 PROCEDURE — 97116 GAIT TRAINING THERAPY: CPT

## 2017-03-06 RX ADMIN — AMLODIPINE BESYLATE 5 MG: 5 TABLET ORAL at 08:03

## 2017-03-06 RX ADMIN — ACETAMINOPHEN 650 MG: 160 SOLUTION ORAL at 09:03

## 2017-03-06 RX ADMIN — FAMOTIDINE 20 MG: 20 TABLET ORAL at 09:03

## 2017-03-06 RX ADMIN — METOPROLOL TARTRATE 25 MG: 25 TABLET ORAL at 09:03

## 2017-03-06 RX ADMIN — FAMOTIDINE 20 MG: 20 TABLET ORAL at 08:03

## 2017-03-06 RX ADMIN — IPRATROPIUM BROMIDE AND ALBUTEROL SULFATE 3 ML: .5; 3 SOLUTION RESPIRATORY (INHALATION) at 10:03

## 2017-03-06 RX ADMIN — METOPROLOL TARTRATE 25 MG: 25 TABLET ORAL at 08:03

## 2017-03-06 RX ADMIN — ENOXAPARIN SODIUM 40 MG: 100 INJECTION SUBCUTANEOUS at 12:03

## 2017-03-06 NOTE — PROGRESS NOTES
Dr. Hargrove notified of unsuccessful attempts to draw blood for ordered labwork today after multiple attempts.  [facility] will attempt again 3/6/17.

## 2017-03-06 NOTE — PT/OT/SLP PROGRESS
Occupational Therapy      Matty Nunn  MRN: [id number]   S: PT COOPERATIVE WITH THERAPY SESSION. PT WITH NO C/O PAIN  O: PT PERFORMED 1 SET X 15 REPS B UE AROM EXERCISE IN ALL AVAILABLE RANGES AND PLANES SEATED IN BED SIDE CHAIR (SHOULDER FLEX/ ABD; ELBOW FLEX/ EXT)  TO CONTINUES TO DEMONSTRATES IMPROVEMENTS WITH B UE STRENGTH/ENDURANCE AS EVIDENCE BY COMPLETING HEP  P: CONTINUE WITH POC    Coby Simpson, OT   [id number]  1 HANH  3/6/2017

## 2017-03-06 NOTE — PLAN OF CARE
Spoke with hospitalist who indicates that patient is medically stable for discharge today.  Spoke with PT/OT who are recommending inpatient rehab for patient post-hospitalization.  Met with patient and patient's wife, Kasandra Conn ([phone]) in hospital room, with patient refusing inpatient rehab level of care, stating that he instead plans to return home to [place], [place] and receive outpatient PT/OT.  Wife further indicates that patient will need an outpatient day neuro program for PT/OT.  Wife contacted [facility] in [place] that states they have outpatient PT/OT, but not a day neuro program.  Wife requesting that this  locate an outpatient day neuro program in or near [place], [place].  Contacted [facility] and spoke with  who states that they refer patients to [facility] in [place] for outpatient PT/OT and [facility] in [place], [place].  Obtained phone numbers for both of these facilities and provided to patient's wife.  Wife stating that [facility] is not a facility to where she wants  referred and [place] in too far away.  While in room and with further discussion, patient indicates that he wants to be discharged to inpatient rehab at [facility] in [place], [place].  Patient Preference Form signed reflecting this inpatient rehab preference.      Contacted Marissa with [facility] in [place] ([phone]) who states that they have an available inpatient rehab bed.  Faxed Marissa with [facility] ([phone]) needed patient information to complete inpatient rehab referral.  Confirmation obtained that fax was received by [facility] in [place].  Marissa states that she will submit to patient's insurance for authorization and notify this  when authorization is received.  Hospitalist, patient and wife notified of this.      1600:  Marissa with [facility] in [place]  requesting that Podiatry and Wound Care Notes/Consult Notes be faxed to her once entered in Epic.    D/C PLAN:  Pending insurance authorization, inpatient rehab for PT/OT at [facility] [place] operated by [facility] in [place], [place]       03/06/17 1313   Discharge Reassessment   Assessment Type Discharge Planning Reassessment   Can the patient answer the patient profile reliably? Yes, cognitively intact   How does the patient rate their overall health at the present time? Good   Describe the patient's ability to walk at the present time. Minor restrictions or changes   How often would a person be available to care for the patient? Whenever needed   Number of comorbid conditions (as recorded on the chart) One   During the past month, has the patient often been bothered by feeling down, depressed or hopeless? No   During the past month, has the patient often been bothered by little interest or pleasure in doing things? No   Discharge plan remains the same: No   Provided patient/caregiver education on the expected discharge date and the discharge plan Yes   Discharge Plan A Rehab   Change in patient condition or support system No   Patient choice form signed by patient/caregiver Yes   Explained to the the patient/caregiver why the discharge planned changed: Yes   Involved the patient/caregiver in establishing a new discharge plan: Yes

## 2017-03-06 NOTE — PLAN OF CARE
Problem: Occupational Therapy Goal  Goal: Occupational Therapy Goal  OT GOALS TO BE MET BY 3-9-17  1. MIN A WITH UE DRESSING  2. MIN A WITH LE DRESSING  3. PT WILL TOLERATE 1 SET X 15 REPS B UE ROM EXERCISE SEATED EOB   Outcome: Ongoing (interventions implemented as appropriate)  IMPROVEMENTS WITH FUNCTIONAL MOBILITY AND ADL'S

## 2017-03-06 NOTE — PLAN OF CARE
Problem: Patient Care Overview  Goal: Plan of Care Review  Outcome: Ongoing (interventions implemented as appropriate)  PT WITH IMPROVED FUNCTIONAL MOBILITY BUT STILL HAS SIGNIFICANT WEAKNESS TO RUE, GENERALIZED WEAKNESS SO QUICK TO FATIGUE WITH OOB ACTIVITY, PT REFUSES INPATIENT REHAB, ADAMANT ABOUT GOING HOME

## 2017-03-06 NOTE — PT/OT/SLP PROGRESS
Physical Therapy      Matty Nunn  MRN: [id number]    S: PT AGREEABLE TO BLE THEREX  O: PT FOUND SEATED IN CHAIR UPON ARRIVAL, NO C/O PAIN.  PT EDUCATED IN AND PERFORMED BLE THEREX X 20 REPS WITH REST BREAKS.  PT LEFT SEATED IN CHAIR WITH ALL NEEDS MET.  PT ENCOURAGED TO CALL FOR ASSISTANCE WITH ALL NEEDS DUE TO FALL RISK STATUS, PT AGREEABLE.  PT ENCOURAGED TO INCREASE TIME OOB IN CHAIR, ALL MEALS IN CHAIR OOB, PT AGREEABLE  A: GOOD PARTICIPATION  P: CONT PER POC    Renae Noriega, PT   3/6/2017  [phone]

## 2017-03-06 NOTE — CONSULTS
[facility] -   Adult Nutrition  Consult Note    SUMMARY     Recommendations  Recommendation/Intervention: 1. Continue Regular diet  Goals: Intake of % of meals  Nutrition Goal Status: new  Communication of RD Recs: discussed on rounds    Nutrition Discharge Plan  Home on Regular diet    Reason for Assessment  Reason for Assessment: RD follow-up  Diagnosis:  (Anaphylaxis)  Relevent Medical History: Gout   Interdisciplinary Rounds: attended  General Information Comments: Patient on Regular diet with good intake    Nutrition Prescription Ordered  Current Diet Order: Regular    Nutrition Risk Screen  Nutrition Risk Screen: no indicators present    Nutrition/Diet History  Typical Food/Fluid Intake: Consuming % of meals    Labs/Tests/Procedures/Meds  Pertinent Labs Reviewed: reviewed  Pertinent Labs Comments: K 3.2, BUN 29, Gluc 128, Alb 3.1  Pertinent Medications Reviewed: reviewed    Physical Findings  Oral/Mouth Cavity:  (white swollen tongue)  Skin:  (Marcelo 13, Swelling)    Anthropometrics  Height (inches): 72.99 in  Weight Method: Bed Scale  Weight (kg): (!) 152.9 kg  Ideal Body Weight (IBW), Male: 183.94 lb  % Ideal Body Weight, Male (lb): 183.26 lb  BMI (kg/m2): 44.48  BMI Grade: greater than 40 - morbid obesity    Estimated/Assessed Needs  Weight Used For Calorie Calculations: (!) 153 kg (337 lb 4.9 oz)   Height (cm): 185.4 cm  Energy Need Method: [place]-St Jeor (2519)  Weight Used For Protein Calculations: (!) 153 kg (337 lb 4.9 oz)  0.8 gm Protein (gm): 122.66  Fluid Need Method: RDA Method (1ml/gregg or as needed)    Monitor and Evaluation  Food and Nutrient Intake: food and beverage intake  Food and Nutrient Adminstration: diet order  Anthropometric Measurements: weight  Biochemical Data, Medical Tests and Procedures: electrolyte and renal panel, glucose/endocrine profile    Nutrition Risk  Level of Risk:  (1x weekly)    Nutrition Follow-Up  RD Follow-up?: Yes    Assessment and  Plan  Acute respiratory failure with hypoxia, resolved as of 3/2/2017  Nutrition Problem:   Swallowing difficulty    Etiology/Related to:   Inability to safely consume oral diet    As evidenced by:   Mechanical ventilation and pharmacological sedation    Treatment Strategy:   1. Enteral nutrition support to meet needs until extubated    Nutrition Diagnosis Status:   New

## 2017-03-06 NOTE — PLAN OF CARE
Problem: Patient Care Overview  Goal: Plan of Care Review  Outcome: Ongoing (interventions implemented as appropriate)  Recommendations  Recommendation/Intervention: 1. Continue Regular diet  Goals: Intake of % of meals  Nutrition Goal Status: new  Communication of RD Recs: discussed on rounds

## 2017-03-06 NOTE — PT/OT/SLP PROGRESS
Occupational Therapy  Treatment    Matty Nunn   MRN: [id number]   Admitting Diagnosis: Positive blood culture    OT Date of Treatment: 17   OT Start Time: 1010  OT Stop Time: 1040  OT Total Time (min): 30 min    Billable Minutes:  Self Care/Home Management 15 MINUTES and Therapeutic Activity 15 MINUTES    General Precautions: Standard, fall  Orthopedic Precautions: N/A  Braces:           Subjective:  Communicated with NURSE ESTELITA AND EPIC CHART REVIEW prior to session.      Pain Ratin/10                   Objective:  Patient found with: telemetry     Functional Mobility:  Bed Mobility:  Rolling/Turning to Left: Minimum assistance  Rolling/Turning Right: Minimum assistance  Scooting/Bridging: Minimum Assistance  Supine to Sit: Minimum Assistance    Transfers:   Sit <> Stand Assistance: Maximum Assistance  Sit <> Stand Assistive Device: No Assistive Device  Bed <> Chair Technique: Stand Pivot  Bed <> Chair Transfer Assistance: Minimum Assistance  Bed <> Chair Assistive Device: No Assistive Device    Functional Ambulation: PT AMBULATED  WITH MIN A AND X SEVERAL LOB AND MOD RECOVERY    Activities of Daily Living:     Feeding adaptive equipment: NA  UE Dressing Level of Assistance: Minimum assistance  UE adaptive equipment: NA  LE Dressing Level of Assistance: Moderate assistance  LE adaptive equipment: NA  Grooming Position: EOB                 Bathing adaptive equipment: NA    Balance:   Static Sit: GOOD: Takes MODERATE challenges from all directions  Dynamic Sit: FAIR+: Maintains balance through MINIMAL excursions of active trunk motion  Static Stand: POOR+: Needs MINIMAL assist to maintain  Dynamic stand: POOR: N/A    Therapeutic Activities and Exercises:      AM-PAC 6 CLICK ADL   How much help from another person does this patient currently need?   1 = Unable, Total/Dependent Assistance  2 = A lot, Maximum/Moderate Assistance  3 = A little, Minimum/Contact Guard/Supervision  4 = None, Modified  [place]/Independent          AM-PAC Raw Score CMS G-Code Modifier Level of Impairment Assistance   6 % Total / Unable   7 - 9 CM 80 - 100% Maximal Assist   10 - 14 CL 60 - 80% Moderate Assist   15 - 19 CK 40 - 60% Moderate Assist   20 - 22 CJ 20 - 40% Minimal Assist   23 CI 1-20% SBA / CGA   24 CH 0% Independent/ Mod I     Patient left up in chair with all lines intact, call button in reach, NURSE ESTELITA notified and SPOUSE present    ASSESSMENT:  Matty Nunn is a 36 y.o. male with a medical diagnosis of Positive blood culture and presents with DEBILITY AND GENERALIZED WEAKNESS. PT WILL CONTINUE TO BENEFIT FROM SKILLED O.T.    Rehab identified problem list/impairments: Rehab identified problem list/impairments: weakness, impaired functional mobilty, impaired balance, impaired endurance, impaired self care skills, gait instability, visual deficits, decreased coordination, decreased safety awareness, decreased upper extremity function, impaired coordination, decreased ROM    Rehab potential is fair.    Activity tolerance: Fair    Discharge recommendations: Discharge Facility/Level Of Care Needs: rehabilitation facility, outpatient OT (REHABILITATION , HOWEVER PT DECLINE REHAB SERVICES AND PT REFERRED TO OUT- PT)     Barriers to discharge: Barriers to Discharge: Decreased caregiver support    Equipment recommendations: walker, rolling     GOALS:   Occupational Therapy Goals        Problem: Occupational Therapy Goal    Goal Priority Disciplines Outcome Interventions   Occupational Therapy Goal     OT, PT/OT Ongoing (interventions implemented as appropriate)    Description:  OT GOALS TO BE MET BY 3-9-17  1.  MIN A WITH UE DRESSING  2.  MIN A WITH LE DRESSING  3. PT WILL TOLERATE 1 SET X 15 REPS B UE ROM EXERCISE SEATED EOB              Plan:  Patient to be seen 3 x/week to address the above listed problems via therapeutic exercises, therapeutic activities, self-care/home management  Plan of Care  expires: 03/09/17  Plan of Care reviewed with: patient         Coby Sanchezzier, OT  03/06/2017

## 2017-03-06 NOTE — PT/OT/SLP PROGRESS
Physical Therapy  Treatment    Matty Nunn   MRN: [id number]   Admitting Diagnosis: Positive blood culture    PT Received On: 17  PT Start Time: 0920     PT Stop Time: 1000    PT Total Time (min): 40 min       Billable Minutes:  Gait [id number], Therapeutic Activity 15 and Therapeutic Exercise 10    Treatment Type: Treatment  PT/PTA: PT          General Precautions: Standard, fall  Orthopedic Precautions: N/A   Braces: N/A    Subjective:  Communicated with NURSE ESTELITA prior to session.  Pain Ratin/10    Objective:   Patient found with: telemetry    Functional Mobility:  Bed Mobility:   Rolling/Turning to Left: Minimum assistance  Rolling/Turning Right: Minimum assistance (DIRECTION TO COMPLETE)  Scooting/Bridging: Contact Guard Assistance  Supine to Sit: Minimum Assistance (CUES FOR CORRECT TECHNIQUE, PT QUICK AND IMPULSIVE, ALMOST HIT HEAD ON BED RAIL)  Sit to Supine: Minimum Assistance    Transfers:  Sit <> Stand Assistance: Contact Guard Assistance  Sit <> Stand Assistive Device: No Assistive Device    Gait:   Gait Distance: PT AMB 35' X 4 TRIALS WITHOUT AD, PT QUICK TO FATIGUE, MILDLY UNSTEADY OVER TIME WITH FATIGUE, CUES FOR UPRIGHT POSTURE AND TO CLEAR FEET  Assistance 1: Minimum assistance  Gait Assistive Device: No device  Gait Pattern: swing-through gait  Gait Deviation(s): decreased thomas, decreased step length, decreased toe-to-floor clearance, decreased weight-shifting ability   REST BREAKS REQUIRED BETWEEN TRIALS, NO AD USED SO QUICK TO FATIGUE, PT PRESENTS WITH ONE SMALL LOB DURING TURN IN HALLWAY WHICH REQUIRED MOD/GARCIA TO RECOVER BALANCE    Stairs:  Pt ascended/descend 3 stair(s) with No Assistive Device with bilateral with Contact Guard Assistance.   PT QUICK TO FATIGUE DURING STAIR CLIMBING, PT ENCOURAGED TO TAKE ONE STEP AT A TIME WITH STEP TO PATTERN, CUES TO CLEAR FOOT OF STEPS, B RAILS USED FOR SAFETY    Balance:   Static Sit: FAIR  Dynamic Sit: FAIR  Static Stand:  POOR+  Dynamic stand:  POOR+    Therapeutic Activities and Exercises:  PT EDUCATED IN AND PERFORMED WALL PUSHUPS AND CHAIR PUSH UPS X 15 REPS WITH EMPHASIS TO RUE, REST BREAKS TAKEN AS NEEDED, PT QUICK TO FATIGUE WITH ACTIVITY GEARED TOWARD RUE.  REVIEWED BLE THEREX FOR PT TO PERFORM WHILE SEATED IN CHAIR THROUGHOUT THE DAY, PT ENCOURAGED TO PERFORM CHAIR PUSHUPS AS WELL    Patient left up in chair with all lines intact, call button in reach, NURSE notified and WIFE present.    Assessment:  Matty Nunn is a 36 y.o. male with a medical diagnosis of Positive blood culture   PT WILL BENEFIT FROM CONT. SKILLED P.T. TO ADDRESS IMPAIRMENTS    Rehab identified problem list/impairments: Rehab identified problem list/impairments: weakness, impaired endurance, impaired self care skills, gait instability, impaired functional mobilty, decreased safety awareness, impaired coordination, impaired balance, decreased upper extremity function, decreased lower extremity function, edema    Rehab potential is good.    Activity tolerance: Good    Discharge recommendations: Discharge Facility/Level Of Care Needs: rehabilitation facility (SPOKE TO PT AND WIFE REGARDING BENEFITS OF INPATIENT REHAB, PT WANTS OUTPATIENT P.T. SO HE CAN RETURN HOME)     Barriers to discharge: Barriers to Discharge: None    Equipment recommendations: Equipment Needed After Discharge: walker, rolling, bath bench (BARIATRIC EQUIPMENT)     GOALS:   Physical Therapy Goals        Problem: Physical Therapy Goal    Goal Priority Disciplines Outcome Goal Variances Interventions   Physical Therapy Goal     PT/OT, PT Ongoing (interventions implemented as appropriate)     Description:  LTG'S TO BE MET IN 7 DAYS (3-13-17)  1. PT WILL REQUIRE [facility] FOR BED MOBILITY  2. PT WILL REQUIRE [facility] FOR TF'S  3. PT WILL ' WITH RW AND CGA  4. PT WILL TOLERATE BLE THEREX X 20 REPS AROM  5. PT WILL TOLERATE F DYNAMIC SITTING BALANCE                PLAN:    Patient to be  seen 5 x/week  to address the above listed problems via CONT PER POC WITH UPDATED GOALS   Plan of Care expires: 3/13/17  Plan of Care reviewed with: patient, spouse    PT ENCOURAGED TO CALL FOR ASSISTANCE WITH ALL NEEDS DUE TO FALL RISK STATUS, PT AGREEABLE.  PT ENCOURAGED TO INCREASE TIME OOB IN CHAIR, ALL MEALS IN CHAIR OOB, PT AGREEABLE    Renae Noriega, PT  03/06/2017

## 2017-03-06 NOTE — CONSULTS
Consult Note    Inpatient consult to Podiatry  Consult performed by: VIN ESPINOSA  Consult ordered by: PIO SERRA        SUBJECTIVE:     History of Present Illness:  Patient is a 36 y.o. male presents with blister on bottom of both feet Patient states wife noticed blisters on the feet while he was in ICU. Patient denies any history of feet problems i the past. . Onset of symptoms was gradual starting 4 days ago with stable course since that time. Patient denies any trauma to the foot. Symptoms are aggravated by none. Symptoms improve with none.    Review of patient's allergies indicates:   Allergen Reactions    Bactrim [sulfamethoxazole-trimethoprim] Swelling    Rifamycin analogues      Past Medical History:   Diagnosis Date    Gout      History reviewed. No pertinent surgical history.  Family History   Problem Relation Age of Onset    No Known Problems Mother     Heart disease Father      Social History   Substance Use Topics    Smoking status: Former Smoker     Types: Cigarettes     Quit date: 2/21/2013    Smokeless tobacco: None    Alcohol use No     ROS  OBJECTIVE:     Vital Signs:  Temp:  [99.1 °F (37.3 °C)-100.7 °F (38.2 °C)]   Pulse:  [90-95]   Resp:  [16-18]   BP: (114-130)/(57-65)   SpO2:  [96 %-98 %]     Physical Exam  Laboratory:  CBC:   Recent Labs  Lab 03/04/17  0521   WBC 11.92   RBC 5.04   HGB 15.4   HCT 47.3      MCV 94   MCH 30.6   MCHC 32.6     BMP:   Recent Labs  Lab 03/04/17  0521   *      K 3.2*   CL 99   CO2 30*   BUN 29*   CREATININE 0.8   CALCIUM 9.2     PHYSICAL EXAM: Apperance: Alert and orient in no distress,well developed, and with good attention to grooming and body habits  LOWER EXTREMITY EXAM:  VASCULAR: Dorsalis pedis pulses 2/4 bilateral and Posterior Tibial pulses 2/4 bilateral. Capillary fill time <3 seconds bilateral. No edema observed bilateral. Varicosities absent bilateral. Skin temperature of the lower extremities is warm to warm,  proximal to distal. Hair growth WNL bilateral.  DERMATOLOGICAL: Intact dry hemorrhagic noted to plantar right foot. Intact clear blister noted to plantar left foot.  Webspaces 1-4 clean, dry and without evidence of break in skin integrity bilateral.   NEUROLOGICAL: Light touch, sharp-dull, proprioception all present and equal bilaterally.    MUSCULOSKELETAL: Muscle strength is 5/5 for foot inverters, everters, plantarflexors, and dorsiflexors. Muscle tone is normal.       Diagnostic Results:  Imaging Results         US Lower Extremity Veins Bilateral (Final result) Result time:  03/05/17 11:31:12    Final result by Kory Stahl MD (03/05/17 11:31:12)    Impression:         Negative for DVT of the lower extremities, bilaterally.      Electronically signed by: KORY STAHL MD  Date:     03/05/17  Time:    11:31     Narrative:    Exam: US LOWER EXTREMITY VEINS BILATERAL,    Date:  03/05/17 11:22:37    History: Bilateral lower extremity swelling.    Comparison:  No prior  studies available for comparison.    Findings:     The deep veins of the lower extremities demonstrate normal compressibility of the greater saphenous, common femoral vein, superficial femoral vein and popliteal vein bilaterally.  The deep profunda also patent bilaterally.            X-Ray Chest 1 View (Final result) Result time:  03/05/17 07:53:21    Final result by Santiago Thomas III, MD (03/05/17 07:53:21)    Impression:     Negative AP chest      Electronically signed by: SANTIAGO THOMAS MD  Date:     03/05/17  Time:    07:53     Narrative:    Chest x-ray, single view.    Clinical indication: Respiratory failure.    Compare to prior study. Normal heart size. Lungs are now grossly clear. No significant atelectatic change.            X-Ray Chest 1 View (Final result) Result time:  03/02/17 08:18:44    Final result by Santiago Thomas III, MD (03/02/17 08:18:44)    Impression:        1. Interval removal of the ET tube. NG  tube again noted.    2. Increased right perihilar atelectasis. Followup recommended      Electronically signed by: SANTIAGO THOMAS MD  Date:     03/02/17  Time:    08:18     Narrative:    One view chest x-ray.    Clinical indication: Respiratory failure    Heart size is unchanged. Interval removal of the ET tube. Increased atelectatic change suggested in the right perihilar zones lungs otherwise unchanged.            X-Ray Abdomen Flat And Erect (Final result) Result time:  03/01/17 17:54:42    Final result by Magnus Arceo MD (03/01/17 17:54:42)    Impression:           1. Negative for acute process involving the abdomen or pelvis.  2.  Stable and incidental findings as noted above.      Electronically signed by: MAGNUS ARCEO MD  Date:     03/01/17  Time:    17:54     Narrative:    Abdomen flat and erect, 2 views:    Clinical Indication: Nausea.  Vomiting.  Constipation.  Generalized abdominal pain    Findings:    Comparison to made to a lumbar spine x-ray from 05/01/2016.  EKG leads overlie the abdomen.   The abdominal gas pattern is normal. No sign of bowel dilation, air/fluid levels or free air.       Phleboliths versus ureteroliths overlie the pelvis.  Stable mild degenerative changes of the spine and pelvis.    The visualized portions of the chest are unchanged from a chest x-ray performed earlier the same day.            X-Ray Chest 1 View (Final result) Result time:  03/01/17 08:34:33    Final result by Santiago Thomas III, MD (03/01/17 08:34:33)    Impression:     Improving chest x-ray as above.  No new abnormalities suggested.      Electronically signed by: SANTIAGO THOMAS MD  Date:     03/01/17  Time:    08:34     Narrative:    One view chest x-ray.    Clinical indication: Shortness of breath respiratory failure.    Compared to February 28.    Heart size is unchanged with ET tube and NG tube again noted.  Lungs show improvement with better aeration bilaterally and significant decrease in the  bilateral atelectatic changes and or infiltrates.  No new abnormalities.            CT Chest Without Contrast (Final result) Result time:  02/28/17 13:17:31    Final result by Osmel Presley MD (02/28/17 13:17:31)    Impression:        1.  Marked dependent atelectasis and/or aspiration in the bilateral lower lobes and posterior aspect of the right upper lobe.  Neural tube.  The NG tube could be advanced further for more optimal positioning as detailed above.    All CT scans at this facility use dose modulation, iterative reconstruction and/or weight based dosing when appropriate to reduce radiation dose to as low as reasonably achievable.      Electronically signed by: OSMEL PRESLEY MD  Date:     02/28/17  Time:    13:17     Narrative:    Exam: CT chest without intravenous contrast.    Clinical History:  Persistent hypoxia with respiratory failure.      Comparison: CT angiogram of the chest, 02/24/2017.    Technique: Axial CT images performed through the chest  after  the administration of 75 cc of intravenous contrast.     Findings:    There is endotracheal tube in good position.  There is an NG tube with its tip in the stomach just past the gastroesophageal junction; it could be advanced for more optimal positioning. No thoracic adenopathy.  The heart size is normal.  No pericardial effusion.  No dilatation of the thoracic aorta.    There is marked dependent atelectasis and/or aspiration in the bilateral lower lobes and the dependent posterior aspect of the right upper lobe. No pleural effusion or pneumothorax.     The upper abdominal visualized organs are within normal limits.       The bones are intact.            X-Ray Chest 1 View (Final result) Result time:  02/28/17 08:18:23    Final result by Darby Rocha MD (02/28/17 08:18:23)    Impression:     Mixed changes as above.            Electronically signed by: DARBY ROCHA MD  Date:     02/28/17  Time:    08:18     Narrative:    Exam: XR CHEST 1 VIEW    Clinical  History: Shortness of breath.  Respiratory failure and pneumonia, subsequent evaluation    Findings:     Improved aeration in the left lower lobe.  Subsegmental atelectasis in the right lower lobe, infrahilar location has increased.  Stable position of the supporting tubes. No pneumothorax.            X-Ray Chest 1 View (Final result) Result time:  02/27/17 09:11:01    Final result by Jaswinder Feliciano III, MD (02/27/17 09:11:01)    Impression:           Stable chest, as above.  No new abnormality seen.      Electronically signed by: JASWINDER FELICIANO MD  Date:     02/27/17  Time:    09:11     Narrative:    XR CHEST 1 VIEW    Clinical Indication: Pneumonia.    Comparison: 02/26/2017    Findings: Endotracheal and nasogastric tubes project in expected position.  Shallow inspiration is again noted with stable patchy opacities in the bilateral medial lung bases better evaluated on prior chest CT.  Opacity projecting over the lingula appears to be related to overlying chest wall catheter and summation artifact with overlying ribs and vasculature.  No new acute pulmonary infiltrates identified.  No pneumothorax or other new pulmonary disease is identified.  Heart size is upper limits of normal.            X-Ray Chest 1 View (Final result) Result time:  02/26/17 07:40:11    Final result by Adrián Presley MD (02/26/17 07:40:11)    Impression:      There has been slight interval improvement at the lung bases.      Electronically signed by: ADRIÁN PRESLEY MD  Date:     02/26/17  Time:    07:40     Narrative:    EXAM: XR CHEST 1 VIEW    CLINICAL HISTORY: Subsequent encounter for bilateral lower lobe pneumonia.    COMPARISON STUDIES: February 25, 2017    FINDINGS:  Endotracheal tube and nasogastric tubes remain.  Shallow inspiration accentuating the cardiac silhouette.  Improved aeration medial right lung base and at the left lung base as well with small amounts of residual bibasilar atelectasis and/or infiltrate.            X-Ray Chest  1 View (Final result) Result time:  02/25/17 08:19:44    Final result by Giorgi Vasquez MD (02/25/17 08:19:44)    Impression:     Basilar infiltrative changes      Electronically signed by: GIORGI VASQUEZ MD  Date:     02/25/17  Time:    08:19     Narrative:    Portable chest    Clinical indication: Respiratory failure    Findings: The heart is normal in size.  There are patchy infiltrative changes at the lung bases.  Upper lungs are clear.  NG tube in good position.            CTA Chest Non Coronary (Final result) Result time:  02/24/17 15:07:14    Final result by Santiago Thomas III, MD (02/24/17 15:07:14)    Impression:        1. Bilateral basilar infiltrates/pneumonia with atelectasis and partial consolidation.    2. Limited study due to technical factors as noted above. No gross filling defects suggested within the central/main branches of the pulmonary arterial system.    3. Otherwise as above.      Electronically signed by: SANTIAGO THOMAS MD  Date:     02/24/17  Time:    15:07     Narrative:    CT angiogram of the chest.    Clinical indication: Hypoxemia.    No prior studies for comparison.    Standard and MIPS/3-D images submitted.    Multiplanar imaging through the chest reveals a moderate volume of mediastinal fat. No suspicious mass or bulky adenopathy. Heart size is upper limits of normal. No gross hilar mass. No axillary adenopathy.    There is opacification of the superior vena cava and right atrium. Faint opacification of the aorta and pulmonary arterial system. The opacification of the pulmonary arterial system is limited which was reportedly due to equipment/technical factors.    The patient had to be taken back to the unit prior to repeating the scan.    The aorta shows no evidence of aneurysm formation. No dissection. Pulmonary arterial system is faintly opacified centrally. There are no filling defects within the major branches. The more peripheral pulmonary arterial segments are  very poorly opacified.    The lung fields demonstrate bilateral basilar infiltrates and atelectatic changes with partial consolidation.    Within the upper portion of the abdomen, I suspect fatty change of the liver. Liver is incompletely seen but appears mildly prominent. There is an NG tube noted. Bony windows show no acute abnormality.            X-Ray Chest 1 View (Final result) Result time:  02/24/17 09:23:00    Final result by Santiago Lee III, MD (02/24/17 09:23:00)    Impression:     As above. Mild residual atelectasis versus infiltrate suggested medially at the right base. Near total clearing of the left lung.      Electronically signed by: SANTIAGO LEE MD  Date:     02/24/17  Time:    09:22     Narrative:    Chest x-ray, single view.    Clinical indication: Shortness of breath.    Compared to February 23.    ET tube and NG tube again noted in position with no change in heart size. Mild residual right medial basilar atelectatic change noted. Near total clearing of the left lung. No new abnormalities.            X-Ray Chest AP Portable (Final result) Result time:  02/23/17 07:44:01    Final result by Santiago Lee III, MD (02/23/17 07:44:01)    Impression:        1. Interval placement of an NG tube. This extends at least as far as the upper abdomen.    2. Improving left lung with decreasing infiltrate and/or atelectatic change.      Electronically signed by: SANTIAGO LEE MD  Date:     02/23/17  Time:    07:44     Narrative:    One view chest x-ray.    Clinical indication: Pneumonia    Heart size is unchanged with ET tube again noted. Interval placement of an NG tube which can be seen at least as far as the of this image. Decreasing left perihilar and basilar infiltrate and effusion although there is still mild residual. Mild right medial basilar atelectatic change suggested.            X-Ray Chest 1 View (Final result) Result time:  02/22/17 09:57:42    Final result by PATRICIA Mandel  Pricila Almaguer MD (02/22/17 09:57:42)    Impression:        1.  An endotracheal tube is in place.  The tip is located 38 mm superior to the estefany.   2.  The borders of the heart are not well seen.  There is a moderate amount of alveolar consolidation in the left perihilar region.  This may be secondary to atelectasis.  However, an infectious process cannot be excluded.  3.  There is opacification of the left costophrenic angle.  This is characteristic of a tiny left pleural effusion.      Electronically signed by: PATRICIA SUTHERLAND MD  Date:     02/22/17  Time:    09:57     Narrative:    One-view chest x-ray    Clinical History: Encounter for other preprocedural examination    Finding: An endotracheal tube is in place.  The tip is located 38 mm superior to the estefany.  The borders of the heart are not well seen.  There is a moderate amount of alveolar consolidation in the left perihilar region.  There is opacification of the left costophrenic angle.  The right costophrenic angle is sharp.  There is no pneumothorax.            ASSESSMENT/PLAN:     Blisters, bilateral feet, stable    Plan:   Ordered bilateral foot x-rays.   Counseled patient on preventing pressure/friction of plantar feet by offloading feet on pillows and refraining from pressing feet against bed.   Nursing orders written paint blisters with Betadine twice daily.   Podiatry consult appreciated.

## 2017-03-07 PROCEDURE — 25000003 PHARM REV CODE 250: Performed by: NURSE PRACTITIONER

## 2017-03-07 PROCEDURE — 94761 N-INVAS EAR/PLS OXIMETRY MLT: CPT

## 2017-03-07 PROCEDURE — 97116 GAIT TRAINING THERAPY: CPT

## 2017-03-07 PROCEDURE — 97110 THERAPEUTIC EXERCISES: CPT

## 2017-03-07 PROCEDURE — 21400001 HC TELEMETRY ROOM

## 2017-03-07 PROCEDURE — 97530 THERAPEUTIC ACTIVITIES: CPT

## 2017-03-07 PROCEDURE — 94799 UNLISTED PULMONARY SVC/PX: CPT

## 2017-03-07 PROCEDURE — 25000003 PHARM REV CODE 250: Performed by: INTERNAL MEDICINE

## 2017-03-07 RX ADMIN — METOPROLOL TARTRATE 25 MG: 25 TABLET ORAL at 09:03

## 2017-03-07 RX ADMIN — FAMOTIDINE 20 MG: 20 TABLET ORAL at 09:03

## 2017-03-07 RX ADMIN — AMLODIPINE BESYLATE 5 MG: 5 TABLET ORAL at 09:03

## 2017-03-07 RX ADMIN — ACETAMINOPHEN 650 MG: 160 SOLUTION ORAL at 04:03

## 2017-03-07 RX ADMIN — POLYETHYLENE GLYCOL 3350 17 G: 17 POWDER, FOR SOLUTION ORAL at 09:03

## 2017-03-07 NOTE — ASSESSMENT & PLAN NOTE
Associated with staring Bactrim and presumed allergic response.  Intubated for airway protection.  Post-extubation hemodynamically stable without breathing difficulty.

## 2017-03-07 NOTE — ASSESSMENT & PLAN NOTE
Coagulase negative staphylococcus.  No clinical signs of unresolving infection.  Probable contaminant.

## 2017-03-07 NOTE — PLAN OF CARE
Problem: Patient Care Overview  Goal: Plan of Care Review  Outcome: Ongoing (interventions implemented as appropriate)  IMPROVED FUNCTIONAL MOBILITY TODAY, GAIT WITH CGA, NO AD

## 2017-03-07 NOTE — PLAN OF CARE
Problem: Patient Care Overview  Goal: Plan of Care Review  Outcome: Ongoing (interventions implemented as appropriate)  Pt has been free of falls. CXR ordered this AM and showed nothing. IS Q1h while awake. Scheduled oral meds administered per order.Walker at bedside. Pt ambulating in room this am up making his bed up w/no assist. Plan of care reviewed. Patient/wife verbalize understanding. No c/o pain this shift. Pt moving/turning independently. No tele monitor ordered, as well as no IV. HR 80's-100's this shift.Bed low, side rails up x 2, wheels locked, call light in reach. Air mattress in use.Patient instructed to call for assistance. Will continue to monitor.

## 2017-03-07 NOTE — CONSULTS
Serous fluid filled blister noted to plantar forefoot surface of left foot measuring 1.5cm x 3cm. Fluid drained using 10ml syringe and 18g needle, painted with cavilon barrier, covered with mepilex border lite dressing. DTI noted to plantar forefoot surface of right foot measuring 1.5cm x 2cm with maroon/purple intact skin discoloration - area cleansed with easi cleanse bath wipes, moisturizer applied. Dried scab wound noted to upper lip measuring 0.75cm x 1cm - applied lip moisturizer/lubricant to affected area. Received report of stage 2 pressure injury to sacral area. No pressure injury seen to sacrum or buttocks at time of assessment. See below for wound care nurse recommendations and wound photographs.    Left Foot Wound Care :  1. Maintain use of mepilex border dressing over affected area  2. Relieve pressure from area at all times    Right Foot Wound Care :  1. Keep area clean/apply moisturizer  2. Relieve pressure from area at all times  3. Leave open to air    Upper Lip Wound Care :  1. Apply lip moisturizer/lubricant BID

## 2017-03-07 NOTE — NURSING
Dr Bañuelos notified of pt temp of 101.8 current and admin of prn tylenol x2 this shift. Will cont to monitor for poss new orders.

## 2017-03-07 NOTE — PROGRESS NOTES
Pt wanted to take a shower. Pt got out of shower and requested some Hibiclens and powder to be put on his bottom. Looked at bottom/back/abdomen/chest and pt has blister/pimples. He states they do not hurt. Some look like they have a white head to them. I gave pt Hibiclens to apply next time in shower. He states it makes them feel better.

## 2017-03-07 NOTE — PROGRESS NOTES
[facility] Hospital Medicine  Progress Note    Patient Name: Matty Nunn  MRN: [id number]  Patient Class: IP- Inpatient   Admission Date: 2/22/2017  Length of Stay: 12 days  Attending Physician: Victorino Yu MD  Primary Care Provider: Ruddy Noble MD        Subjective:     Principal Problem:Positive blood culture    HPI:  Patient is a 37 yo male with a hx of gout presented to [facility] on 2/21/17 with a right axillary abscess which was I&D'd in the ER.  Patient was sent home on Bactrim.  He return to the ER today for increase difficulty breathing.  He was intubated in the ER, but self extubated himself.  He was then re intubated and sent to [facility] for further ICU care.      All history taken from records as pt has been intubated and sedated since prior to arrival.  Case d/w Dr. Mckinney ([facility] ER attending) who states all hx was taken from the pt prior to intubation.  Patient has a notable breath ceci on his left leg.      Hospital Course:       Interval History:  Weakness in right arm and unable to fully extend.  Tender sores on the soles of both feet.    Review of Systems   Constitutional: Negative for chills and fever.   HENT: Negative.  Negative for congestion and sore throat.    Eyes: Negative.  Negative for visual disturbance.   Respiratory: Negative.  Negative for cough, shortness of breath and wheezing.    Cardiovascular: Negative.  Negative for chest pain.   Gastrointestinal: Negative for abdominal pain, diarrhea, nausea and vomiting.   Endocrine: Negative.    Genitourinary: Negative.    Musculoskeletal: Negative.  Negative for myalgias and neck stiffness.   Skin: Negative.  Negative for color change and pallor.   Allergic/Immunologic: Negative.    Neurological: Positive for weakness.   Hematological: Negative.    Psychiatric/Behavioral: Negative.    All other systems reviewed and are negative.    Objective:     Vital Signs (Most Recent):  Temp: 98.3 °F (36.8 °C)  (03/06/17 1622)  Pulse: 92 (03/06/17 1622)  Resp: 18 (03/06/17 1622)  BP: 114/62 (03/06/17 1622)  SpO2: (!) 92 % (03/06/17 1622) Vital Signs (24h Range):  Temp:  [98.3 °F (36.8 °C)-100.7 °F (38.2 °C)] 98.3 °F (36.8 °C)  Pulse:  [90-95] 92  Resp:  [16-18] 18  SpO2:  [92 %-96 %] 92 %  BP: (114-130)/(54-66) 114/62     Weight: (!) 152.9 kg (337 lb 1.3 oz)  Body mass index is 44.47 kg/(m^2).    Intake/Output Summary (Last 24 hours) at 03/06/17 2139  Last data filed at 03/06/17 1900   Gross per 24 hour   Intake             3000 ml   Output                0 ml   Net             3000 ml      Physical Exam   Constitutional: He is oriented to person, place, and time. He appears well-developed and well-nourished. No distress.   HENT:   Head: Normocephalic and atraumatic.   Mouth/Throat: Oropharynx is clear and moist.   Eyes: Conjunctivae and EOM are normal. Pupils are equal, round, and reactive to light.   Neck: No JVD present. No thyromegaly present.   Cardiovascular: Normal rate, regular rhythm and normal heart sounds.  Exam reveals no gallop and no friction rub.    No murmur heard.  Pulmonary/Chest: Effort normal and breath sounds normal. No respiratory distress. He has no wheezes. He has no rales.   Abdominal: Soft. Bowel sounds are normal. He exhibits no distension. There is no tenderness. There is no rebound and no guarding.   Musculoskeletal: Normal range of motion. He exhibits no edema or tenderness.   Lymphadenopathy:     He has no cervical adenopathy.   Neurological: He is alert and oriented to person, place, and time. He has normal reflexes. He displays normal reflexes. No cranial nerve deficit.   RUE 4/5 strength   Skin: Skin is warm and dry. No rash noted. He is not diaphoretic. No erythema.   Bullae on distal plantar surface of both feet.   Psychiatric: He has a normal mood and affect. His behavior is normal. Judgment and thought content normal.       Significant Labs: CBC: No results for input(s): WBC, HGB, HCT,  PLT in the last 48 hours.  CMP: No results for input(s): NA, K, CL, CO2, GLU, BUN, CREATININE, CALCIUM, PROT, ALBUMIN, BILITOT, ALKPHOS, AST, ALT, ANIONGAP, EGFRNONAA in the last 48 hours.    Invalid input(s): ESTGFAFR[place]    Significant Imaging: I have reviewed all pertinent imaging results/findings within the past 24 hours.    Assessment/Plan:      * Positive blood culture  Coagulase negative staphylococcus.  No clinical signs of unresolving infection.  Probable contaminant.    Anaphylaxis  Associated with staring Bactrim and presumed allergic response.  Intubated for airway protection.  Post-extubation hemodynamically stable without breathing difficulty.    Allergic reaction  Avoid sulfa containing medications in the future.    Right arm weakness  Lower extremity and right arm weakness. Pursuing placement for subacute rehab back near [facility].  As soon as arrangements made he should be ready for discharge.    VTE Risk Mitigation         Ordered     enoxaparin injection 40 mg  Daily     Route:  Subcutaneous        02/22/17 2146     Medium Risk of VTE  Once      02/22/17 1802     Place sequential compression device  Until discontinued      02/22/17 1802          Victorino Yu MD  Department of Hospital Medicine   [facility]

## 2017-03-07 NOTE — PLAN OF CARE
Spoke with case management at [facility] , gave my direct contact information. [facility] is to call when auth obtained or if any additional information is needed.    @ [phone] Spoke with Marissa Dudley at [phone] [facility]. She stated that she submitted to patient's yesterday and is now still waiting to receive the authorization. Spoke with patient and his wife update given

## 2017-03-07 NOTE — PT/OT/SLP PROGRESS
Physical Therapy  Treatment    Matty Nunn   MRN: [id number]   Admitting Diagnosis: Positive blood culture    PT Received On: 17  PT Start Time: 1110     PT Stop Time: 1135    PT Total Time (min): 25 min       Billable Minutes:  Gait [id number] and Therapeutic Activity 10    Treatment Type: Treatment  PT/PTA: PT          General Precautions: Standard, fall  Orthopedic Precautions: N/A   Braces: N/A    Subjective:  Communicated with NURSE MAO prior to session.  Pain Ratin/10    Objective:   Patient found with: telemetry    Functional Mobility:  Bed Mobility:   Rolling/Turning to Left: Minimum assistance  Rolling/Turning Right: Minimum assistance  Scooting/Bridging: Minimum Assistance  Supine to Sit: Minimum Assistance    Transfers:  Sit <> Stand Assistance: Contact Guard Assistance  Sit <> Stand Assistive Device: No Assistive Device  Bed <> Chair Technique: Stand Pivot  Bed <> Chair Assistance: Contact Guard Assistance  Bed <> Chair Assistive Device: No Assistive Device    Gait:   Gait Distance: PT ' X 2 TRIALS WITHOUT AD WITH CGA, SLOW PACED GAIT, CUES TO CLEAR FEET, QUICK TO FATIGUE  Assistance 1: Contact Guard Assistance  Gait Assistive Device: No device  Gait Pattern: swing-through gait  Gait Deviation(s): decreased thomas, decreased step length, decreased toe-to-floor clearance    Stairs:  Pt ascended/descend 3 stair(s) with No Assistive Device with bilateral with Contact Guard Assistance.   PT SLOW AND UNSTEADY MORE SO WITH DESC. STEPS    Balance:   Static Sit: FAIR+  Dynamic Sit: FAIR  Static Stand: FAIR-  Dynamic stand:  FAIR-    Therapeutic Activities and Exercises:  REVIEWED BLE THEREX WITH PT TO PERFORM WHILE SEATED IN CHAIR    Patient left up in chair with all lines intact, call button in reach and NURSE notified.    Assessment:  Matty Nunn is a 36 y.o. male with a medical diagnosis of Positive blood culture   PT WILL BENEFIT FROM CONT. SKILLED P.T. TO ADDRESS  IMPAIRMENTS    Rehab identified problem list/impairments: Rehab identified problem list/impairments: weakness, impaired endurance, impaired functional mobilty, gait instability, impaired balance, decreased safety awareness, impaired coordination    Rehab potential is good.    Activity tolerance: Good    Discharge recommendations: Discharge Facility/Level Of Care Needs: rehabilitation facility     Barriers to discharge: Barriers to Discharge: None    Equipment recommendations: Equipment Needed After Discharge: walker, rolling, bath bench     GOALS:   Physical Therapy Goals        Problem: Physical Therapy Goal    Goal Priority Disciplines Outcome Goal Variances Interventions   Physical Therapy Goal     PT/OT, PT Ongoing (interventions implemented as appropriate)     Description:  LTG'S TO BE MET IN 7 DAYS (3-13-17)  1. PT WILL REQUIRE CGA FOR BED MOBILITY  2. PT WILL REQUIRE CGA FOR TF'S  3. PT WILL ' WITH RW AND CGA  4. PT WILL TOLERATE BLE THEREX X 20 REPS AROM  5. PT WILL TOLERATE F DYNAMIC SITTING BALANCE                PLAN:    Patient to be seen 5 x/week  to address the above listed problems via gait training, therapeutic activities, therapeutic exercises  Plan of Care expires: 03/10/17  Plan of Care reviewed with: patient    PT ENCOURAGED TO CALL FOR ASSISTANCE WITH ALL NEEDS DUE TO FALL RISK STATUS, PT AGREEABLE.  PT ENCOURAGED TO INCREASE TIME OOB IN CHAIR, ALL MEALS IN CHAIR OOB, PT AGREEABLE    Renae Noriega, PT  03/07/2017

## 2017-03-07 NOTE — ASSESSMENT & PLAN NOTE
Lower extremity and right arm weakness. Pursuing placement for subacute rehab back near [facility].  As soon as arrangements made he should be ready for discharge.

## 2017-03-07 NOTE — PLAN OF CARE
Problem: Patient Care Overview  Goal: Plan of Care Review  Outcome: Ongoing (interventions implemented as appropriate)  Pt with T-max of 101.8 this shift. PRN tylenol admin x2. MD notified. CXR ordered for AM. IS Q1h while awake.   Scheduled oral meds administered per order.   Pt remained free of falls this shift. Walker at bedside. Pt ambulating in room this am up making his bed up w/no assist.   Plan of care reviewed. Patient/wife verbalize understanding.   No c/o pain this shift.  Pt moving/turning independently.   No tele monitor ordered. HR 80's-100's this shift.  Bed low, side rails up x 2, wheels locked, call light in reach. Air mattress in use.  Patient instructed to call for assistance.   Will continue to monitor.

## 2017-03-07 NOTE — PT/OT/SLP PROGRESS
Occupational Therapy      Matty Nunn  MRN: [id number]    S: Pt cooperative with therapy session  O: Pt see in room standing by  Bedside chair. Pt with no c/o pain. Pt performed 1 set x 15 reps b ue rom exercise seated in bedside chair (shoulder flexion, chest press, elbow flexion/ext )  A: pt verbalized and returned demonstration of hep.  P: Continue POC  Coby Simpson, OT   [id number]  1 michelle  3/7/2017

## 2017-03-07 NOTE — SUBJECTIVE & OBJECTIVE
Interval History:  Weakness in right arm and unable to fully extend.  Tender sores on the soles of both feet.    Review of Systems   Constitutional: Negative for chills and fever.   HENT: Negative.  Negative for congestion and sore throat.    Eyes: Negative.  Negative for visual disturbance.   Respiratory: Negative.  Negative for cough, shortness of breath and wheezing.    Cardiovascular: Negative.  Negative for chest pain.   Gastrointestinal: Negative for abdominal pain, diarrhea, nausea and vomiting.   Endocrine: Negative.    Genitourinary: Negative.    Musculoskeletal: Negative.  Negative for myalgias and neck stiffness.   Skin: Negative.  Negative for color change and pallor.   Allergic/Immunologic: Negative.    Neurological: Positive for weakness.   Hematological: Negative.    Psychiatric/Behavioral: Negative.    All other systems reviewed and are negative.    Objective:     Vital Signs (Most Recent):  Temp: 98.3 °F (36.8 °C) (03/06/17 1622)  Pulse: 92 (03/06/17 1622)  Resp: 18 (03/06/17 1622)  BP: 114/62 (03/06/17 1622)  SpO2: (!) 92 % (03/06/17 1622) Vital Signs (24h Range):  Temp:  [98.3 °F (36.8 °C)-100.7 °F (38.2 °C)] 98.3 °F (36.8 °C)  Pulse:  [90-95] 92  Resp:  [16-18] 18  SpO2:  [92 %-96 %] 92 %  BP: (114-130)/(54-66) 114/62     Weight: (!) 152.9 kg (337 lb 1.3 oz)  Body mass index is 44.47 kg/(m^2).    Intake/Output Summary (Last 24 hours) at 03/06/17 2700  Last data filed at 03/06/17 1900   Gross per 24 hour   Intake             3000 ml   Output                0 ml   Net             3000 ml      Physical Exam   Constitutional: He is oriented to person, place, and time. He appears well-developed and well-nourished. No distress.   HENT:   Head: Normocephalic and atraumatic.   Mouth/Throat: Oropharynx is clear and moist.   Eyes: Conjunctivae and EOM are normal. Pupils are equal, round, and reactive to light.   Neck: No JVD present. No thyromegaly present.   Cardiovascular: Normal rate, regular rhythm and  normal heart sounds.  Exam reveals no gallop and no friction rub.    No murmur heard.  Pulmonary/Chest: Effort normal and breath sounds normal. No respiratory distress. He has no wheezes. He has no rales.   Abdominal: Soft. Bowel sounds are normal. He exhibits no distension. There is no tenderness. There is no rebound and no guarding.   Musculoskeletal: Normal range of motion. He exhibits no edema or tenderness.   Lymphadenopathy:     He has no cervical adenopathy.   Neurological: He is alert and oriented to person, place, and time. He has normal reflexes. He displays normal reflexes. No cranial nerve deficit.   RUE 4/5 strength   Skin: Skin is warm and dry. No rash noted. He is not diaphoretic. No erythema.   Bullae on distal plantar surface of both feet.   Psychiatric: He has a normal mood and affect. His behavior is normal. Judgment and thought content normal.       Significant Labs: CBC: No results for input(s): WBC, HGB, HCT, PLT in the last 48 hours.  CMP: No results for input(s): NA, K, CL, CO2, GLU, BUN, CREATININE, CALCIUM, PROT, ALBUMIN, BILITOT, ALKPHOS, AST, ALT, ANIONGAP, EGFRNONAA in the last 48 hours.    Invalid input(s): ESTGFAFR[place]    Significant Imaging: I have reviewed all pertinent imaging results/findings within the past 24 hours.

## 2017-03-08 VITALS
HEIGHT: 73 IN | TEMPERATURE: 98 F | SYSTOLIC BLOOD PRESSURE: 109 MMHG | BODY MASS INDEX: 41.75 KG/M2 | HEART RATE: 84 BPM | OXYGEN SATURATION: 94 % | RESPIRATION RATE: 18 BRPM | DIASTOLIC BLOOD PRESSURE: 68 MMHG | WEIGHT: 315 LBS

## 2017-03-08 PROBLEM — R78.81 POSITIVE BLOOD CULTURE: Status: RESOLVED | Noted: 2017-03-03 | Resolved: 2017-03-08

## 2017-03-08 PROBLEM — T78.40XA ALLERGIC REACTION: Status: RESOLVED | Noted: 2017-03-03 | Resolved: 2017-03-08

## 2017-03-08 PROBLEM — T78.2XXA ANAPHYLAXIS: Status: RESOLVED | Noted: 2017-02-22 | Resolved: 2017-03-08

## 2017-03-08 LAB — BACTERIA BLD CULT: NORMAL

## 2017-03-08 PROCEDURE — 25000003 PHARM REV CODE 250: Performed by: NURSE PRACTITIONER

## 2017-03-08 PROCEDURE — 90472 IMMUNIZATION ADMIN EACH ADD: CPT | Performed by: NURSE PRACTITIONER

## 2017-03-08 PROCEDURE — 99900035 HC TECH TIME PER 15 MIN (STAT)

## 2017-03-08 PROCEDURE — 90471 IMMUNIZATION ADMIN: CPT | Performed by: NURSE PRACTITIONER

## 2017-03-08 PROCEDURE — 3E0234Z INTRODUCTION OF SERUM, TOXOID AND VACCINE INTO MUSCLE, PERCUTANEOUS APPROACH: ICD-10-PCS | Performed by: INTERNAL MEDICINE

## 2017-03-08 PROCEDURE — 90686 IIV4 VACC NO PRSV 0.5 ML IM: CPT | Performed by: NURSE PRACTITIONER

## 2017-03-08 PROCEDURE — 63600175 PHARM REV CODE 636 W HCPCS: Performed by: NURSE PRACTITIONER

## 2017-03-08 PROCEDURE — 25000003 PHARM REV CODE 250: Performed by: INTERNAL MEDICINE

## 2017-03-08 PROCEDURE — 90670 PCV13 VACCINE IM: CPT | Performed by: NURSE PRACTITIONER

## 2017-03-08 RX ADMIN — INFLUENZA A VIRUS A/CALIFORNIA/7/2009 X-179A (H1N1) ANTIGEN (FORMALDEHYDE INACTIVATED), INFLUENZA A VIRUS A/HONG KONG/4801/2014 X-263B (H3N2) ANTIGEN (FORMALDEHYDE INACTIVATED), INFLUENZA B VIRUS B/PHUKET/3073/2013 ANTIGEN (FORMALDEHYDE INACTIVATED), AND INFLUENZA B VIRUS B/BRISBANE/60/2008 ANTIGEN (FORMALDEHYDE INACTIVATED) 0.5 ML: 15; 15; 15; 15 INJECTION, SUSPENSION INTRAMUSCULAR at 10:03

## 2017-03-08 RX ADMIN — FAMOTIDINE 20 MG: 20 TABLET ORAL at 08:03

## 2017-03-08 RX ADMIN — AMLODIPINE BESYLATE 5 MG: 5 TABLET ORAL at 08:03

## 2017-03-08 RX ADMIN — METOPROLOL TARTRATE 25 MG: 25 TABLET ORAL at 08:03

## 2017-03-08 RX ADMIN — PNEUMOCOCCAL 13-VALENT CONJUGATE VACCINE 0.5 ML: 2.2; 2.2; 2.2; 2.2; 2.2; 4.4; 2.2; 2.2; 2.2; 2.2; 2.2; 2.2; 2.2 INJECTION, SUSPENSION INTRAMUSCULAR at 10:03

## 2017-03-08 NOTE — NURSING
Discharged orders received and reviewed with family and pt. Pt instructed when to take each medication next dose. Folder given to pt to bring to [facility] [place] IV removed, telemetry removed. Pt assisted with dressing by staff. Pt transported to [facility] via w/c by staff for family to transport to [facility].

## 2017-03-08 NOTE — PLAN OF CARE
Problem: Patient Care Overview  Goal: Plan of Care Review  Outcome: Ongoing (interventions implemented as appropriate)  Pt with low-grade temp max of 99.7 oral this shift.   Scheduled oral meds administered per order.   Pt remained free of falls this shift.   Plan of care reviewed. Patient/wife verbalize understanding.   No c/o pain this shift.  Pt moving/turning/walking independently. Pt states he still has some residual weakness in BUE.    Patient not on telemetry. HR 80's/90's all night.  Insurance approval pending for transfer to rehab in [place].  Bed low, side rails up x 2, wheels locked, call light in reach.   Patient instructed to call for assistance.   Will continue to monitor.

## 2017-03-08 NOTE — PROGRESS NOTES
Spoke with Sharmila from [facility] and have given report. Pt given folder to leave.

## 2017-03-08 NOTE — SUBJECTIVE & OBJECTIVE
Interval History:  Weakness in right arm and unable to fully extend.  Tender sores on the soles of both feet and in gluteal fold.    Review of Systems   Constitutional: Negative for chills and fever.   HENT: Negative.  Negative for congestion and sore throat.    Eyes: Negative.  Negative for visual disturbance.   Respiratory: Negative.  Negative for cough, shortness of breath and wheezing.    Cardiovascular: Negative.  Negative for chest pain.   Gastrointestinal: Negative for abdominal pain, diarrhea, nausea and vomiting.   Endocrine: Negative.    Genitourinary: Negative.    Musculoskeletal: Negative.  Negative for myalgias and neck stiffness.   Skin: Negative.  Negative for color change and pallor.   Allergic/Immunologic: Negative.    Neurological: Positive for weakness.   Hematological: Negative.    Psychiatric/Behavioral: Negative.    All other systems reviewed and are negative.    Objective:     Vital Signs (Most Recent):  Temp: 99.3 °F (37.4 °C) (03/07/17 1548)  Pulse: 96 (03/07/17 1548)  Resp: 17 (03/07/17 1548)  BP: (!) 111/54 (03/07/17 1548)  SpO2: 95 % (03/07/17 1548) Vital Signs (24h Range):  Temp:  [98.5 °F (36.9 °C)-101.8 °F (38.8 °C)] 99.3 °F (37.4 °C)  Pulse:  [] 96  Resp:  [17-18] 17  SpO2:  [92 %-95 %] 95 %  BP: (109-144)/(54-90) 111/54     Weight: (!) 152.9 kg (337 lb 1.3 oz)  Body mass index is 44.47 kg/(m^2).    Intake/Output Summary (Last 24 hours) at 03/07/17 2126  Last data filed at 03/07/17 1230   Gross per 24 hour   Intake             2800 ml   Output              650 ml   Net             2150 ml      Physical Exam   Constitutional: He is oriented to person, place, and time. He appears well-developed and well-nourished. No distress.   HENT:   Head: Normocephalic and atraumatic.   Mouth/Throat: Oropharynx is clear and moist.   Eyes: Conjunctivae and EOM are normal. Pupils are equal, round, and reactive to light.   Neck: No JVD present. No thyromegaly present.   Cardiovascular: Normal  rate, regular rhythm and normal heart sounds.  Exam reveals no gallop and no friction rub.    No murmur heard.  Pulmonary/Chest: Effort normal and breath sounds normal. No respiratory distress. He has no wheezes. He has no rales.   Abdominal: Soft. Bowel sounds are normal. He exhibits no distension. There is no tenderness. There is no rebound and no guarding.   Musculoskeletal: Normal range of motion. He exhibits no edema or tenderness.   Lymphadenopathy:     He has no cervical adenopathy.   Neurological: He is alert and oriented to person, place, and time. He has normal reflexes. He displays normal reflexes. No cranial nerve deficit.   RUE 4/5 strength   Skin: Skin is warm and dry. No rash noted. He is not diaphoretic. No erythema.   Bullae on distal plantar surface of both feet.   Psychiatric: He has a normal mood and affect. His behavior is normal. Judgment and thought content normal.       Significant Labs: CBC: No results for input(s): WBC, HGB, HCT, PLT in the last 48 hours.  CMP: No results for input(s): NA, K, CL, CO2, GLU, BUN, CREATININE, CALCIUM, PROT, ALBUMIN, BILITOT, ALKPHOS, AST, ALT, ANIONGAP, EGFRNONAA in the last 48 hours.    Invalid input(s): ESTGFAFRICA    Significant Imaging: I have reviewed all pertinent imaging results/findings within the past 24 hours.

## 2017-03-08 NOTE — PROGRESS NOTES
[facility] Hospital Medicine  Progress Note    Patient Name: Matty Nunn  MRN: [id number]  Patient Class: IP- Inpatient   Admission Date: 2/22/2017  Length of Stay: 13 days  Attending Physician: Victorino Yu MD  Primary Care Provider: Ruddy Noble MD        Subjective:     Principal Problem:Anaphylaxis    HPI:  Patient is a 37 yo male with a hx of gout presented to [facility] on 2/21/17 with a right axillary abscess which was I&D'd in the ER.  Patient was sent home on Bactrim.  He return to the ER today for increase difficulty breathing.  He was intubated in the ER, but self extubated himself.  He was then re intubated and sent to [facility] for further ICU care.      All history taken from records as pt has been intubated and sedated since prior to arrival.  Case d/w Dr. Mckinney ([facility] ER attending) who states all hx was taken from the pt prior to intubation.  Patient has a notable breath ceci on his left leg.      Hospital Course:       Interval History:  Weakness in right arm and unable to fully extend.  Tender sores on the soles of both feet and in gluteal fold.    Review of Systems   Constitutional: Negative for chills and fever.   HENT: Negative.  Negative for congestion and sore throat.    Eyes: Negative.  Negative for visual disturbance.   Respiratory: Negative.  Negative for cough, shortness of breath and wheezing.    Cardiovascular: Negative.  Negative for chest pain.   Gastrointestinal: Negative for abdominal pain, diarrhea, nausea and vomiting.   Endocrine: Negative.    Genitourinary: Negative.    Musculoskeletal: Negative.  Negative for myalgias and neck stiffness.   Skin: Negative.  Negative for color change and pallor.   Allergic/Immunologic: Negative.    Neurological: Positive for weakness.   Hematological: Negative.    Psychiatric/Behavioral: Negative.    All other systems reviewed and are negative.    Objective:     Vital Signs (Most Recent):  Temp: 99.3 °F  (37.4 °C) (03/07/17 1548)  Pulse: 96 (03/07/17 1548)  Resp: 17 (03/07/17 1548)  BP: (!) 111/54 (03/07/17 1548)  SpO2: 95 % (03/07/17 1548) Vital Signs (24h Range):  Temp:  [98.5 °F (36.9 °C)-101.8 °F (38.8 °C)] 99.3 °F (37.4 °C)  Pulse:  [] 96  Resp:  [17-18] 17  SpO2:  [92 %-95 %] 95 %  BP: (109-144)/(54-90) 111/54     Weight: (!) 152.9 kg (337 lb 1.3 oz)  Body mass index is 44.47 kg/(m^2).    Intake/Output Summary (Last 24 hours) at 03/07/17 2126  Last data filed at 03/07/17 1230   Gross per 24 hour   Intake             2800 ml   Output              650 ml   Net             2150 ml      Physical Exam   Constitutional: He is oriented to person, place, and time. He appears well-developed and well-nourished. No distress.   HENT:   Head: Normocephalic and atraumatic.   Mouth/Throat: Oropharynx is clear and moist.   Eyes: Conjunctivae and EOM are normal. Pupils are equal, round, and reactive to light.   Neck: No JVD present. No thyromegaly present.   Cardiovascular: Normal rate, regular rhythm and normal heart sounds.  Exam reveals no gallop and no friction rub.    No murmur heard.  Pulmonary/Chest: Effort normal and breath sounds normal. No respiratory distress. He has no wheezes. He has no rales.   Abdominal: Soft. Bowel sounds are normal. He exhibits no distension. There is no tenderness. There is no rebound and no guarding.   Musculoskeletal: Normal range of motion. He exhibits no edema or tenderness.   Lymphadenopathy:     He has no cervical adenopathy.   Neurological: He is alert and oriented to person, place, and time. He has normal reflexes. He displays normal reflexes. No cranial nerve deficit.   RUE 4/5 strength   Skin: Skin is warm and dry. No rash noted. He is not diaphoretic. No erythema.   Bullae on distal plantar surface of both feet.   Psychiatric: He has a normal mood and affect. His behavior is normal. Judgment and thought content normal.       Significant Labs: CBC: No results for input(s):  WBC, HGB, HCT, PLT in the last 48 hours.  CMP: No results for input(s): NA, K, CL, CO2, GLU, BUN, CREATININE, CALCIUM, PROT, ALBUMIN, BILITOT, ALKPHOS, AST, ALT, ANIONGAP, EGFRNONAA in the last 48 hours.    Invalid input(s): ESTGFAFRICA    Significant Imaging: I have reviewed all pertinent imaging results/findings within the past 24 hours.    Assessment/Plan:      * Anaphylaxis  Associated with staring Bactrim and presumed allergic response.  Intubated for airway protection.  Post-extubation hemodynamically stable without breathing difficulty.    Allergic reaction  Avoid sulfa containing medications in the future.    Positive blood culture  Coagulase negative staphylococcus.  No clinical signs of unresolving infection.  Probable contaminant.    Right arm weakness  Lower extremity and right arm weakness. Pursuing placement for subacute rehab back near [facility].  As soon as arrangements made he should be ready for discharge.    VTE Risk Mitigation         Ordered     enoxaparin injection 40 mg  Daily     Route:  Subcutaneous        02/22/17 2141     Medium Risk of VTE  Once      02/22/17 1802     Place sequential compression device  Until discontinued      02/22/17 1802          Victorino Yu MD  Department of Hospital Medicine   [facility]

## 2017-03-08 NOTE — ASSESSMENT & PLAN NOTE
Lower extremity and right arm weakness. Pursuing placement for subacute rehab back near [facility].  As soon as arrangements made he should be ready for discharge.

## 2017-03-08 NOTE — PLAN OF CARE
Problem: Patient Care Overview  Goal: Plan of Care Review  Outcome: Ongoing (interventions implemented as appropriate)  Pt on room air tolerating well. No distress noted at this time.

## 2017-03-08 NOTE — PLAN OF CARE
Call received from Sussy Dudley CM at [facility], they have received insurance authorization. Once she received discharge orders, avs, and current mar  And reviews the paperwork she will call with a room assignment and # for report. Update to Dr Yu with request for discharge orders if appropriate. Update also to primary nurse Emily and to patient's wife, Kasandra.      @[id number] received from Marissa Dudley CM at [facility].Patient will be admitted to Dr Oniel Schultz. The address is [address] [place] [address]. # [phone],  # [phone].     @ [phone] Discharge packet given to Kimberlee colindres staff , primary nurse aware. Information also given to patient and his wife , Kasandra.

## 2017-03-08 NOTE — PLAN OF CARE
03/08/17 1700   Final Note   Assessment Type Final Discharge Note   Discharge Disposition Rehab  ([facility], [place] )

## 2017-03-16 NOTE — DISCHARGE SUMMARY
[facility] Hospital Medicine  Discharge Summary      Patient Name: Matty Nunn  MRN: [id number]  Admission Date: 2/22/2017  Hospital Length of Stay: 14 days  Discharge Date and Time: 3/8/2017  3:13 PM  Attending Physician: No att. providers found   Discharging Provider: Victorino Yu MD  Primary Care Provider: Ruddy Noble MD      HPI:   Patient is a 35 yo male with a hx of gout presented to [facility] on 2/21/17 with a right axillary abscess which was I&D'd in the ER.  Patient was sent home on Bactrim.  He return to the ER today for increase difficulty breathing.  He was intubated in the ER, but self extubated himself.  He was then re intubated and sent to [facility] for further ICU care.      All history taken from records as pt has been intubated and sedated since prior to arrival.  Case d/w Dr. Mckinney ([facility] ER attending) who states all hx was taken from the pt prior to intubation.  Patient has a notable breath ceci on his left leg.      * No surgery found *      Indwelling Lines/Drains at time of discharge:   Lines/Drains/Airways     Pressure Ulcer                 Pressure Ulcer 03/03/17 1537 sacral spine Stage I 12 days              Hospital Course:   Intubated for airway protection in [facility] then re-intubated after he extubated himself.  He remained stable and was transferred to the ICU in [facility].  He remained hemodynamically stable throughout but had some difficulty with oxygenation and weaning sedation.  To maintain adequate saturations he required up to 100% oxygen and PEEP of 15.  This prompted additional workup and serial CT scans showed a significant amount of dependent atelectasis and possible pneumonia but nothing more.  We added chest physiotherapy to nebulizer treatments.  One blood culture was positive for coagulase negative Staphylococci probably a contaminant.  Respiratory cultures only showed normal roxana.  He was initially started on Clindamycin and  Zosyn then narrowed to Zosyn only.  He was successfully extubated on 01 March with some lingering shortness of breath that steadily resolved with some cough but no additional respiratory distress.  He was hypertensive after being weaned from sedation and extubated.  Hypertension initially controlled with a Cardene drip then started on Amlodipine and Metoprolol.  He developed some weakness in right arm and unable to fully extend and some superficial sores on the soles of both feet and in gluteal fold.  He remained hemodynamically stable, afebrile, and without respiratory difficulty but deconditioned with limited leg and right upper extremity strength.  He was evaluated by physical and occupational therapy who recommended subacute rehabilitation.  He was accepted at a facility near home in [place].  I have seen and examined Mr. Nunn on the day of discharge and deemed him safe for transfer to rehabilitation.     Consults:   Consults         Status Ordering Provider     Inpatient consult to Podiatry  Once     Provider:  Gail Paz DPM    Completed PIO SERRA     Inpatient consult to Pulmonology  Once     Provider:  (Not yet assigned)    Completed DARRIN KUHN     IP consult to dietary  Once     Provider:  (Not yet assigned)    Completed COLLIN POWERS          Significant Diagnostic Studies: Labs: CMP No results for input(s): NA, K, CL, CO2, GLU, BUN, CREATININE, CALCIUM, PROT, ALBUMIN, BILITOT, ALKPHOS, AST, ALT, ANIONGAP, ESTGFRAFRICA, EGFRNONAA in the last 48 hours. and CBC No results for input(s): WBC, HGB, HCT, PLT in the last 48 hours.    Pending Diagnostic Studies:     None        Final Active Diagnoses:    Diagnosis Date Noted POA    Right arm weakness [R29.898] 03/06/2017 Yes      Problems Resolved During this Admission:    Diagnosis Date Noted Date Resolved POA    PRINCIPAL PROBLEM:  Anaphylaxis [T78.2XXA] 02/22/2017 03/08/2017 Yes    Allergic reaction [T78.40XA] 03/03/2017 03/08/2017 Yes  "   Positive blood culture [R78.81] 03/03/2017 03/08/2017 No    Acute respiratory failure with hypoxia [J96.01] 02/24/2017 03/02/2017 Yes    Pneumonia due to Gram-negative bacteria [J15.6] 02/24/2017 03/03/2017 Yes    Hyperglycemia, drug-induced [R73.9] 02/24/2017 03/02/2017 No    Allergic reaction [T78.40XA] 02/22/2017 03/01/2017 Yes    Abscess of axilla, right [L02.411] 02/22/2017 03/01/2017 Yes      No new Assessment & Plan notes have been filed under this hospital service since the last note was generated.  Service: Hospital Medicine      Discharged Condition: fair    Disposition: Rehab Facility    Follow Up:  Follow-up Information     Follow up with Ruddy Noble MD In 3 weeks.    Specialty:  Family Medicine    Why:  Hospital follow up.    Contact information:    [address] [facility] [address]  [phone]          Follow up with [facility].    Why:  Rehab    Contact information:    [phone]        Patient Instructions:     WALKER FOR HOME USE   Order Specific Question Answer Comments   Type of Walker: Heavy duty Bariatric    With wheels? Yes    Height: 6' 1" (1.854 m)    Weight: 152.9 kg (337 lb 1.3 oz)    Length of need (1-99 months): 12    Does patient have medical equipment at home? none    Please check all that apply: Patient's condition impairs ambulation.    Please check all that apply: Patient is unable to safely ambulate without equipment.    Please check all that apply: Patient needs help to get in and out of chair.    Please check all that apply: Walker will be used for gait training.      Referral to OutPatient  Physical therapy   Referral Priority: Routine Referral Type: Physical Medicine   Referral Reason: Specialty Services Required    Requested Specialty: Physical Therapy    Number of Visits Requested: 1      Referral to Outpatient Occupational therapy   Referral Priority: Routine Referral Type: Occupational Therapy "   Referral Reason: Specialty Services Required    Requested Specialty: Occupational Therapy    Number of Visits Requested: 1      Diet general       Medications:  Reconciled Home Medications:   Discharge Medication List as of 3/8/2017 11:07 AM      STOP taking these medications       sulfamethoxazole-trimethoprim 800-160mg (BACTRIM DS) 800-160 mg Tab Comments:   Reason for Stopping:             Time spent on the discharge of patient: 30 minutes    Victorino Yu MD  Department of Hospital Medicine  [facility]

## 2018-01-16 ENCOUNTER — HOSPITAL ENCOUNTER (OUTPATIENT)
Dept: RADIOLOGY | Facility: HOSPITAL | Age: 38
Discharge: HOME | End: 2018-01-16
Attending: FAMILY MEDICINE
Payer: COMMERCIAL

## 2018-01-16 DIAGNOSIS — R05.9 COUGH: ICD-10-CM

## 2018-01-16 DIAGNOSIS — J45.991 COUGH VARIANT ASTHMA: ICD-10-CM

## 2018-01-16 DIAGNOSIS — J45.991 COUGH VARIANT ASTHMA: Primary | ICD-10-CM

## 2018-01-16 PROCEDURE — 71046 X-RAY EXAM CHEST 2 VIEWS: CPT | Mod: TC,PO

## 2018-01-16 PROCEDURE — 71046 X-RAY EXAM CHEST 2 VIEWS: CPT | Mod: 26,,, | Performed by: RADIOLOGY

## 2021-01-10 NOTE — PT/OT/SLP PROGRESS
Physical Therapy  Treatment    Matty Nunn   MRN: [id number]   Admitting Diagnosis: Pneumonia due to Gram-negative bacteria    PT Received On: 17  PT Start Time: 0810     PT Stop Time: 0840    PT Total Time (min): 30 min       Billable Minutes:  Therapeutic Activity 15 and Therapeutic Exercise 15    Treatment Type: Treatment     General Precautions: Standard, fall, respiratory  Orthopedic Precautions: N/A   Braces: N/A    Subjective:  Communicated with NURSE POORNIMA prior to session.  Pain Ratin/10    Objective:   Patient found with: telemetry, oxygen, peripheral IV, crisostomo catheter, pulse ox (continuous), blood pressure cuff    Functional Mobility:  Bed Mobility:   Rolling/Turning to Left: Maximum assistance  Rolling/Turning Right: Maximum assistance  Scooting/Bridging: Maximum Assistance  Supine to Sit: Maximum Assistance, With side rail (CUES FOR  STRENGTH TO RAIL)    Transfers:  Sit <> Stand Assistance: Maximum Assistance (3 TRIALS, MINIMAL DIZZINESS WITH STANCE, SEATED REST BREAKS IN BETWEEN)  Sit <> Stand Assistive Device: No Assistive Device  Bed <> Chair Technique: Stand Pivot  Bed <> Chair Assistance: Maximum Assistance  Bed <> Chair Assistive Device: No Assistive Device    Gait:   Gait Distance: PT TOOK SEVERAL SMALL SHUFFLING STEPS TO TF BED TO CHAIR WITH HHA/MAXA  Assistance 1: Maximum assistance  Gait Assistive Device: No device, Hand held assist  Gait Pattern: swing-to gait  Gait Deviation(s): decreased thomas, decreased step length, decreased toe-to-floor clearance, decreased weight-shifting ability    Balance:   Static Sit: FAIR-  Dynamic Sit: FAIR-  Static Stand: POOR  Dynamic stand:  POOR    Therapeutic Activities and Exercises:  PT EDUCATED IN AND PERFORMED BUE AND BLE THEREX X 15 REPS EACH, AROM FOR BLE, AAROM FOR BUE.  PT ABLE TO WASH FACE WITH MODA, CONSTANT CUES FOR UPRIGHT HEAD POSTURE DUE TO FLEXED FWD.  PT REQUIRES EXTRA TIME WITH ALL FUNCTIONAL MOBILITY    Patient  left up in chair with all lines intact, call button in reach and NURSE notified.    Assessment:  Matty Nunn is a 36 y.o. male with a medical diagnosis of Pneumonia due to Gram-negative bacteria PT WILL BENEFIT FROM CONT. SKILLED P.T. TO ADDRESS IMPAIRMENTS    Rehab identified problem list/impairments: Rehab identified problem list/impairments: weakness, impaired endurance, impaired functional mobilty, decreased safety awareness, impaired coordination, gait instability, impaired balance, decreased upper extremity function, decreased lower extremity function, edema    Rehab potential is fair.    Activity tolerance: Good    Discharge recommendations: Discharge Facility/Level Of Care Needs: rehabilitation facility     Barriers to discharge: Barriers to Discharge: Decreased caregiver support    Equipment recommendations: Equipment Needed After Discharge:  (TO BE ASSESS WITH PROGRESS)     GOALS:   Physical Therapy Goals        Problem: Physical Therapy Goal    Goal Priority Disciplines Outcome Goal Variances Interventions   Physical Therapy Goal     PT/OT, PT      Description:  LTG'S TO BE MET IN 7 DAYS (3-10-17)  1. PT WILL REQUIRE MODA FOR BED MOBILITY  2. PT WILL REQUIRE MODA FOR TF'S  3. PT WILL AMB 50' WITH RW AND MODA  4. PT WILL TOLERATE BLE THEREX X 20 REPS AAROM/AROM  5. PT WILL TOLERATE P+ DYNAMIC SITTING BALANCE             PLAN:    Patient to be seen  (A MINIMUM OF 5 OF 7 DAYS A WEEK AS TOLERATED)  to address the above listed problems via  (CONT PER POC WITH UPDATED GOALS)  Plan of Care expires: 03/10/17  Plan of Care reviewed with: patient    PT ENCOURAGED TO CALL FOR ASSISTANCE WITH ALL NEEDS DUE TO FALL RISK STATUS, PT AGREEABLE.  PT ENCOURAGED TO INCREASE TIME OOB IN CHAIR, ALL MEALS IN CHAIR OOB, PT AGREEABLE    Renae Noriega, PT  03/03/2017     sterile technique, indwelling urinary device inserted

## 2023-04-25 NOTE — PHYSICIAN QUERY
PT Name: Matty Nunn  MR #: [id number]     Physician Query Form - Documentation Clarification    Reviewer  Ext [phone]  Barbara Crews RN  CCDS    This form is a permanent document in the medical record.     Query Date: February 23, 2017  By submitting this query, we are merely seeking further clarification of documentation to reflect the severity of illness of your patient. Please utilize your independent clinical judgment when addressing the question(s) below.    (The Medical record reflects the following:)      Supporting Clinical Findings Location in Medical Record   Pt appears distressed , walking but speaking with muffled voice  Positive for SOB    Pt is maintaining his airway okay at this time. He does have a gag reflex but has a muffled voice and difficulty swallowing       Per ED MD     O2 sat 87%-95%  RR-16-30       VS for first 24 hours                                                                            Doctor, Please specify diagnosis or diagnoses associated with above clinical findings.    Physician Use Only    Please clarify the reason the patient was intubated.  Thank you.      [   ]  Acute respiratory failure    [   ] Airway protection only    [   ] Other ____________________     Please ask Dr. Mckinney as I did not see this patient prior to intubation.  He was the assessing MD that spoke to the pt.                                                                                                                         [  ] Unable to determine             error Wartpeel Counseling:  I discussed with the patient the risks of Wartpeel including but not limited to erythema, scaling, itching, weeping, crusting, and pain.